# Patient Record
Sex: MALE | Race: WHITE | NOT HISPANIC OR LATINO | Employment: OTHER | URBAN - METROPOLITAN AREA
[De-identification: names, ages, dates, MRNs, and addresses within clinical notes are randomized per-mention and may not be internally consistent; named-entity substitution may affect disease eponyms.]

---

## 2017-12-18 ENCOUNTER — ANESTHESIA (OUTPATIENT)
Dept: SURGERY | Facility: HOSPITAL | Age: 77
DRG: 329 | End: 2017-12-18

## 2017-12-18 ENCOUNTER — HOSPITAL ENCOUNTER (INPATIENT)
Facility: HOSPITAL | Age: 77
LOS: 6 days | Discharge: HOME | DRG: 329 | End: 2017-12-24
Attending: EMERGENCY MEDICINE | Admitting: INTERNAL MEDICINE

## 2017-12-18 ENCOUNTER — SURGERY (OUTPATIENT)
Age: 77
End: 2017-12-18

## 2017-12-18 ENCOUNTER — ANESTHESIA EVENT (OUTPATIENT)
Dept: SURGERY | Facility: HOSPITAL | Age: 77
DRG: 329 | End: 2017-12-18

## 2017-12-18 DIAGNOSIS — K76.9 LIVER LESION: ICD-10-CM

## 2017-12-18 DIAGNOSIS — Z93.3 S/P COLOSTOMY: ICD-10-CM

## 2017-12-18 DIAGNOSIS — K56.609 BOWEL OBSTRUCTION: ICD-10-CM

## 2017-12-18 DIAGNOSIS — E87.6 HYPOKALEMIA: ICD-10-CM

## 2017-12-18 DIAGNOSIS — K56.600 PARTIAL SMALL BOWEL OBSTRUCTION: ICD-10-CM

## 2017-12-18 DIAGNOSIS — R73.9 HYPERGLYCEMIA, UNSPECIFIED: ICD-10-CM

## 2017-12-18 DIAGNOSIS — I48.91 ATRIAL FIBRILLATION WITH RAPID VENTRICULAR RESPONSE: ICD-10-CM

## 2017-12-18 DIAGNOSIS — R11.0 NAUSEA: ICD-10-CM

## 2017-12-18 DIAGNOSIS — K55.9 SMALL BOWEL ISCHEMIA: ICD-10-CM

## 2017-12-18 DIAGNOSIS — E87.1 HYPONATREMIA: ICD-10-CM

## 2017-12-18 DIAGNOSIS — R10.9 ABDOMINAL PAIN, UNSPECIFIED ABDOMINAL LOCATION: Primary | ICD-10-CM

## 2017-12-18 DIAGNOSIS — R16.0 LIVER MASSES: ICD-10-CM

## 2017-12-18 DIAGNOSIS — R57.9 SHOCK: ICD-10-CM

## 2017-12-18 DIAGNOSIS — C18.6 MALIGNANT NEOPLASM OF DESCENDING COLON: ICD-10-CM

## 2017-12-18 DIAGNOSIS — J96.01 ACUTE HYPOXEMIC RESPIRATORY FAILURE: ICD-10-CM

## 2017-12-18 DIAGNOSIS — K56.609 INTESTINAL OBSTRUCTION, UNSPECIFIED CAUSE, UNSPECIFIED WHETHER PARTIAL OR COMPLETE: ICD-10-CM

## 2017-12-18 DIAGNOSIS — I48.91 ATRIAL FIBRILLATION: ICD-10-CM

## 2017-12-18 LAB
ALBUMIN SERPL BCP-MCNC: 3.1 G/DL
ALP SERPL-CCNC: 80 U/L
ALT SERPL W/O P-5'-P-CCNC: 18 U/L
ANION GAP SERPL CALC-SCNC: 13 MMOL/L
APTT BLDCRRT: 28 SEC
AST SERPL-CCNC: 28 U/L
BACTERIA #/AREA URNS HPF: ABNORMAL /HPF
BASOPHILS # BLD AUTO: 0.01 K/UL
BASOPHILS NFR BLD: 0.2 %
BILIRUB SERPL-MCNC: 1 MG/DL
BILIRUB UR QL STRIP: ABNORMAL
BUN SERPL-MCNC: 26 MG/DL
CALCIUM SERPL-MCNC: 9.3 MG/DL
CHLORIDE SERPL-SCNC: 89 MMOL/L
CLARITY UR: CLEAR
CO2 SERPL-SCNC: 23 MMOL/L
COLOR UR: YELLOW
CREAT SERPL-MCNC: 0.8 MG/DL
DIFFERENTIAL METHOD: ABNORMAL
EOSINOPHIL # BLD AUTO: 0 K/UL
EOSINOPHIL NFR BLD: 0.2 %
ERYTHROCYTE [DISTWIDTH] IN BLOOD BY AUTOMATED COUNT: 13 %
EST. GFR  (AFRICAN AMERICAN): >60 ML/MIN/1.73 M^2
EST. GFR  (NON AFRICAN AMERICAN): >60 ML/MIN/1.73 M^2
GLUCOSE SERPL-MCNC: 184 MG/DL
GLUCOSE UR QL STRIP: NEGATIVE
HCT VFR BLD AUTO: 42.2 %
HGB BLD-MCNC: 14.8 G/DL
HGB UR QL STRIP: NEGATIVE
HYALINE CASTS #/AREA URNS LPF: 0 /LPF
INR PPP: 1.2
KETONES UR QL STRIP: ABNORMAL
LEUKOCYTE ESTERASE UR QL STRIP: NEGATIVE
LYMPHOCYTES # BLD AUTO: 0.8 K/UL
LYMPHOCYTES NFR BLD: 14.3 %
MAGNESIUM SERPL-MCNC: 1.8 MG/DL
MCH RBC QN AUTO: 29.2 PG
MCHC RBC AUTO-ENTMCNC: 35.1 G/DL
MCV RBC AUTO: 83 FL
MICROSCOPIC COMMENT: ABNORMAL
MONOCYTES # BLD AUTO: 0.9 K/UL
MONOCYTES NFR BLD: 16.7 %
NEUTROPHILS # BLD AUTO: 3.7 K/UL
NEUTROPHILS NFR BLD: 68.6 %
NITRITE UR QL STRIP: NEGATIVE
PH UR STRIP: 6 [PH] (ref 5–8)
PLATELET # BLD AUTO: 334 K/UL
PMV BLD AUTO: 9.7 FL
POTASSIUM SERPL-SCNC: 4 MMOL/L
PROT SERPL-MCNC: 6.7 G/DL
PROT UR QL STRIP: ABNORMAL
PROTHROMBIN TIME: 12.4 SEC
RBC # BLD AUTO: 5.06 M/UL
RBC #/AREA URNS HPF: 2 /HPF (ref 0–4)
SODIUM SERPL-SCNC: 125 MMOL/L
SP GR UR STRIP: 1.02 (ref 1–1.03)
TROPONIN I SERPL DL<=0.01 NG/ML-MCNC: 0.01 NG/ML
URN SPEC COLLECT METH UR: ABNORMAL
UROBILINOGEN UR STRIP-ACNC: ABNORMAL EU/DL
WBC # BLD AUTO: 5.38 K/UL
WBC #/AREA URNS HPF: 5 /HPF (ref 0–5)

## 2017-12-18 PROCEDURE — 36000708 HC OR TIME LEV III 1ST 15 MIN: Performed by: SURGERY

## 2017-12-18 PROCEDURE — 25000003 PHARM REV CODE 250: Performed by: NURSE PRACTITIONER

## 2017-12-18 PROCEDURE — 0DBM0ZZ EXCISION OF DESCENDING COLON, OPEN APPROACH: ICD-10-PCS | Performed by: SURGERY

## 2017-12-18 PROCEDURE — 25500020 PHARM REV CODE 255: Performed by: EMERGENCY MEDICINE

## 2017-12-18 PROCEDURE — 63600175 PHARM REV CODE 636 W HCPCS: Performed by: EMERGENCY MEDICINE

## 2017-12-18 PROCEDURE — 63600175 PHARM REV CODE 636 W HCPCS: Performed by: SURGERY

## 2017-12-18 PROCEDURE — 20000000 HC ICU ROOM

## 2017-12-18 PROCEDURE — 27201423 OPTIME MED/SURG SUP & DEVICES STERILE SUPPLY: Performed by: SURGERY

## 2017-12-18 PROCEDURE — 47100 WEDGE BIOPSY OF LIVER: CPT | Mod: 80,51,, | Performed by: SURGERY

## 2017-12-18 PROCEDURE — 0FB20ZX EXCISION OF LEFT LOBE LIVER, OPEN APPROACH, DIAGNOSTIC: ICD-10-PCS | Performed by: SURGERY

## 2017-12-18 PROCEDURE — 37000009 HC ANESTHESIA EA ADD 15 MINS: Performed by: SURGERY

## 2017-12-18 PROCEDURE — 36000709 HC OR TIME LEV III EA ADD 15 MIN: Performed by: SURGERY

## 2017-12-18 PROCEDURE — 81000 URINALYSIS NONAUTO W/SCOPE: CPT

## 2017-12-18 PROCEDURE — 93010 ELECTROCARDIOGRAM REPORT: CPT | Mod: 76,,, | Performed by: INTERNAL MEDICINE

## 2017-12-18 PROCEDURE — 99285 EMERGENCY DEPT VISIT HI MDM: CPT | Mod: 25

## 2017-12-18 PROCEDURE — 25000003 PHARM REV CODE 250: Performed by: SURGERY

## 2017-12-18 PROCEDURE — 88307 TISSUE EXAM BY PATHOLOGIST: CPT | Mod: 26,,, | Performed by: PATHOLOGY

## 2017-12-18 PROCEDURE — 93010 ELECTROCARDIOGRAM REPORT: CPT | Mod: ,,, | Performed by: INTERNAL MEDICINE

## 2017-12-18 PROCEDURE — 83735 ASSAY OF MAGNESIUM: CPT

## 2017-12-18 PROCEDURE — 85025 COMPLETE CBC W/AUTO DIFF WBC: CPT

## 2017-12-18 PROCEDURE — 85730 THROMBOPLASTIN TIME PARTIAL: CPT

## 2017-12-18 PROCEDURE — 85610 PROTHROMBIN TIME: CPT

## 2017-12-18 PROCEDURE — 99223 1ST HOSP IP/OBS HIGH 75: CPT | Mod: 57,,, | Performed by: SURGERY

## 2017-12-18 PROCEDURE — 44141 PARTIAL REMOVAL OF COLON: CPT | Mod: 80,,, | Performed by: SURGERY

## 2017-12-18 PROCEDURE — 25000003 PHARM REV CODE 250: Performed by: INTERNAL MEDICINE

## 2017-12-18 PROCEDURE — 25000003 PHARM REV CODE 250: Performed by: PHYSICIAN ASSISTANT

## 2017-12-18 PROCEDURE — 96366 THER/PROPH/DIAG IV INF ADDON: CPT

## 2017-12-18 PROCEDURE — 47100 WEDGE BIOPSY OF LIVER: CPT | Mod: 51,,, | Performed by: SURGERY

## 2017-12-18 PROCEDURE — 94002 VENT MGMT INPAT INIT DAY: CPT

## 2017-12-18 PROCEDURE — 88307 TISSUE EXAM BY PATHOLOGIST: CPT | Performed by: PATHOLOGY

## 2017-12-18 PROCEDURE — 84484 ASSAY OF TROPONIN QUANT: CPT

## 2017-12-18 PROCEDURE — 96365 THER/PROPH/DIAG IV INF INIT: CPT

## 2017-12-18 PROCEDURE — 96375 TX/PRO/DX INJ NEW DRUG ADDON: CPT

## 2017-12-18 PROCEDURE — 88309 TISSUE EXAM BY PATHOLOGIST: CPT | Mod: 26,,, | Performed by: PATHOLOGY

## 2017-12-18 PROCEDURE — 0D1M0Z4 BYPASS DESCENDING COLON TO CUTANEOUS, OPEN APPROACH: ICD-10-PCS | Performed by: SURGERY

## 2017-12-18 PROCEDURE — 80053 COMPREHEN METABOLIC PANEL: CPT

## 2017-12-18 PROCEDURE — 37000008 HC ANESTHESIA 1ST 15 MINUTES: Performed by: SURGERY

## 2017-12-18 PROCEDURE — C9290 INJ, BUPIVACAINE LIPOSOME: HCPCS | Performed by: SURGERY

## 2017-12-18 PROCEDURE — 44141 PARTIAL REMOVAL OF COLON: CPT | Mod: ,,, | Performed by: SURGERY

## 2017-12-18 RX ORDER — MULTIVIT WITH IRON,MINERALS
100 TABLET ORAL NIGHTLY
COMMUNITY

## 2017-12-18 RX ORDER — PANTOPRAZOLE SODIUM 40 MG/10ML
40 INJECTION, POWDER, LYOPHILIZED, FOR SOLUTION INTRAVENOUS DAILY
Status: DISCONTINUED | OUTPATIENT
Start: 2017-12-19 | End: 2017-12-19

## 2017-12-18 RX ORDER — CHLORHEXIDINE GLUCONATE ORAL RINSE 1.2 MG/ML
10 SOLUTION DENTAL 2 TIMES DAILY
Status: DISCONTINUED | OUTPATIENT
Start: 2017-12-18 | End: 2017-12-19

## 2017-12-18 RX ORDER — METRONIDAZOLE 500 MG/100ML
INJECTION, SOLUTION INTRAVENOUS
Status: DISCONTINUED
Start: 2017-12-18 | End: 2017-12-19 | Stop reason: WASHOUT

## 2017-12-18 RX ORDER — DILTIAZEM HCL 1 MG/ML
5 INJECTION, SOLUTION INTRAVENOUS CONTINUOUS
Status: DISCONTINUED | OUTPATIENT
Start: 2017-12-19 | End: 2017-12-19

## 2017-12-18 RX ORDER — CLONIDINE HYDROCHLORIDE 0.1 MG/1
0.1 TABLET ORAL EVERY 6 HOURS PRN
Status: DISCONTINUED | OUTPATIENT
Start: 2017-12-19 | End: 2017-12-19

## 2017-12-18 RX ORDER — ACETAMINOPHEN 325 MG/1
650 TABLET ORAL EVERY 6 HOURS PRN
Status: DISCONTINUED | OUTPATIENT
Start: 2017-12-18 | End: 2017-12-19

## 2017-12-18 RX ORDER — DEXMEDETOMIDINE HYDROCHLORIDE 4 UG/ML
0.2 INJECTION, SOLUTION INTRAVENOUS CONTINUOUS
Status: DISCONTINUED | OUTPATIENT
Start: 2017-12-19 | End: 2017-12-18

## 2017-12-18 RX ORDER — ONDANSETRON 2 MG/ML
4 INJECTION INTRAMUSCULAR; INTRAVENOUS EVERY 8 HOURS PRN
Status: DISCONTINUED | OUTPATIENT
Start: 2017-12-18 | End: 2017-12-24 | Stop reason: HOSPADM

## 2017-12-18 RX ORDER — AMOXICILLIN 250 MG
1 CAPSULE ORAL 2 TIMES DAILY
Status: DISCONTINUED | OUTPATIENT
Start: 2017-12-18 | End: 2017-12-19

## 2017-12-18 RX ORDER — HYDROMORPHONE HYDROCHLORIDE 1 MG/ML
0.5 INJECTION, SOLUTION INTRAMUSCULAR; INTRAVENOUS; SUBCUTANEOUS EVERY 6 HOURS PRN
Status: DISCONTINUED | OUTPATIENT
Start: 2017-12-18 | End: 2017-12-18

## 2017-12-18 RX ORDER — DEXMEDETOMIDINE HYDROCHLORIDE 4 UG/ML
0.4 INJECTION, SOLUTION INTRAVENOUS CONTINUOUS
Status: DISCONTINUED | OUTPATIENT
Start: 2017-12-18 | End: 2017-12-19

## 2017-12-18 RX ORDER — METOPROLOL TARTRATE 1 MG/ML
5 INJECTION, SOLUTION INTRAVENOUS EVERY 10 MIN PRN
Status: DISCONTINUED | OUTPATIENT
Start: 2017-12-18 | End: 2017-12-19

## 2017-12-18 RX ORDER — CEFTRIAXONE 2 G/50ML
INJECTION, SOLUTION INTRAVENOUS
Status: DISPENSED
Start: 2017-12-18 | End: 2017-12-19

## 2017-12-18 RX ORDER — ASPIRIN 81 MG/1
81 TABLET ORAL DAILY
COMMUNITY

## 2017-12-18 RX ORDER — FOLIC ACID 1 MG/1
1 TABLET ORAL DAILY
COMMUNITY

## 2017-12-18 RX ORDER — DILTIAZEM HYDROCHLORIDE 5 MG/ML
20 INJECTION INTRAVENOUS ONCE
Status: COMPLETED | OUTPATIENT
Start: 2017-12-18 | End: 2017-12-18

## 2017-12-18 RX ORDER — ENOXAPARIN SODIUM 100 MG/ML
40 INJECTION SUBCUTANEOUS
Status: DISCONTINUED | OUTPATIENT
Start: 2017-12-19 | End: 2017-12-24 | Stop reason: HOSPADM

## 2017-12-18 RX ORDER — HYDROCODONE BITARTRATE AND ACETAMINOPHEN 5; 325 MG/1; MG/1
1 TABLET ORAL EVERY 4 HOURS PRN
Status: DISCONTINUED | OUTPATIENT
Start: 2017-12-19 | End: 2017-12-19

## 2017-12-18 RX ORDER — BUPIVACAINE HYDROCHLORIDE 2.5 MG/ML
INJECTION, SOLUTION EPIDURAL; INFILTRATION; INTRACAUDAL
Status: DISCONTINUED | OUTPATIENT
Start: 2017-12-18 | End: 2017-12-18 | Stop reason: HOSPADM

## 2017-12-18 RX ORDER — BISACODYL 10 MG
10 SUPPOSITORY, RECTAL RECTAL DAILY PRN
Status: DISCONTINUED | OUTPATIENT
Start: 2017-12-19 | End: 2017-12-20

## 2017-12-18 RX ORDER — ONDANSETRON 8 MG/1
8 TABLET, ORALLY DISINTEGRATING ORAL EVERY 8 HOURS PRN
Status: DISCONTINUED | OUTPATIENT
Start: 2017-12-19 | End: 2017-12-19

## 2017-12-18 RX ORDER — CHLORHEXIDINE GLUCONATE ORAL RINSE 1.2 MG/ML
15 SOLUTION DENTAL 2 TIMES DAILY
Status: DISCONTINUED | OUTPATIENT
Start: 2017-12-19 | End: 2017-12-19

## 2017-12-18 RX ORDER — SODIUM CHLORIDE, SODIUM LACTATE, POTASSIUM CHLORIDE, CALCIUM CHLORIDE 600; 310; 30; 20 MG/100ML; MG/100ML; MG/100ML; MG/100ML
INJECTION, SOLUTION INTRAVENOUS CONTINUOUS
Status: DISCONTINUED | OUTPATIENT
Start: 2017-12-19 | End: 2017-12-19

## 2017-12-18 RX ORDER — SODIUM CHLORIDE 9 MG/ML
INJECTION, SOLUTION INTRAVENOUS CONTINUOUS
Status: DISCONTINUED | OUTPATIENT
Start: 2017-12-18 | End: 2017-12-18

## 2017-12-18 RX ORDER — LACTULOSE 10 G/15ML
20 SOLUTION ORAL EVERY 6 HOURS PRN
Status: DISCONTINUED | OUTPATIENT
Start: 2017-12-19 | End: 2017-12-19

## 2017-12-18 RX ORDER — OXYCODONE HYDROCHLORIDE 5 MG/1
5 TABLET ORAL EVERY 4 HOURS PRN
Status: CANCELLED | OUTPATIENT
Start: 2017-12-18

## 2017-12-18 RX ORDER — DEXTROSE MONOHYDRATE AND SODIUM CHLORIDE 5; .9 G/100ML; G/100ML
INJECTION, SOLUTION INTRAVENOUS CONTINUOUS
Status: CANCELLED | OUTPATIENT
Start: 2017-12-18

## 2017-12-18 RX ORDER — CIPROFLOXACIN 2 MG/ML
400 INJECTION, SOLUTION INTRAVENOUS
Status: COMPLETED | OUTPATIENT
Start: 2017-12-18 | End: 2017-12-23

## 2017-12-18 RX ORDER — PROMETHAZINE HYDROCHLORIDE 25 MG/1
25 SUPPOSITORY RECTAL EVERY 6 HOURS PRN
Status: DISCONTINUED | OUTPATIENT
Start: 2017-12-19 | End: 2017-12-24 | Stop reason: HOSPADM

## 2017-12-18 RX ORDER — DIPHENHYDRAMINE HYDROCHLORIDE 50 MG/ML
25 INJECTION INTRAMUSCULAR; INTRAVENOUS EVERY 4 HOURS PRN
Status: DISCONTINUED | OUTPATIENT
Start: 2017-12-19 | End: 2017-12-24 | Stop reason: HOSPADM

## 2017-12-18 RX ORDER — METRONIDAZOLE 500 MG/100ML
500 INJECTION, SOLUTION INTRAVENOUS
Status: DISPENSED | OUTPATIENT
Start: 2017-12-19 | End: 2017-12-23

## 2017-12-18 RX ORDER — FAMOTIDINE 10 MG/ML
20 INJECTION INTRAVENOUS 2 TIMES DAILY
Status: DISCONTINUED | OUTPATIENT
Start: 2017-12-19 | End: 2017-12-20

## 2017-12-18 RX ORDER — MORPHINE SULFATE 4 MG/ML
2 INJECTION, SOLUTION INTRAMUSCULAR; INTRAVENOUS
Status: DISCONTINUED | OUTPATIENT
Start: 2017-12-19 | End: 2017-12-24 | Stop reason: HOSPADM

## 2017-12-18 RX ORDER — DILTIAZEM HCL/D5W 125 MG/125
10 PLASTIC BAG, INJECTION (ML) INTRAVENOUS CONTINUOUS
Status: DISCONTINUED | OUTPATIENT
Start: 2017-12-18 | End: 2017-12-18

## 2017-12-18 RX ORDER — RAMELTEON 8 MG/1
8 TABLET ORAL NIGHTLY PRN
Status: DISCONTINUED | OUTPATIENT
Start: 2017-12-19 | End: 2017-12-20

## 2017-12-18 RX ADMIN — IOHEXOL 75 ML: 350 INJECTION, SOLUTION INTRAVENOUS at 11:12

## 2017-12-18 RX ADMIN — ONDANSETRON 4 MG: 2 INJECTION INTRAMUSCULAR; INTRAVENOUS at 03:12

## 2017-12-18 RX ADMIN — SODIUM CHLORIDE, SODIUM LACTATE, POTASSIUM CHLORIDE, AND CALCIUM CHLORIDE: .6; .31; .03; .02 INJECTION, SOLUTION INTRAVENOUS at 11:12

## 2017-12-18 RX ADMIN — BUPIVACAINE HYDROCHLORIDE 30 ML: 2.5 INJECTION, SOLUTION EPIDURAL; INFILTRATION; INTRACAUDAL; PERINEURAL at 11:12

## 2017-12-18 RX ADMIN — MORPHINE SULFATE 2 MG: 4 INJECTION, SOLUTION INTRAMUSCULAR; INTRAVENOUS at 11:12

## 2017-12-18 RX ADMIN — METOROPROLOL TARTRATE 5 MG: 5 INJECTION, SOLUTION INTRAVENOUS at 07:12

## 2017-12-18 RX ADMIN — Medication 10 MG/HR: at 08:12

## 2017-12-18 RX ADMIN — DILTIAZEM HYDROCHLORIDE 20 MG: 5 INJECTION INTRAVENOUS at 08:12

## 2017-12-18 RX ADMIN — BUPIVACAINE 20 ML: 13.3 INJECTION, SUSPENSION, LIPOSOMAL INFILTRATION at 11:12

## 2017-12-18 RX ADMIN — DIATRIZOATE MEGLUMINE AND DIATRIZOATE SODIUM 960 ML: 660; 100 LIQUID ORAL; RECTAL at 05:12

## 2017-12-18 RX ADMIN — DEXMEDETOMIDINE HYDROCHLORIDE 0.4 MCG/KG/HR: 4 INJECTION, SOLUTION INTRAVENOUS at 11:12

## 2017-12-18 RX ADMIN — SODIUM CHLORIDE: 0.9 INJECTION, SOLUTION INTRAVENOUS at 08:12

## 2017-12-18 NOTE — ASSESSMENT & PLAN NOTE
-Possibly represent metastasis associated with obstructing mass.   -Further work up will be planned as outpatient, if needed.

## 2017-12-18 NOTE — ED NOTES
Pt had a sudden onset of tachycardia, appeared to be A-Fib, rate of 158 - 174, but returned to 101 after several minutes. Pt denies chest pain, only abd pain. Pt also c/o belching.

## 2017-12-18 NOTE — ED NOTES
Pt resting in bed. NAD, VSS, RR equal and unlabored. Will continue to monitor. Wife and family friends at bedside. Updated on inpatient bed status.

## 2017-12-18 NOTE — HPI
Herb Sumner is a 77 year old male who presented to [facility] with reports of a 2 weeks history of worsening abdominal distention, belching, nausea and vomiting. The patient reports that his abdominal distention was initially mild and progressed over the last two weeks. He had some intermittent relief with vegetable laxatives, but symptoms returned. Today, the patient came to the ED because he could not tolerate even water without vomiting. There is associated frequent belching and hiccups. He reports that his intake has gradually decreased since the onset of symptoms. His last bowel movement was 5 days ago and he last passed gas one day ago. In the ED CT scan of the abdomen showed multiple liver lesions as well as distended small bowel and colon. The patient was also found to have hyponatremia with a sodium of 125. Code status was discussed with the patient who is a full code. The patient is unsure of if or when he last had a colonoscopy. His daughter, Kimberly Sumner is his surrogate medical decision maker.

## 2017-12-18 NOTE — ASSESSMENT & PLAN NOTE
-Concerning for possible obstructing mass.   -Discussed plan of care with Dr. Ballard.   -Keep patient NPO for possible surgery.   -NG tube placement due to persistent vomiting.

## 2017-12-18 NOTE — H&P
[facility] Hospital Medicine  History & Physical    Patient Name: Herb Sumner  MRN: [id number]  Admission Date: 12/18/2017  Attending Physician: Calin Leo Jr., MD   Primary Care Provider: Primary Doctor No         Patient information was obtained from patient, caregiver / friend, past medical records and ER records.     Subjective:     Principal Problem:Bowel obstruction    Chief Complaint:   Chief Complaint   Patient presents with    Abdominal Pain     reports he has not been eating or drinking anything, also c/o vomiting every time he eats and abdominal swelling        HPI: Herb Sumner is a 77 year old male who presented to [facility] with reports of a 2 weeks history of worsening abdominal distention, belching, nausea and vomiting. The patient reports that his abdominal distention was initially mild and progressed over the last two weeks. He had some intermittent relief with vegetable laxatives, but symptoms returned. Today, the patient came to the ED because he could not tolerate even water without vomiting. There is associated frequent belching and hiccups. He reports that his intake has gradually decreased since the onset of symptoms. His last bowel movement was 5 days ago and he last passed gas one day ago. In the ED CT scan of the abdomen showed multiple liver lesions as well as distended small bowel and colon. The patient was also found to have hyponatremia with a sodium of 125. Code status was discussed with the patient who is a full code. The patient is unsure of if or when he last had a colonoscopy. His daughter, Kimberly Sumner is his surrogate medical decision maker.     Past Medical History:   Diagnosis Date    Hyperlipidemia        Past Surgical History:   Procedure Laterality Date    BACK SURGERY         Review of patient's allergies indicates:  No Known Allergies    Outpatient Medications:   ASA 81 mg daily    Family History     Problem Relation (Age of Onset)     Stroke Mother        Social History Main Topics    Smoking status: Former Smoker    Smokeless tobacco: Not on file    Alcohol use 4.2 oz/week     7 Glasses of wine per week    Drug use: No    Sexual activity: Not on file     Review of Systems   Constitutional: Positive for appetite change (decrease). Negative for chills, diaphoresis, fatigue and fever.   HENT: Negative for congestion, ear pain, mouth sores, sore throat and trouble swallowing.    Eyes: Negative for pain and visual disturbance.   Respiratory: Negative for cough, chest tightness and shortness of breath.    Cardiovascular: Negative for chest pain, palpitations and leg swelling.   Gastrointestinal: Positive for abdominal distention, nausea and vomiting. Negative for constipation and diarrhea.        Positive for belching and hiccups   Endocrine: Negative for cold intolerance, heat intolerance, polydipsia and polyuria.   Genitourinary: Negative for dysuria, frequency and hematuria.   Musculoskeletal: Negative for arthralgias, back pain, myalgias and neck pain.   Skin: Negative for pallor, rash and wound.   Allergic/Immunologic: Negative for environmental allergies and immunocompromised state.   Neurological: Negative for dizziness, seizures, syncope, weakness, numbness and headaches.   Hematological: Negative for adenopathy. Does not bruise/bleed easily.   Psychiatric/Behavioral: Negative for agitation, confusion and sleep disturbance.     Objective:     Vital Signs (Most Recent):  Temp: 99.1 °F (37.3 °C) (12/18/17 0936)  Pulse: 97 (12/18/17 1502)  Resp: (!) 28 (12/18/17 1502)  BP: (!) 148/78 (12/18/17 1502)  SpO2: 95 % (12/18/17 1502) Vital Signs (24h Range):  Temp:  [98.2 °F (36.8 °C)-99.1 °F (37.3 °C)] 99.1 °F (37.3 °C)  Pulse:  [] 97  Resp:  [18-31] 28  SpO2:  [94 %-97 %] 95 %  BP: (129-189)/() 148/78     Weight: 74.4 kg (164 lb 0.4 oz)  Body mass index is 27.29 kg/m².    Physical Exam   Constitutional: He is oriented to person,  place, and time. He appears well-developed and well-nourished.   HENT:   Head: Normocephalic and atraumatic.   Eyes: Conjunctivae are normal.   Neck: Neck supple. No JVD present.   Cardiovascular: Normal rate, regular rhythm and normal heart sounds.    Pulmonary/Chest: Effort normal and breath sounds normal. He has no wheezes.   Abdominal: Soft. He exhibits distension. Bowel sounds are absent. There is generalized tenderness (diffuse).   Musculoskeletal: Normal range of motion.   Neurological: He is alert and oriented to person, place, and time.   Skin: Skin is warm and dry. No rash noted.   Psychiatric: He has a normal mood and affect. His behavior is normal. Thought content normal.   Nursing note and vitals reviewed.          Significant Labs:   CBC:   Recent Labs  Lab 12/18/17  0900   WBC 5.38   HGB 14.8   HCT 42.2        CMP:   Recent Labs  Lab 12/18/17  0900   *   K 4.0   CL 89*   CO2 23   *   BUN 26*   CREATININE 0.8   CALCIUM 9.3   PROT 6.7   ALBUMIN 3.1*   BILITOT 1.0   ALKPHOS 80   AST 28   ALT 18   ANIONGAP 13   EGFRNONAA >60     All pertinent labs within the past 24 hours have been reviewed.    Significant Imaging: I have reviewed all pertinent imaging results/findings within the past 24 hours.   Imaging Results          CT Abdomen Pelvis With Contrast (Final result)  Result time 12/18/17 12:16:57    Final result by Waqar Coppola MD (12/18/17 12:16:57)                 Impression:      Distended small bowel and colon.  Can't exclude distal small bowel obstruction.  Small amount of ascites.  Multiple liver lesions suspicious for metastatic disease.  Multiple other non-emergent findings as above.    All CT scans at this facility use dose modulation, iterative reconstruction and/or weight based dosing when appropriate to reduce radiation dose to as low as reasonably achievable.       Electronically signed by: WAQAR COPPOLA MD  Date:     12/18/17  Time:    12:16               Narrative:    Exam: CT ABDOMEN PELVIS WITH CONTRAST,    Date:  12/18/17 11:25:46    History: Abdominal pain with onset gradually one week ago, nausea and vomiting, decreased appetite, abdominal distention    Comparison:  No prior relevant studies available    Findings: There are multiple low-density lesions of the liver which do not have the appearance of simple cysts and are suspicious for metastatic disease, the largest 18 mm.  There is fluid distention of the distal esophagus.  There are small bilateral pleural effusions, right greater than left, with adjacent mild bilateral lower lobe atelectasis.  There are moderately distended fluid-filled loops of small bowel throughout the abdomen with only the terminal ileum decompressed.  There is distention of the right colon with stool.  Maximum diameter of the cecum is 11 cm.  There is moderate gaseous distention of the transverse colon.  Moderate stool in the left colon.  No definite indication of colonic obstruction.  The sigmoid and rectum are decompressed.  There is diverticulosis, severe in the sigmoid.  Small amount of low-density free fluid in the pelvis.  No evidence of free air.  The bowel pattern may be on the basis of enteritis/ileus.  Can't exclude distal small bowel obstruction.  No abnormality of the spleen, pancreas, gallbladder, adrenals, or left kidney is seen.  Small right renal cysts.                             X-Ray Chest AP Portable (Final result)  Result time 12/18/17 09:25:30    Final result by Waqar Coppola MD (12/18/17 09:25:30)                 Impression:     See above      Electronically signed by: WAQAR COPPOLA MD  Date:     12/18/17  Time:    09:25              Narrative:    History: Nausea    No prior studies.    Normal heart size.  There is mild bibasilar atelectasis.  There is blunting of the costophrenic angles consistent with small bibasilar effusions/mild pleural thickening.                                Assessment/Plan:     *  Bowel obstruction    -Concerning for possible obstructing mass.   -Discussed plan of care with Dr. Ballard.   -Keep patient NPO for possible surgery.   -NG tube placement due to persistent vomiting.          Hyponatremia    -IV NS.   -Monitor.           Liver masses    -Possibly represent metastasis associated with obstructing mass.   -Further work up will be planned as outpatient, if needed.           VTE Risk Mitigation         Ordered     Place sequential compression device  Until discontinued      12/18/17 0718             ROBE Roberts  Department of Hospital Medicine   [facility]

## 2017-12-18 NOTE — ED NOTES
Pt reports he has had abdominal pain x 2 weeks. 5 days ago pt started vomiting, unable to eat, last BM was 5 days ago.

## 2017-12-18 NOTE — SUBJECTIVE & OBJECTIVE
Past Medical History:   Diagnosis Date    Hyperlipidemia        Past Surgical History:   Procedure Laterality Date    BACK SURGERY         Review of patient's allergies indicates:  No Known Allergies    Outpatient Medications:   None    Family History     Problem Relation (Age of Onset)    Stroke Mother        Social History Main Topics    Smoking status: Former Smoker    Smokeless tobacco: Not on file    Alcohol use 4.2 oz/week     7 Glasses of wine per week    Drug use: No    Sexual activity: Not on file     Review of Systems   Constitutional: Positive for appetite change (decrease). Negative for chills, diaphoresis, fatigue and fever.   HENT: Negative for congestion, ear pain, mouth sores, sore throat and trouble swallowing.    Eyes: Negative for pain and visual disturbance.   Respiratory: Negative for cough, chest tightness and shortness of breath.    Cardiovascular: Negative for chest pain, palpitations and leg swelling.   Gastrointestinal: Positive for abdominal distention, nausea and vomiting. Negative for constipation and diarrhea.        Positive for belching and hiccups   Endocrine: Negative for cold intolerance, heat intolerance, polydipsia and polyuria.   Genitourinary: Negative for dysuria, frequency and hematuria.   Musculoskeletal: Negative for arthralgias, back pain, myalgias and neck pain.   Skin: Negative for pallor, rash and wound.   Allergic/Immunologic: Negative for environmental allergies and immunocompromised state.   Neurological: Negative for dizziness, seizures, syncope, weakness, numbness and headaches.   Hematological: Negative for adenopathy. Does not bruise/bleed easily.   Psychiatric/Behavioral: Negative for agitation, confusion and sleep disturbance.     Objective:     Vital Signs (Most Recent):  Temp: 99.1 °F (37.3 °C) (12/18/17 0936)  Pulse: 97 (12/18/17 1502)  Resp: (!) 28 (12/18/17 1502)  BP: (!) 148/78 (12/18/17 1502)  SpO2: 95 % (12/18/17 1502) Vital Signs (24h  Range):  Temp:  [98.2 °F (36.8 °C)-99.1 °F (37.3 °C)] 99.1 °F (37.3 °C)  Pulse:  [] 97  Resp:  [18-31] 28  SpO2:  [94 %-97 %] 95 %  BP: (129-189)/() 148/78     Weight: 74.4 kg (164 lb 0.4 oz)  Body mass index is 27.29 kg/m².    Physical Exam   Constitutional: He is oriented to person, place, and time. He appears well-developed and well-nourished.   HENT:   Head: Normocephalic and atraumatic.   Eyes: Conjunctivae are normal.   Neck: Neck supple. No JVD present.   Cardiovascular: Normal rate, regular rhythm and normal heart sounds.    Pulmonary/Chest: Effort normal and breath sounds normal. He has no wheezes.   Abdominal: Soft. He exhibits distension. Bowel sounds are absent. There is generalized tenderness (diffuse).   Musculoskeletal: Normal range of motion.   Neurological: He is alert and oriented to person, place, and time.   Skin: Skin is warm and dry. No rash noted.   Psychiatric: He has a normal mood and affect. His behavior is normal. Thought content normal.   Nursing note and vitals reviewed.          Significant Labs:   CBC:   Recent Labs  Lab 12/18/17  0900   WBC 5.38   HGB 14.8   HCT 42.2        CMP:   Recent Labs  Lab 12/18/17  0900   *   K 4.0   CL 89*   CO2 23   *   BUN 26*   CREATININE 0.8   CALCIUM 9.3   PROT 6.7   ALBUMIN 3.1*   BILITOT 1.0   ALKPHOS 80   AST 28   ALT 18   ANIONGAP 13   EGFRNONAA >60     All pertinent labs within the past 24 hours have been reviewed.    Significant Imaging: I have reviewed all pertinent imaging results/findings within the past 24 hours.   Imaging Results          CT Abdomen Pelvis With Contrast (Final result)  Result time 12/18/17 12:16:57    Final result by Waqar Coppola MD (12/18/17 12:16:57)                 Impression:      Distended small bowel and colon.  Can't exclude distal small bowel obstruction.  Small amount of ascites.  Multiple liver lesions suspicious for metastatic disease.  Multiple other non-emergent findings as  above.    All CT scans at this facility use dose modulation, iterative reconstruction and/or weight based dosing when appropriate to reduce radiation dose to as low as reasonably achievable.       Electronically signed by: WAQAR COPPOLA MD  Date:     12/18/17  Time:    12:16              Narrative:    Exam: CT ABDOMEN PELVIS WITH CONTRAST,    Date:  12/18/17 11:25:46    History: Abdominal pain with onset gradually one week ago, nausea and vomiting, decreased appetite, abdominal distention    Comparison:  No prior relevant studies available    Findings: There are multiple low-density lesions of the liver which do not have the appearance of simple cysts and are suspicious for metastatic disease, the largest 18 mm.  There is fluid distention of the distal esophagus.  There are small bilateral pleural effusions, right greater than left, with adjacent mild bilateral lower lobe atelectasis.  There are moderately distended fluid-filled loops of small bowel throughout the abdomen with only the terminal ileum decompressed.  There is distention of the right colon with stool.  Maximum diameter of the cecum is 11 cm.  There is moderate gaseous distention of the transverse colon.  Moderate stool in the left colon.  No definite indication of colonic obstruction.  The sigmoid and rectum are decompressed.  There is diverticulosis, severe in the sigmoid.  Small amount of low-density free fluid in the pelvis.  No evidence of free air.  The bowel pattern may be on the basis of enteritis/ileus.  Can't exclude distal small bowel obstruction.  No abnormality of the spleen, pancreas, gallbladder, adrenals, or left kidney is seen.  Small right renal cysts.                             X-Ray Chest AP Portable (Final result)  Result time 12/18/17 09:25:30    Final result by Waqar Coppola MD (12/18/17 09:25:30)                 Impression:     See above      Electronically signed by: WAQAR COPPOLA MD  Date:      12/18/17  Time:    09:25              Narrative:    History: Nausea    No prior studies.    Normal heart size.  There is mild bibasilar atelectasis.  There is blunting of the costophrenic angles consistent with small bibasilar effusions/mild pleural thickening.

## 2017-12-18 NOTE — ED PROVIDER NOTES
SCRIBE #1 NOTE: I, Jennifer Blanchard, am scribing for, and in the presence of, Calin Leo Jr., MD. I have scribed the entire note.      History      Chief Complaint   Patient presents with    Abdominal Pain     reports he has not been eating or drinking anything, also c/o vomiting every time he eats and abdominal swelling       Review of patient's allergies indicates:  No Known Allergies     HPI   HPI    12/18/2017, 9:11 AM   History obtained from the patient      History of Present Illness: Herb Sumner is a 77 y.o. male patient who presents to the Emergency Department for abd pain which onset gradually 1 week ago. Symptoms are constant and moderate in severity.  No mitigating or exacerbating factors reported. Associated sxs include n/v, decreased appetite, abd distention,  and diaphresis. Patient denies any fever, chills, constipation, hematochezia, dysuria, hematuria, urinary frequency,and all other sxs at this time. Prior Tx includes gas pills. No further complaints or concerns at this time.         Arrival mode: Personal vehicle    PCP: Primary Doctor No       Past Medical History:  History reviewed. No pertinent past medical history.    Past Surgical History:  No past surgical history on file.      Family History:  History reviewed. No pertinent family history.    Social History:  Social History     Social History Main Topics    Smoking status: Not on file    Smokeless tobacco: Not on file    Alcohol use Not on file    Drug use: Unknown    Sexual activity: Not on file       ROS   Review of Systems   Constitutional: Positive for appetite change. Negative for chills and fever.   HENT: Negative for sore throat.    Respiratory: Negative for shortness of breath.    Cardiovascular: Negative for chest pain.   Gastrointestinal: Positive for abdominal distention, abdominal pain and vomiting. Negative for blood in stool, constipation, diarrhea and nausea.   Genitourinary: Negative for dysuria, frequency and  "hematuria.   Musculoskeletal: Negative for back pain.   Skin: Negative for rash.   Neurological: Negative for weakness.   Hematological: Does not bruise/bleed easily.       Physical Exam      Initial Vitals [12/18/17 0844]   BP Pulse Resp Temp SpO2   (!) 182/62 (!) 114 (!) 22 98.2 °F (36.8 °C) 95 %      MAP       102          Physical Exam  Nursing Notes and Vital Signs Reviewed.  Constitutional: Patient is in no apparent distress. Well-developed and well-nourished.  Head: Atraumatic. Normocephalic.  Eyes: PERRL. EOM intact. Conjunctivae are not pale. No scleral icterus.  ENT: Mucous membranes are moist. Oropharynx is clear and symmetric.    Neck: Supple. Full ROM. No lymphadenopathy.  Cardiovascular: Tachycardia. Regular rhythm. No murmurs, rubs, or gallops. Distal pulses are 2+ and symmetric.  Pulmonary/Chest: No respiratory distress. Clear to auscultation bilaterally. No wheezing or rales.  Abdominal: Distended.  There is no tenderness.  No rebound, guarding, or rigidity. Good bowel sounds.  Genitourinary: No CVA tenderness  Musculoskeletal: Moves all extremities. No obvious deformities. No edema. No calf tenderness.  Skin: Warm and dry. Palpable petechia diffusely.  Neurological:  Alert, awake, and appropriate.  Normal speech.  No acute focal neurological deficits are appreciated.  Psychiatric: Normal affect. Good eye contact. Appropriate in content.    ED Course    Procedures  ED Vital Signs:  Vitals:    12/18/17 0844 12/18/17 0927 12/18/17 0928 12/18/17 0932   BP: (!) 182/62  (!) 129/106 (!) 148/75   Pulse: (!) 114 (!) 161 101 102   Resp: (!) 22 (!) 24 (!) 26 (!) 31   Temp: 98.2 °F (36.8 °C)      TempSrc: Oral      SpO2: 95% 96% 97% 96%   Weight: 74.4 kg (164 lb 0.4 oz)      Height: 5' 5" (1.651 m)       12/18/17 0936 12/18/17 1026   BP:  (!) 163/82   Pulse:  102   Resp:  20   Temp: 99.1 °F (37.3 °C)    TempSrc: Oral    SpO2:  95%   Weight:     Height:         Abnormal Lab Results:  Labs Reviewed   CBC W/ AUTO " DIFFERENTIAL - Abnormal; Notable for the following:        Result Value    Lymph # 0.8 (*)     Lymph% 14.3 (*)     Mono% 16.7 (*)     All other components within normal limits   COMPREHENSIVE METABOLIC PANEL - Abnormal; Notable for the following:     Sodium 125 (*)     Chloride 89 (*)     Glucose 184 (*)     BUN, Bld 26 (*)     Albumin 3.1 (*)     All other components within normal limits   URINALYSIS - Abnormal; Notable for the following:     Protein, UA 1+ (*)     Ketones, UA 1+ (*)     Bilirubin (UA) 1+ (*)     Urobilinogen, UA 2.0-3.0 (*)     All other components within normal limits   URINALYSIS MICROSCOPIC - Abnormal; Notable for the following:     Bacteria, UA Few (*)     All other components within normal limits   PROTIME-INR   APTT   TROPONIN I        All Lab Results:  Results for orders placed or performed during the hospital encounter of 12/18/17   CBC auto differential   Result Value Ref Range    WBC 5.38 3.90 - 12.70 K/uL    RBC 5.06 4.60 - 6.20 M/uL    Hemoglobin 14.8 14.0 - 18.0 g/dL    Hematocrit 42.2 40.0 - 54.0 %    MCV 83 82 - 98 fL    MCH 29.2 27.0 - 31.0 pg    MCHC 35.1 32.0 - 36.0 g/dL    RDW 13.0 11.5 - 14.5 %    Platelets 334 150 - 350 K/uL    MPV 9.7 9.2 - 12.9 fL    Gran # 3.7 1.8 - 7.7 K/uL    Lymph # 0.8 (L) 1.0 - 4.8 K/uL    Mono # 0.9 0.3 - 1.0 K/uL    Eos # 0.0 0.0 - 0.5 K/uL    Baso # 0.01 0.00 - 0.20 K/uL    Gran% 68.6 38.0 - 73.0 %    Lymph% 14.3 (L) 18.0 - 48.0 %    Mono% 16.7 (H) 4.0 - 15.0 %    Eosinophil% 0.2 0.0 - 8.0 %    Basophil% 0.2 0.0 - 1.9 %    Differential Method Automated    Comprehensive metabolic panel   Result Value Ref Range    Sodium 125 (L) 136 - 145 mmol/L    Potassium 4.0 3.5 - 5.1 mmol/L    Chloride 89 (L) 95 - 110 mmol/L    CO2 23 23 - 29 mmol/L    Glucose 184 (H) 70 - 110 mg/dL    BUN, Bld 26 (H) 8 - 23 mg/dL    Creatinine 0.8 0.5 - 1.4 mg/dL    Calcium 9.3 8.7 - 10.5 mg/dL    Total Protein 6.7 6.0 - 8.4 g/dL    Albumin 3.1 (L) 3.5 - 5.2 g/dL    Total  Bilirubin 1.0 0.1 - 1.0 mg/dL    Alkaline Phosphatase 80 55 - 135 U/L    AST 28 10 - 40 U/L    ALT 18 10 - 44 U/L    Anion Gap 13 8 - 16 mmol/L    eGFR if African American >60 >60 mL/min/1.73 m^2    eGFR if non African American >60 >60 mL/min/1.73 m^2   Protime-INR   Result Value Ref Range    Prothrombin Time 12.4 9.0 - 12.5 sec    INR 1.2 0.8 - 1.2   APTT   Result Value Ref Range    aPTT 28.0 21.0 - 32.0 sec   Urinalysis   Result Value Ref Range    Specimen UA Urine, Clean Catch     Color, UA Yellow Yellow, Straw, Elizabeth    Appearance, UA Clear Clear    pH, UA 6.0 5.0 - 8.0    Specific Gravity, UA 1.025 1.005 - 1.030    Protein, UA 1+ (A) Negative    Glucose, UA Negative Negative    Ketones, UA 1+ (A) Negative    Bilirubin (UA) 1+ (A) Negative    Occult Blood UA Negative Negative    Nitrite, UA Negative Negative    Urobilinogen, UA 2.0-3.0 (A) <2.0 EU/dL    Leukocytes, UA Negative Negative   Troponin I   Result Value Ref Range    Troponin I 0.012 0.000 - 0.026 ng/mL   Urinalysis Microscopic   Result Value Ref Range    RBC, UA 2 0 - 4 /hpf    WBC, UA 5 0 - 5 /hpf    Bacteria, UA Few (A) None-Occ /hpf    Hyaline Casts, UA 0 0-1/lpf /lpf    Microscopic Comment SEE COMMENT          Imaging Results:  Imaging Results          X-Ray Chest AP Portable (Final result)  Result time 12/18/17 09:25:30    Final result by Waqar Coppola MD (12/18/17 09:25:30)                 Impression:     See above      Electronically signed by: WAQAR COPPOLA MD  Date:     12/18/17  Time:    09:25              Narrative:    History: Nausea    No prior studies.    Normal heart size.  There is mild bibasilar atelectasis.  There is blunting of the costophrenic angles consistent with small bibasilar effusions/mild pleural thickening.                             The EKG was ordered, reviewed, and independently interpreted by the ED provider.  Interpretation time: 0912  Rate: 100 BPM  Rhythm: normal sinus rhythm  Interpretation:  No STEMI.              The Emergency Provider reviewed the vital signs and test results, which are outlined above.    ED Discussion   12:23 PM: Re-evaluated pt. I have discussed test results, shared treatment plan, and the need for admission with patient and family at bedside. Pt and family express understanding at this time and agree with all information. All questions answered. Pt and family have no further questions or concerns at this time. Pt is ready for admit.    12:43 PM: Discussed case with ROGERS Senior ([facility]). Dr. Jolly MD agrees with current care and management of pt and accepts admission.   Admitting Service: Hospital medicine   Admitting Physician: MD Marco A  Admit to: Tele        ED Medication(s):  Medications - No data to display    New Prescriptions    No medications on file             Medical Decision Making    Medical Decision Making:   Clinical Tests:   Lab Tests: Ordered and Reviewed  Radiological Study: Ordered and Reviewed  Medical Tests: Ordered and Reviewed           Scribe Attestation:   Scribe #1: I performed the above scribed service and the documentation accurately describes the services I performed. I attest to the accuracy of the note.    Attending:   Physician Attestation Statement for Scribe #1: I, Calin Leo Jr., MD, personally performed the services described in this documentation, as scribed by Jennifer Blanchard, in my presence, and it is both accurate and complete.          Clinical Impression       ICD-10-CM ICD-9-CM   1. Abdominal pain, unspecified abdominal location R10.9 789.00   2. Nausea R11.0 787.02   3. Liver lesion K76.9 573.8   4. Partial small bowel obstruction K56.600 560.9   5. Atrial fibrillation I48.91 427.31   6. 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 Bowel obstruction K56.609 560.9   8. Hyponatremia E87.1 276.1   9. Liver masses R16.0 573.9   10. Intestinal obstruction, unspecified cause, unspecified whether partial or complete K56.609 560.9   11.  Acute hypoxemic respiratory failure J96.01 518.81   12. Atrial fibrillation with rapid ventricular response I48.91 427.31   13. Hyperglycemia, unspecified R73.9 790.29   14. Hypokalemia E87.6 276.8   15. S/P colostomy Z93.3 V44.3   16. Shock R57.9 785.50   17. Small bowel ischemia K55.9 557.9       Disposition:   Disposition: Admitted  Condition: Red Leo Jr., MD  12/19/17 1325

## 2017-12-19 PROBLEM — I48.91 ATRIAL FIBRILLATION WITH RAPID VENTRICULAR RESPONSE: Status: ACTIVE | Noted: 2017-12-19

## 2017-12-19 PROBLEM — R73.9 HYPERGLYCEMIA, UNSPECIFIED: Status: ACTIVE | Noted: 2017-12-19

## 2017-12-19 PROBLEM — R57.0 SHOCK, CARDIOGENIC: Status: ACTIVE | Noted: 2017-12-19

## 2017-12-19 PROBLEM — Z90.49 S/P SMALL BOWEL RESECTION: Status: ACTIVE | Noted: 2017-12-19

## 2017-12-19 PROBLEM — J96.01 ACUTE HYPOXEMIC RESPIRATORY FAILURE: Status: ACTIVE | Noted: 2017-12-19

## 2017-12-19 PROBLEM — E87.6 HYPOKALEMIA: Status: ACTIVE | Noted: 2017-12-18

## 2017-12-19 PROBLEM — R57.9 SHOCK: Status: ACTIVE | Noted: 2017-12-19

## 2017-12-19 PROBLEM — K55.9 SMALL BOWEL ISCHEMIA: Status: ACTIVE | Noted: 2017-12-19

## 2017-12-19 PROBLEM — Z93.3 S/P COLOSTOMY: Status: ACTIVE | Noted: 2017-12-19

## 2017-12-19 LAB
ALBUMIN SERPL BCP-MCNC: 1.9 G/DL
ALLENS TEST: ABNORMAL
ANION GAP SERPL CALC-SCNC: 11 MMOL/L
ANION GAP SERPL CALC-SCNC: 11 MMOL/L
ANION GAP SERPL CALC-SCNC: 8 MMOL/L
BASOPHILS # BLD AUTO: 0.01 K/UL
BASOPHILS # BLD AUTO: 0.01 K/UL
BASOPHILS NFR BLD: 0.1 %
BASOPHILS NFR BLD: 0.1 %
BUN SERPL-MCNC: 19 MG/DL
BUN SERPL-MCNC: 19 MG/DL
BUN SERPL-MCNC: 26 MG/DL
CALCIUM SERPL-MCNC: 5.7 MG/DL
CALCIUM SERPL-MCNC: 5.7 MG/DL
CALCIUM SERPL-MCNC: 8 MG/DL
CHLORIDE SERPL-SCNC: 103 MMOL/L
CHLORIDE SERPL-SCNC: 107 MMOL/L
CHLORIDE SERPL-SCNC: 107 MMOL/L
CO2 SERPL-SCNC: 16 MMOL/L
CO2 SERPL-SCNC: 16 MMOL/L
CO2 SERPL-SCNC: 22 MMOL/L
CORTIS SERPL-MCNC: 37.1 UG/DL
CREAT SERPL-MCNC: 0.7 MG/DL
CREAT SERPL-MCNC: 0.7 MG/DL
CREAT SERPL-MCNC: 0.9 MG/DL
DELSYS: ABNORMAL
DIASTOLIC DYSFUNCTION: NO
DIFFERENTIAL METHOD: ABNORMAL
DIFFERENTIAL METHOD: ABNORMAL
EOSINOPHIL # BLD AUTO: 0 K/UL
EOSINOPHIL # BLD AUTO: 0 K/UL
EOSINOPHIL NFR BLD: 0 %
EOSINOPHIL NFR BLD: 0 %
ERYTHROCYTE [DISTWIDTH] IN BLOOD BY AUTOMATED COUNT: 13.6 %
ERYTHROCYTE [DISTWIDTH] IN BLOOD BY AUTOMATED COUNT: 13.6 %
ERYTHROCYTE [SEDIMENTATION RATE] IN BLOOD BY WESTERGREN METHOD: 14 MM/H
EST. GFR  (AFRICAN AMERICAN): >60 ML/MIN/1.73 M^2
EST. GFR  (NON AFRICAN AMERICAN): >60 ML/MIN/1.73 M^2
ESTIMATED PA SYSTOLIC PRESSURE: 18.32
FIO2: 40
GLUCOSE SERPL-MCNC: 168 MG/DL
GLUCOSE SERPL-MCNC: 208 MG/DL
GLUCOSE SERPL-MCNC: 208 MG/DL
HCO3 UR-SCNC: 20.4 MMOL/L (ref 24–28)
HCT VFR BLD AUTO: 39.3 %
HCT VFR BLD AUTO: 39.3 %
HGB BLD-MCNC: 13.8 G/DL
HGB BLD-MCNC: 13.8 G/DL
LACTATE SERPL-SCNC: 2 MMOL/L
LYMPHOCYTES # BLD AUTO: 0.9 K/UL
LYMPHOCYTES # BLD AUTO: 0.9 K/UL
LYMPHOCYTES NFR BLD: 11.5 %
LYMPHOCYTES NFR BLD: 11.5 %
MAGNESIUM SERPL-MCNC: 2.2 MG/DL
MCH RBC QN AUTO: 29.9 PG
MCH RBC QN AUTO: 29.9 PG
MCHC RBC AUTO-ENTMCNC: 35.1 G/DL
MCHC RBC AUTO-ENTMCNC: 35.1 G/DL
MCV RBC AUTO: 85 FL
MCV RBC AUTO: 85 FL
MODE: ABNORMAL
MONOCYTES # BLD AUTO: 0.7 K/UL
MONOCYTES # BLD AUTO: 0.7 K/UL
MONOCYTES NFR BLD: 9.9 %
MONOCYTES NFR BLD: 9.9 %
NEUTROPHILS # BLD AUTO: 5.8 K/UL
NEUTROPHILS # BLD AUTO: 5.8 K/UL
NEUTROPHILS NFR BLD: 80.8 %
NEUTROPHILS NFR BLD: 80.8 %
PCO2 BLDA: 28 MMHG (ref 35–45)
PEEP: 5
PH SMN: 7.47 [PH] (ref 7.35–7.45)
PLATELET # BLD AUTO: 237 K/UL
PLATELET # BLD AUTO: 237 K/UL
PMV BLD AUTO: 9.7 FL
PMV BLD AUTO: 9.7 FL
PO2 BLDA: 52 MMHG (ref 80–100)
POC BE: -3 MMOL/L
POC SATURATED O2: 89 % (ref 95–100)
POCT GLUCOSE: 174 MG/DL (ref 70–110)
POTASSIUM SERPL-SCNC: 2.9 MMOL/L
POTASSIUM SERPL-SCNC: 2.9 MMOL/L
POTASSIUM SERPL-SCNC: 5.1 MMOL/L
RBC # BLD AUTO: 4.61 M/UL
RBC # BLD AUTO: 4.61 M/UL
RETIRED EF AND QEF - SEE NOTES: 55 (ref 55–65)
SAMPLE: ABNORMAL
SITE: ABNORMAL
SODIUM SERPL-SCNC: 133 MMOL/L
SODIUM SERPL-SCNC: 134 MMOL/L
SODIUM SERPL-SCNC: 134 MMOL/L
VT: 420
WBC # BLD AUTO: 7.45 K/UL
WBC # BLD AUTO: 7.45 K/UL

## 2017-12-19 PROCEDURE — 99900037 HC PT THERAPY SCREENING (STAT)

## 2017-12-19 PROCEDURE — 25000003 PHARM REV CODE 250: Performed by: INTERNAL MEDICINE

## 2017-12-19 PROCEDURE — 82803 BLOOD GASES ANY COMBINATION: CPT

## 2017-12-19 PROCEDURE — 85025 COMPLETE CBC W/AUTO DIFF WBC: CPT

## 2017-12-19 PROCEDURE — 83735 ASSAY OF MAGNESIUM: CPT

## 2017-12-19 PROCEDURE — 63600175 PHARM REV CODE 636 W HCPCS: Performed by: INTERNAL MEDICINE

## 2017-12-19 PROCEDURE — 20000000 HC ICU ROOM

## 2017-12-19 PROCEDURE — 99900035 HC TECH TIME PER 15 MIN (STAT)

## 2017-12-19 PROCEDURE — 99223 1ST HOSP IP/OBS HIGH 75: CPT | Mod: ,,, | Performed by: INTERNAL MEDICINE

## 2017-12-19 PROCEDURE — 83605 ASSAY OF LACTIC ACID: CPT

## 2017-12-19 PROCEDURE — 25000003 PHARM REV CODE 250: Performed by: NURSE PRACTITIONER

## 2017-12-19 PROCEDURE — 36600 WITHDRAWAL OF ARTERIAL BLOOD: CPT

## 2017-12-19 PROCEDURE — 25000003 PHARM REV CODE 250: Performed by: SURGERY

## 2017-12-19 PROCEDURE — 94799 UNLISTED PULMONARY SVC/PX: CPT

## 2017-12-19 PROCEDURE — 36620 INSERTION CATHETER ARTERY: CPT | Mod: 59,,, | Performed by: NURSE PRACTITIONER

## 2017-12-19 PROCEDURE — 80048 BASIC METABOLIC PNL TOTAL CA: CPT | Mod: 91

## 2017-12-19 PROCEDURE — 36415 COLL VENOUS BLD VENIPUNCTURE: CPT

## 2017-12-19 PROCEDURE — 93306 TTE W/DOPPLER COMPLETE: CPT | Mod: 26,,, | Performed by: INTERNAL MEDICINE

## 2017-12-19 PROCEDURE — 93005 ELECTROCARDIOGRAM TRACING: CPT

## 2017-12-19 PROCEDURE — S0030 INJECTION, METRONIDAZOLE: HCPCS | Performed by: SURGERY

## 2017-12-19 PROCEDURE — 82040 ASSAY OF SERUM ALBUMIN: CPT

## 2017-12-19 PROCEDURE — 63600175 PHARM REV CODE 636 W HCPCS: Performed by: SURGERY

## 2017-12-19 PROCEDURE — 99292 CRITICAL CARE ADDL 30 MIN: CPT | Mod: 25,,, | Performed by: NURSE PRACTITIONER

## 2017-12-19 PROCEDURE — 82533 TOTAL CORTISOL: CPT

## 2017-12-19 PROCEDURE — 02HV33Z INSERTION OF INFUSION DEVICE INTO SUPERIOR VENA CAVA, PERCUTANEOUS APPROACH: ICD-10-PCS | Performed by: INTERNAL MEDICINE

## 2017-12-19 PROCEDURE — 93306 TTE W/DOPPLER COMPLETE: CPT

## 2017-12-19 PROCEDURE — 25000003 PHARM REV CODE 250

## 2017-12-19 PROCEDURE — 94003 VENT MGMT INPAT SUBQ DAY: CPT

## 2017-12-19 PROCEDURE — 36556 INSERT NON-TUNNEL CV CATH: CPT

## 2017-12-19 PROCEDURE — 93010 ELECTROCARDIOGRAM REPORT: CPT | Mod: ,,, | Performed by: INTERNAL MEDICINE

## 2017-12-19 PROCEDURE — 63600175 PHARM REV CODE 636 W HCPCS: Performed by: NURSE PRACTITIONER

## 2017-12-19 PROCEDURE — 99291 CRITICAL CARE FIRST HOUR: CPT | Mod: 25,,, | Performed by: NURSE PRACTITIONER

## 2017-12-19 PROCEDURE — 36556 INSERT NON-TUNNEL CV CATH: CPT | Mod: ,,, | Performed by: NURSE PRACTITIONER

## 2017-12-19 PROCEDURE — S0028 INJECTION, FAMOTIDINE, 20 MG: HCPCS | Performed by: INTERNAL MEDICINE

## 2017-12-19 PROCEDURE — 36620 INSERTION CATHETER ARTERY: CPT

## 2017-12-19 RX ORDER — MEROPENEM 1 G/1
1 INJECTION, POWDER, FOR SOLUTION INTRAVENOUS
Status: DISCONTINUED | OUTPATIENT
Start: 2017-12-19 | End: 2017-12-20

## 2017-12-19 RX ORDER — MAGNESIUM SULFATE 1 G/100ML
1 INJECTION INTRAVENOUS ONCE
Status: COMPLETED | OUTPATIENT
Start: 2017-12-19 | End: 2017-12-19

## 2017-12-19 RX ORDER — DIGOXIN 0.25 MG/ML
500 INJECTION INTRAMUSCULAR; INTRAVENOUS ONCE
Status: COMPLETED | OUTPATIENT
Start: 2017-12-19 | End: 2017-12-19

## 2017-12-19 RX ORDER — POTASSIUM CHLORIDE 29.8 MG/ML
40 INJECTION INTRAVENOUS EVERY 4 HOURS
Status: COMPLETED | OUTPATIENT
Start: 2017-12-19 | End: 2017-12-19

## 2017-12-19 RX ORDER — METOPROLOL TARTRATE 1 MG/ML
5 INJECTION, SOLUTION INTRAVENOUS EVERY 10 MIN PRN
Status: COMPLETED | OUTPATIENT
Start: 2017-12-19 | End: 2017-12-20

## 2017-12-19 RX ORDER — GLUCAGON 1 MG
1 KIT INJECTION
Status: DISCONTINUED | OUTPATIENT
Start: 2017-12-19 | End: 2017-12-24 | Stop reason: HOSPADM

## 2017-12-19 RX ORDER — FENTANYL CITRAT/DEXTROSE 5%/PF 100 MCG/10
PATIENT CONTROLLED ANALGESIA SYRINGE INTRAVENOUS CONTINUOUS
Status: DISCONTINUED | OUTPATIENT
Start: 2017-12-19 | End: 2017-12-19

## 2017-12-19 RX ORDER — DIGOXIN 0.25 MG/ML
250 INJECTION INTRAMUSCULAR; INTRAVENOUS ONCE
Status: COMPLETED | OUTPATIENT
Start: 2017-12-19 | End: 2017-12-19

## 2017-12-19 RX ORDER — SODIUM CHLORIDE 9 MG/ML
INJECTION, SOLUTION INTRAVENOUS CONTINUOUS
Status: DISCONTINUED | OUTPATIENT
Start: 2017-12-19 | End: 2017-12-24 | Stop reason: HOSPADM

## 2017-12-19 RX ORDER — NOREPINEPHRINE BITARTRATE/D5W 4MG/250ML
0.02 PLASTIC BAG, INJECTION (ML) INTRAVENOUS CONTINUOUS
Status: DISCONTINUED | OUTPATIENT
Start: 2017-12-19 | End: 2017-12-19

## 2017-12-19 RX ORDER — DIGOXIN 0.25 MG/ML
500 INJECTION INTRAMUSCULAR; INTRAVENOUS ONCE
Status: DISCONTINUED | OUTPATIENT
Start: 2017-12-19 | End: 2017-12-19

## 2017-12-19 RX ORDER — INSULIN ASPART 100 [IU]/ML
1-10 INJECTION, SOLUTION INTRAVENOUS; SUBCUTANEOUS EVERY 6 HOURS PRN
Status: DISCONTINUED | OUTPATIENT
Start: 2017-12-19 | End: 2017-12-24 | Stop reason: HOSPADM

## 2017-12-19 RX ORDER — DIGOXIN 0.25 MG/ML
250 INJECTION INTRAMUSCULAR; INTRAVENOUS ONCE
Status: DISCONTINUED | OUTPATIENT
Start: 2017-12-19 | End: 2017-12-19

## 2017-12-19 RX ADMIN — METRONIDAZOLE 500 MG: 500 INJECTION, SOLUTION INTRAVENOUS at 12:12

## 2017-12-19 RX ADMIN — METRONIDAZOLE 500 MG: 500 INJECTION, SOLUTION INTRAVENOUS at 09:12

## 2017-12-19 RX ADMIN — CHLORHEXIDINE GLUCONATE 10 ML: 1.2 RINSE ORAL at 12:12

## 2017-12-19 RX ADMIN — Medication 15 MG/HR: at 12:12

## 2017-12-19 RX ADMIN — CALCIUM GLUCONATE 1000 MG: 94 INJECTION, SOLUTION INTRAVENOUS at 06:12

## 2017-12-19 RX ADMIN — Medication 0.12 MCG/KG/MIN: at 05:12

## 2017-12-19 RX ADMIN — MEROPENEM 1 G: 1 INJECTION, POWDER, FOR SOLUTION INTRAVENOUS at 08:12

## 2017-12-19 RX ADMIN — Medication 50 MCG/HR: at 08:12

## 2017-12-19 RX ADMIN — DEXMEDETOMIDINE HYDROCHLORIDE 0.5 MCG/KG/HR: 4 INJECTION, SOLUTION INTRAVENOUS at 05:12

## 2017-12-19 RX ADMIN — FAMOTIDINE 20 MG: 10 INJECTION, SOLUTION INTRAVENOUS at 09:12

## 2017-12-19 RX ADMIN — ENOXAPARIN SODIUM 40 MG: 100 INJECTION SUBCUTANEOUS at 01:12

## 2017-12-19 RX ADMIN — SODIUM CHLORIDE 500 ML: 0.9 INJECTION, SOLUTION INTRAVENOUS at 06:12

## 2017-12-19 RX ADMIN — METRONIDAZOLE 500 MG: 500 INJECTION, SOLUTION INTRAVENOUS at 05:12

## 2017-12-19 RX ADMIN — CIPROFLOXACIN 400 MG: 2 INJECTION, SOLUTION INTRAVENOUS at 12:12

## 2017-12-19 RX ADMIN — MAGNESIUM SULFATE IN DEXTROSE 1 G: 10 INJECTION, SOLUTION INTRAVENOUS at 08:12

## 2017-12-19 RX ADMIN — SODIUM CHLORIDE: 0.9 INJECTION, SOLUTION INTRAVENOUS at 12:12

## 2017-12-19 RX ADMIN — DIGOXIN 500 MCG: 0.25 INJECTION INTRAMUSCULAR; INTRAVENOUS at 08:12

## 2017-12-19 RX ADMIN — DIGOXIN 250 MCG: 0.25 INJECTION INTRAMUSCULAR; INTRAVENOUS at 12:12

## 2017-12-19 RX ADMIN — POTASSIUM CHLORIDE 40 MEQ: 400 INJECTION, SOLUTION INTRAVENOUS at 09:12

## 2017-12-19 RX ADMIN — POTASSIUM CHLORIDE 40 MEQ: 400 INJECTION, SOLUTION INTRAVENOUS at 01:12

## 2017-12-19 RX ADMIN — Medication 15 MG/HR: at 03:12

## 2017-12-19 RX ADMIN — NOREPINEPHRINE BITARTRATE 0.22 MCG/KG/MIN: 1 INJECTION, SOLUTION, CONCENTRATE INTRAVENOUS at 09:12

## 2017-12-19 RX ADMIN — INSULIN ASPART 2 UNITS: 100 INJECTION, SOLUTION INTRAVENOUS; SUBCUTANEOUS at 12:12

## 2017-12-19 RX ADMIN — MEROPENEM 1 G: 1 INJECTION, POWDER, FOR SOLUTION INTRAVENOUS at 03:12

## 2017-12-19 RX ADMIN — MORPHINE SULFATE 2 MG: 4 INJECTION, SOLUTION INTRAMUSCULAR; INTRAVENOUS at 03:12

## 2017-12-19 RX ADMIN — CHLORHEXIDINE GLUCONATE 15 ML: 1.2 RINSE ORAL at 08:12

## 2017-12-19 RX ADMIN — Medication 0.02 MCG/KG/MIN: at 05:12

## 2017-12-19 RX ADMIN — METOROPROLOL TARTRATE 5 MG: 5 INJECTION, SOLUTION INTRAVENOUS at 09:12

## 2017-12-19 NOTE — NURSING
Patient had total of 1500ml bolus of normal saline over night- 70's - 80's and DBP- 40-50's , md notified.Patient  to start levophed drip, ONLY WITH PERIPHERAL ACCESS AT TIME OF REPORTING ,18GAUZE to LAC, with good blood return was flushed and levophed drip commenced @ 0528am @ 0.02mcg/kg/min.site is being observed.

## 2017-12-19 NOTE — SUBJECTIVE & OBJECTIVE
Interval History: s/p transverse colectomy with creation of colostomy. Biopsy of liver lesion. Pt now intubated in icu. In sinus rhythm.     Medications:  Continuous Infusions:   sodium chloride 0.9% 150 mL/hr at 12/19/17 0520    fentanyl 50 mcg/hr (12/19/17 0809)    norepinephrine bitartrate-D5W 0.22 mcg/kg/min (12/19/17 0900)     Scheduled Meds:   calcium gluconate IVPB  1,000 mg Intravenous Once    chlorhexidine  15 mL Mouth/Throat BID    ciprofloxacin  400 mg Intravenous Q12H    digoxin  250 mcg Intravenous Once    enoxparin  40 mg Subcutaneous Q24H    famotidine (PF)  20 mg Intravenous BID    lactated ringers  500 mL Intravenous Once    meropenem (MERREM) IVPB  1 g Intravenous Q8H    metronidazole  500 mg Intravenous Q8H    nozaseptin   Each Nare BID    potassium chloride  40 mEq Intravenous Q4H    senna-docusate 8.6-50 mg  1 tablet Oral BID     PRN Meds:acetaminophen, bisacodyl, cloNIDine, dextrose 50%, diphenhydrAMINE, glucagon (human recombinant), hydrocodone-acetaminophen 5-325mg, insulin aspart, lactulose, metoprolol, morphine, ondansetron, ondansetron, promethazine, ramelteon     Review of patient's allergies indicates:  No Known Allergies  Objective:     Vital Signs (Most Recent):  Temp: 99.2 °F (37.3 °C) (12/19/17 0545)  Pulse: 104 (12/19/17 0840)  Resp: (!) 24 (12/19/17 0840)  BP: 93/73 (12/19/17 0630)  SpO2: (!) 93 % (12/19/17 0840) Vital Signs (24h Range):  Temp:  [98.2 °F (36.8 °C)-99.8 °F (37.7 °C)] 99.2 °F (37.3 °C)  Pulse:  [] 104  Resp:  [17-34] 24  SpO2:  [91 %-99 %] 93 %  BP: ()/() 93/73     Weight: 74.4 kg (164 lb 0.4 oz)  Body mass index is 27.29 kg/m².    Intake/Output - Last 3 Shifts       12/17 0700 - 12/18 0659 12/18 0700 - 12/19 0659 12/19 0700 - 12/20 0659    I.V. (mL/kg)  180.2 (2.4)     IV Piggyback  800     Total Intake(mL/kg)  980.2 (13.2)     Urine (mL/kg/hr)  215     Drains  350     Stool  0     Total Output   565      Net   +415.2              Urine Occurrence  300 x     Stool Occurrence  1 x           Physical Exam   Constitutional: He appears well-developed.   Acutely ill appearing   HENT:   Head: Normocephalic and atraumatic.   Eyes: EOM are normal.   Cardiovascular: Normal rate and regular rhythm.    Pulmonary/Chest: No respiratory distress.   Intubated, on vent   Abdominal: He exhibits distension.   Colostomy is red, mildly edematous, viable. Dark brown effluent.    NG with bilious output. .    Musculoskeletal: Normal range of motion.   Neurological:   resonsive, on vent   Skin: Skin is warm and dry.   Psychiatric: He has a normal mood and affect. Thought content normal.   Vitals reviewed.      Significant Labs:  CBC:   Recent Labs  Lab 12/19/17  0352   WBC 7.45  7.45   RBC 4.61  4.61   HGB 13.8*  13.8*   HCT 39.3*  39.3*     237   MCV 85  85   MCH 29.9  29.9   MCHC 35.1  35.1     CMP:   Recent Labs  Lab 12/18/17  0900 12/19/17  0352 12/19/17  0908   * 208*  208*  --    CALCIUM 9.3 5.7*  5.7*  --    ALBUMIN 3.1*  --  1.9*   PROT 6.7  --   --    * 134*  134*  --    K 4.0 2.9*  2.9*  --    CO2 23 16*  16*  --    CL 89* 107  107  --    BUN 26* 19  19  --    CREATININE 0.8 0.7  0.7  --    ALKPHOS 80  --   --    ALT 18  --   --    AST 28  --   --    BILITOT 1.0  --   --      ABGs:   Recent Labs  Lab 12/19/17  0507   PH 7.472*   PCO2 28.0*   PO2 52*   HCO3 20.4*   POCSATURATED 89*   BE -3       Significant Diagnostics:  I have reviewed all pertinent imaging results/findings within the past 24 hours.

## 2017-12-19 NOTE — CONSULTS
"Consulted on this 78 y/o M patient for new colostomy.  He was admitted with abdominal pain on 12/18. He underwent and exploratory lap and colostomy the evening of 12/18, and is currently in ICU, intubated, but alert. pateint's wife is at bedside, she has mild expressive aphasia s/p CVA.  Stoma assessed at this time. Pouch intact with no leak, small amount brown liquid stool noted in pouch. Emptied at this time.  Colostomy education initiated at this time, but will need lots of reinforcement.  Received verbal permission to enroll in sample programs x3.  Patient's daughter Kimberly arrived at end of visit and ostomy education started with her as well.  She lives in [place], but is planning to be here for at least the next month with her parents.  Plan for discharge home with  vs SNF depending on patient's recovery. Will follow closely.     Please review Saskia's procedure "Pouching : Colostomy or Ileostomy" for instructions on ostomy.stoma care. Change ostomy appliance weekly or IMMEDIATELY IF LEAKING. All ostomy care supplies can be requested from materials management.        OSTOMY CARE SUPPLIES:  One Piece Pouch #[id number]    Brava Stoma Ring #[id number]    Cavilon [place] #[id number]    Ostomy Paste #[id number]       "

## 2017-12-19 NOTE — ASSESSMENT & PLAN NOTE
Nutrition Diagnosis:  Inadequate energy intake    Related to (etiology):   Altered GI tract function    Signs and Symptoms (as evidenced by):   Bowel obstruction     Interventions/Recommendations (treatment strategy):  1. Continue Regular diet    Nutrition Diagnosis Status:   Improving (patient is on oral diet)

## 2017-12-19 NOTE — CONSULTS
[facility] -   General Surgery  Consult Note    Patient Name: Herb Sumner  MRN: [id number]  Code Status: No Order  Admission Date: 12/18/2017  Hospital Length of Stay: 0 days  Attending Physician: Anjel Strickland MD  Primary Care Provider: Primary Doctor No    Patient information was obtained from patient and ER records.     Consults  Subjective:     Principal Problem: Bowel obstruction    History of Present Illness: Mr. Sumner is 77 year-old male was admitted via ED for the management of acute abdomen with a history of obstipation for last several days. He was complaining of severe abdominal distension with pain. He also complained of nausea and vomited few times. He has not been eating for last 4 days and feels very weak. He denies of abdominal surgery. He is also very into the natural herbal medicine to treat his aliments.    No current facility-administered medications on file prior to encounter.      No current outpatient prescriptions on file prior to encounter.       Review of patient's allergies indicates:  No Known Allergies    Past Medical History:   Diagnosis Date    Hyperlipidemia      Past Surgical History:   Procedure Laterality Date    BACK SURGERY       Family History     Problem Relation (Age of Onset)    Stroke Mother        Social History Main Topics    Smoking status: Former Smoker    Smokeless tobacco: Not on file    Alcohol use 4.2 oz/week     7 Glasses of wine per week    Drug use: No    Sexual activity: Not on file     Review of Systems   Constitutional: Positive for activity change, appetite change, fatigue and unexpected weight change. Negative for chills and fever.   HENT: Negative for sore throat and trouble swallowing.    Eyes: Negative.    Respiratory: Negative for cough and shortness of breath.    Cardiovascular: Negative.    Gastrointestinal: Positive for abdominal distention, abdominal pain, nausea and vomiting.   Endocrine: Negative.    Genitourinary:  Negative.    Musculoskeletal: Negative.    Skin: Negative.    Allergic/Immunologic: Negative.    Neurological: Negative.    Hematological: Does not bruise/bleed easily.   Psychiatric/Behavioral: Negative.      Objective:     Vital Signs (Most Recent):  Temp: 98.3 °F (36.8 °C) (12/18/17 1912)  Pulse: 95 (12/18/17 1532)  Resp: (!) 34 (12/18/17 1532)  BP: (!) 168/101 (12/18/17 1532)  SpO2: (!) 94 % (12/18/17 1532) Vital Signs (24h Range):  Temp:  [98.2 °F (36.8 °C)-99.1 °F (37.3 °C)] 98.3 °F (36.8 °C)  Pulse:  [] 95  Resp:  [18-34] 34  SpO2:  [94 %-97 %] 94 %  BP: (129-189)/() 168/101     Weight: 74.4 kg (164 lb 0.4 oz)  Body mass index is 27.29 kg/m².    Physical Exam   Constitutional: He is oriented to person, place, and time. He appears well-developed and well-nourished.   HENT:   Head: Normocephalic.   Right Ear: External ear normal.   Left Ear: External ear normal.   Nose: Nose normal.   Eyes: Pupils are equal, round, and reactive to light. No scleral icterus.   Neck: Normal range of motion. Neck supple. No thyromegaly present.   Cardiovascular: Normal rate, regular rhythm and normal heart sounds.    No murmur heard.  Pulmonary/Chest: Effort normal and breath sounds normal.   Abdominal: Soft. Bowel sounds are normal. He exhibits distension. There is no rebound and no guarding. No hernia.   Musculoskeletal: Normal range of motion.   Lymphadenopathy:     He has no cervical adenopathy.   Neurological: He is alert and oriented to person, place, and time.   Skin: Skin is warm and dry.       Significant Labs:  CBC:   Recent Labs  Lab 12/18/17  0900   WBC 5.38   RBC 5.06   HGB 14.8   HCT 42.2      MCV 83   MCH 29.2   MCHC 35.1     CMP:   Recent Labs  Lab 12/18/17  0900   *   CALCIUM 9.3   ALBUMIN 3.1*   PROT 6.7   *   K 4.0   CO2 23   CL 89*   BUN 26*   CREATININE 0.8   ALKPHOS 80   ALT 18   AST 28   BILITOT 1.0       Significant Diagnostics:  I have reviewed and interpreted all pertinent  imaging results/findings within the past 24 hours.    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 Will require emergent exploration with colostomy and decompression.          VTE Risk Mitigation         Ordered     Place sequential compression device  Until discontinued      12/18/17 8652          Thank you for your consult. I will follow-up with patient. Please contact us if you have any additional questions.      David Ballard MD  General Surgery  [facility]

## 2017-12-19 NOTE — PLAN OF CARE
Met with daughter, Kimberly Sumner. She stated that she is working with her parent's attorneys to retrieve the Durable Power of . She stated that her parents had named each other as each other's POA but it had been changed to name her as the POA. She has requested copies of the document and will provide them to the hospital when she receives them. She was informed of the determination of NOK in lieu of documentation with spouse being NOK then in her absence the adult children will be the NOK. Daughter is the oldest of two children, her brother is in route to the hospital from his home in [place], [place].   Patient verified that her father is self pay. She stated that he normally makes a payment plan for hospitalizations. Notified Fiordaliza Stark, financial counselor of patient's status and need to make a payment plan.   Daughter stated that he does not go to a doctor routinely. If she had to name a PCP, it would be Dr. Matute at the [facility].   Provided Discharge Planning Begins upon Admission, Discharge Planning Packet including Advance Directive Admission, [facility] Delivery information and contact information for . Explained role of case management in the transition of care.     12/19/17 1431   Discharge Assessment   Assessment Type Discharge Planning Assessment   Confirmed/corrected address and phone number on facesheet? Yes   Assessment information obtained from? Medical Record;Other  (daughter, Kimberly Sumner, [phone])   Prior to hospitilization cognitive status: Alert/Oriented   Prior to hospitalization functional status: Independent   Current cognitive status: Unable to Assess   Facility Arrived From: (home)   Lives With spouse   Able to Return to Prior Arrangements unable to determine at this time (comments)   Patient's perception of discharge disposition home or selfcare   Readmission Within The Last 30 Days no previous admission in last 30 days   Patient currently  being followed by outpatient case management? No   Equipment Currently Used at Home none   Do you have any problems affording any of your prescribed medications? No   Is the patient taking medications as prescribed? yes   Does the patient have transportation home? Yes   Transportation Available family or friend will provide   Discharge Plan A Home

## 2017-12-19 NOTE — SUBJECTIVE & OBJECTIVE
No current facility-administered medications on file prior to encounter.      No current outpatient prescriptions on file prior to encounter.       Review of patient's allergies indicates:  No Known Allergies    Past Medical History:   Diagnosis Date    Hyperlipidemia      Past Surgical History:   Procedure Laterality Date    BACK SURGERY       Family History     Problem Relation (Age of Onset)    Stroke Mother        Social History Main Topics    Smoking status: Former Smoker    Smokeless tobacco: Not on file    Alcohol use 4.2 oz/week     7 Glasses of wine per week    Drug use: No    Sexual activity: Not on file     Review of Systems   Constitutional: Positive for activity change, appetite change, fatigue and unexpected weight change. Negative for chills and fever.   HENT: Negative for sore throat and trouble swallowing.    Eyes: Negative.    Respiratory: Negative for cough and shortness of breath.    Cardiovascular: Negative.    Gastrointestinal: Positive for abdominal distention, abdominal pain, nausea and vomiting.   Endocrine: Negative.    Genitourinary: Negative.    Musculoskeletal: Negative.    Skin: Negative.    Allergic/Immunologic: Negative.    Neurological: Negative.    Hematological: Does not bruise/bleed easily.   Psychiatric/Behavioral: Negative.      Objective:     Vital Signs (Most Recent):  Temp: 98.3 °F (36.8 °C) (12/18/17 1912)  Pulse: 95 (12/18/17 1532)  Resp: (!) 34 (12/18/17 1532)  BP: (!) 168/101 (12/18/17 1532)  SpO2: (!) 94 % (12/18/17 1532) Vital Signs (24h Range):  Temp:  [98.2 °F (36.8 °C)-99.1 °F (37.3 °C)] 98.3 °F (36.8 °C)  Pulse:  [] 95  Resp:  [18-34] 34  SpO2:  [94 %-97 %] 94 %  BP: (129-189)/() 168/101     Weight: 74.4 kg (164 lb 0.4 oz)  Body mass index is 27.29 kg/m².    Physical Exam   Constitutional: He is oriented to person, place, and time. He appears well-developed and well-nourished.   HENT:   Head: Normocephalic.   Right Ear: External ear normal.   Left  Ear: External ear normal.   Nose: Nose normal.   Eyes: Pupils are equal, round, and reactive to light. No scleral icterus.   Neck: Normal range of motion. Neck supple. No thyromegaly present.   Cardiovascular: Normal rate, regular rhythm and normal heart sounds.    No murmur heard.  Pulmonary/Chest: Effort normal and breath sounds normal.   Abdominal: Soft. Bowel sounds are normal. He exhibits distension. There is no rebound and no guarding. No hernia.   Musculoskeletal: Normal range of motion.   Lymphadenopathy:     He has no cervical adenopathy.   Neurological: He is alert and oriented to person, place, and time.   Skin: Skin is warm and dry.       Significant Labs:  CBC:   Recent Labs  Lab 12/18/17  0900   WBC 5.38   RBC 5.06   HGB 14.8   HCT 42.2      MCV 83   MCH 29.2   MCHC 35.1     CMP:   Recent Labs  Lab 12/18/17  0900   *   CALCIUM 9.3   ALBUMIN 3.1*   PROT 6.7   *   K 4.0   CO2 23   CL 89*   BUN 26*   CREATININE 0.8   ALKPHOS 80   ALT 18   AST 28   BILITOT 1.0       Significant Diagnostics:  I have reviewed and interpreted all pertinent imaging results/findings within the past 24 hours.

## 2017-12-19 NOTE — TRANSFER OF CARE
"Anesthesia Transfer of Care Note    Patient: Herb Sumner    Procedure(s) Performed: Procedure(s) (LRB):  LAPAROTOMY-EXPLORATORY (N/A)  COLOSTOMY (Left)  SIGMOIDECTOMY (N/A)  BIOPSY-LIVER (N/A)    Patient location: ICU    Anesthesia Type: general    Transport from OR: Transported from OR intubated on 100% O2 by AMBU with adequate controlled ventilation. Upon arrival to PACU/ICU, patient attached to ventilator and auscultated to confirm bilateral breath sounds and adequate TV. Continuous ECG monitoring in transport. Continuous SpO2 monitoring in transport    Post pain: adequate analgesia    Post assessment: no apparent anesthetic complications    Post vital signs: stable (A-fib RVR with stable BP)    Level of consciousness: sedated    Nausea/Vomiting: no nausea/vomiting    Complications: none    Transfer of care protocol was followedComments: Dr Kraft present for transport and ICU hand off      Last vitals:   Visit Vitals  /76   Pulse (!) 139   Temp 36.8 °C (98.2 °F) (Oral)   Resp (!) 33   Ht 5' 5" (1.651 m)   Wt 74.4 kg (164 lb 0.4 oz)   SpO2 (!) 93%   BMI 27.29 kg/m²     "

## 2017-12-19 NOTE — CONSULTS
[facility]  Critical Care Medicine  Consult Note    Patient Name: Herb Sumner  MRN: [id number]  Admission Date: 12/18/2017  Hospital Length of Stay: 1 days  Code Status: Full Code  Attending Physician: Anjel Strickland MD   Primary Care Provider: Primary Doctor No   Principal Problem: Bowel obstruction      Subjective:     HPI:  Mr Sumner is a 76 yo WM with a PMH of HLD and daily mild etoh consumption.  He presented to [facility] ED last night with complaints of 2 week hx of worsening constant abd pain, abd distention, N/V, loss of appetite, malaise and obstipation of mod to severe severity.  CT Abd in ED showed multiple liver lesions as well as distended small bowel and colon, Na 125.  Surgery consulted and patient taken to OR by Dr. Ballard finding severely dilated small bowel was found to be ischemic due to pressure/distension. The entire colon was severely distended, and the malignant stricture was noted at the distal end of descending colon. Liver metastatic lesions were noted and excisional biopsy done.  He also underwent Expl Lap with Colostomy.  He was admitted to ICU post op intubated on mech ventilation and hypotensive.  He was also in A-fib on Cardizem and Levophed infusions.     Hospital/ICU Course:  12/19 - fully awake and responsive on mech ventilation on Levophed, Cardizem and Precedex infusions still hypotensive in A-Fib at 112 bpm.    Past Medical History:   Diagnosis Date    EtOH dependence     Hyperlipemia     Hyperlipidemia        Past Surgical History:   Procedure Laterality Date    BACK SURGERY         Review of patient's allergies indicates:  No Known Allergies    Family History     Problem Relation (Age of Onset)    Stroke Mother        Social History Main Topics    Smoking status: Former Smoker     Packs/day: 2.00     Years: 20.00    Smokeless tobacco: Not on file      Comment: quit 40 years ago    Alcohol use 4.2 oz/week     7 Glasses of wine per week      Comment:  "whiskey and scotch 2 "jiggers" nightly    Drug use: No    Sexual activity: Not on file         Review of Systems   Unable to perform ROS: Intubated     Objective:     Vital Signs (Most Recent):  Temp: 99.2 °F (37.3 °C) (12/19/17 0545)  Pulse: 104 (12/19/17 0840)  Resp: (!) 24 (12/19/17 0840)  BP: 93/73 (12/19/17 0630)  SpO2: (!) 93 % (12/19/17 0840) Vital Signs (24h Range):  Temp:  [98.2 °F (36.8 °C)-99.8 °F (37.7 °C)] 99.2 °F (37.3 °C)  Pulse:  [] 104  Resp:  [17-34] 24  SpO2:  [91 %-99 %] 93 %  BP: ()/() 93/73     Weight: 74.4 kg (164 lb 0.4 oz)  Body mass index is 27.29 kg/m².      Intake/Output Summary (Last 24 hours) at 12/19/17 0853  Last data filed at 12/19/17 0615   Gross per 24 hour   Intake           980.19 ml   Output              565 ml   Net           415.19 ml       Physical Exam   Constitutional: He appears well-developed and well-nourished. He is cooperative.  Non-toxic appearance. He has a sickly appearance. He appears ill. No distress. He is sedated, intubated and restrained.   HENT:   Head: Normocephalic and atraumatic.   Mouth/Throat: Oropharynx is clear and moist and mucous membranes are normal.   Eyes: EOM and lids are normal. Pupils are equal, round, and reactive to light.   Neck: Trachea normal and normal range of motion. Carotid bruit is not present.       Cardiovascular: Normal heart sounds.  An irregular rhythm present. Tachycardia present.    Pulses:       Radial pulses are 1+ on the right side, and 1+ on the left side.        Dorsalis pedis pulses are 0 on the right side, and 0 on the left side.   bilat feet warm and no cyanosis   Pulmonary/Chest: Effort normal. No accessory muscle usage. He is intubated. No respiratory distress. He has decreased breath sounds. He has rales.   Abdominal: He exhibits no distension. Bowel sounds are absent. There is generalized tenderness.       Genitourinary: Penis normal.   Genitourinary Comments: Briggs in place   Musculoskeletal: " Normal range of motion.        Right foot: There is no deformity.        Left foot: There is no deformity.   No edema   Lymphadenopathy:     He has no cervical adenopathy.   Neurological: He is alert.   Fully awake and alert and responsive following all commands   Skin: Skin is warm and dry. Capillary refill takes less than 2 seconds. No rash noted. No cyanosis.        Psychiatric: He has a normal mood and affect. His behavior is normal.       Vents:  Vent Mode: Spont (12/19/17 0840)  Ventilator Initiated: Yes (12/18/17 2300)  Set Rate: 0 bmp (12/19/17 0840)  Vt Set: 420 mL (12/19/17 0840)  Pressure Support: 10 cmH20 (12/19/17 0840)  PEEP/CPAP: 10 cmH20 (12/19/17 0840)  Oxygen Concentration (%): 40 (12/19/17 0840)  Peak Airway Pressure: 21 cmH2O (12/19/17 0840)  Plateau Pressure: 0 cmH20 (12/19/17 0840)  Total Ve: 11.8 mL (12/19/17 0840)  F/VT Ratio<105 (RSBI): (!) 50.53 (12/19/17 0840)    Lines/Drains/Airways     Central Venous Catheter Line                 Percutaneous Central Line Insertion/Assessment - triple lumen  12/19/17 0740 left internal jugular less than 1 day          Drain                 Colostomy 12/18/17 2300 Descending/sigmoid LLQ less than 1 day         NG/OG Tube 12/18/17 1546 nasogastric 16 Fr. Right nostril less than 1 day         Urethral Catheter 12/18/17 2100 Latex;Straight-tip 16 Fr. less than 1 day          Airway                 Airway - Non-Surgical 12/18/17 2000 Endotracheal Tube less than 1 day          Peripheral Intravenous Line                 Peripheral IV - Single Lumen 12/18/17 0900 Left Antecubital less than 1 day         Peripheral IV - Single Lumen 12/18/17 2300 Right Hand less than 1 day                Significant Labs:    CBC/Anemia Profile:    Recent Labs  Lab 12/18/17  0900 12/19/17  0352   WBC 5.38 7.45  7.45   HGB 14.8 13.8*  13.8*   HCT 42.2 39.3*  39.3*    237  237   MCV 83 85  85   RDW 13.0 13.6  13.6        Chemistries:    Recent Labs  Lab 12/18/17  6288  12/19/17  0352   * 134*  134*   K 4.0 2.9*  2.9*   CL 89* 107  107   CO2 23 16*  16*   BUN 26* 19  19   CREATININE 0.8 0.7  0.7   CALCIUM 9.3 5.7*  5.7*   ALBUMIN 3.1*  --    PROT 6.7  --    BILITOT 1.0  --    ALKPHOS 80  --    ALT 18  --    AST 28  --    MG 1.8  --        ABGs:   Recent Labs  Lab 12/19/17  0507   PH 7.472*   PCO2 28.0*   HCO3 20.4*   POCSATURATED 89*   BE -3     Coagulation:   Recent Labs  Lab 12/18/17  0900   INR 1.2   APTT 28.0     All pertinent labs within the past 24 hours have been reviewed.    Significant Imaging:   CXR: I have reviewed all pertinent results/findings within the past 24 hours and my personal findings are:  mild pulm edema    Assessment/Plan:     Pulmonary   Acute hypoxemic respiratory failure    Cont full vent support  Vent settings reviewed and adjusted  Wean FiO2  Attempt SBT later today  Dano SAT fully awake and responsive        Cardiac/Vascular   Atrial fibrillation with rapid ventricular response    Start Dig and gave IV lopressor X 1 converted to NSR  Stop Cardizem infusion  ICU cardiac monitoring        Renal/   Hypokalemia    Replace K+ IV and repeat labs this PM        Oncology   Malignant neoplasm of descending colon    Path pending with liver bx pending        Endocrine   Hyperglycemia, unspecified    Add SSI        GI   * Bowel obstruction    S/P Expl Lap with Colostomy  Surgery following        Liver masses    Path from bx pending        S/P colostomy    Surgery following        Small bowel ischemia    Surgery following  S/P Colostomy  Cont Merrem, Cipro and Flagyl        Other   Shock    Echo pending  Wean Levophed as tolerated               Preventive Measures and Monitoring:   Stress Ulcer: Pepcid  Nutrition: NPO  Glucose control: SSI  Bowel prophylaxis: Senokot and PRN Dulcolax  DVT prophylaxis: LMWH/SCDs  Hx CAD on B-Blocker: no hx CAD  Head of Bed/Reposition: Elevate HOB and turn Q1-2 hours   Early Mobility: OOB once more stable  SAT/SBT:  daily  RASS goal: -1  Vent Day: #1  NG Day: #2  Central Line Left IJ Day: #1  Right Radial Arterial Line Day: #1  Briggs Day: #1  IVAB Day: #1  Code Status: Full  Pneumonia Vaccine: ordered  Flu Vaccine: ordered    Counseling/Consultation:I have discussed the care of this patient in detail with the bedside nursing staff and Dr. Gillette, Dr. Strickland and Dr. Ballard    Critical Care Time: 85 minutes  Critical secondary to Patient has a condition that poses threat to life and bodily function: Resp [facility] on [facility]  Patient is currently on drug therapy requiring intensive monitoring for toxicity: Levophed infusion  Patient is currently receiving parenteral controlled substances: Fentanyl infusion     Critical care was time spent personally by me on the following activities: development of treatment plan with patient or surrogate and bedside caregivers, discussions with consultants, evaluation of patient's response to treatment, examination of patient, ordering and performing treatments and interventions, ordering and review of laboratory studies, ordering and review of radiographic studies, pulse oximetry, re-evaluation of patient's condition. This critical care time did not overlap with that of any other provider or involve time for any procedures.    Thank you for your consult. I will follow-up with patient. Please contact us if you have any additional questions.     Denzel Arzate NP  Critical Care Medicine  [facility]

## 2017-12-19 NOTE — PLAN OF CARE
Problem: Patient Care Overview  Goal: Plan of Care Review  Outcome: Ongoing (interventions implemented as appropriate)  Pt off levophed this afternoon.  Tolerating NC well.  Pain well controlled w pain meds.  Plan of care reviewed w pt and family.

## 2017-12-19 NOTE — ASSESSMENT & PLAN NOTE
Cont full vent support  Vent settings reviewed and adjusted  Wean FiO2  Attempt SBT later today  Dano SAT fully awake and responsive

## 2017-12-19 NOTE — PLAN OF CARE
Problem: Patient Care Overview  Goal: Plan of Care Review  Outcome: Ongoing (interventions implemented as appropriate)  Recommendation/Intervention: 1. If unable to extubate and advance diet within 5 days, consider TPN to meet needs until oral diet or enteral nutrition can be initiated. TPN Recommendations Clinimix E 5/15 at 40ml/hr x24 hours, increase to 75ml/hr goal rate with 250ml 20% lipids every 48 hours. (1528 calories, 90g protein, 1925ml fluid)   Goals: Nutrition support within 5 days if not on oral diet  Nutrition Goal Status: new  Communication of RD Recs: other (comment) (care plan, sticky note)

## 2017-12-19 NOTE — PLAN OF CARE
Problem: Patient Care Overview  Goal: Plan of Care Review  Outcome: Ongoing (interventions implemented as appropriate)  Patient continued with low bp over night , be continues  levophed drip that is being titrated for sbp>90mmhg.Respratory therapist at bedside attempting to insert an ADAM AT TIME OF REPORTING. HR- IN 1--- 130BPM @ TIME OF REPORTING , PATIENT REMAINS on diltiazem drip at time of reporting that is being titrated as per md orders.colostomy draining dark brown drainage.Is being observed

## 2017-12-19 NOTE — PT/OT/SLP PROGRESS
Physical Therapy      Patient Name:  Herb Sumner   MRN:  [id number]    ALEXANDRA RICHEY INITIATED THIS AM VIA CHART REVIEW, PT CURRENTLY INTUBATED, WILL ASSESS PT NEXT VISIT IF MEDICALLY AVAILABLE    Shirin Barnhart, PT   12/19/2017  1122

## 2017-12-19 NOTE — HOSPITAL COURSE
Admitted for the evaluation of acute abdomen and had been evaluated with CT scan and gastrograffin enema. It revealed colon cancer with obstruction and liver metastasis.     s/p transverse colectomy with creation of colostomy. Biopsy of liver lesion. Pt now intubated in icu. In sinus rhythm.

## 2017-12-19 NOTE — HOSPITAL COURSE
"12/19 Patient s/p colectomy. Remains intubated. Patient responsive to command can MEYER. Patient denies significant discomfort. ART line inserted. Case discussed with Surgery / Pulmonary CC    12/20 - Patient extubated / communicative. Patient denies complaint. Abdomen remains distended with hypoactive BS. Case discussed with Surgery. Initiate TPN and await return of bowel function. Heme Onc has been consulted for Obstructing Colon mass with apparent liver mets. Bx result pending.    12/21: Patient started on clear liquid diet at noon today. He ate broth and jello. Denies N/V and abd pain. Stoma pink with colostomy bag in place with moderate amount of brown stool present. Adb remains distended with hypoactive Bs. Daughter at bedside, reporting they have been accepted at [facility] as soon as pt is discharged. Discussed plan with ROBE Pandya from General Surgery.    12/23/17: Tolerating clear liquids. Stoma pink with colostomy bag in place, liquid brown stool. Abd distention with hypoactive bowel sounds x all quads. Discharge planning with home health    12/24/17: Tolerating clear liquids. Stoma pink with colostomy bag in place, liquid brown stool. Abd distention with hypoactive bowel sounds x all quads. Discussed discharge planning with daughter and patient, daughter reports she and patient has been performing colostomy care, all colostomy supplies are "at home". Will follow-up with staging PET at [facility]. Patient states "I feel better, I'm ready to go home today". Patient seen and examined today and was determined stable for discharge.      "

## 2017-12-19 NOTE — PROGRESS NOTES
The patient was seen and examined. Doing well and conversing. Vital signs stable, and still distended abdomen.  Continue with current plan.  Is expected to start po in a few days after the ileus has resolved.  Recommend TPN to start tomorrow.    David Ballard

## 2017-12-19 NOTE — EICU
eICU Note :    Called by the [facility] eRN:    Problem:S/p Colon resection with Colostomy and Liver Bx, went into Af[facility] I ED on Cardizem GTT     Pertinent History and labs reviewed :77-year-old male with 2 week history of worsening abdominal distention, belching, nausea, vomiting was found to have a small bowel obstruction. Patient underwent a colon resection for CA colon and colostomy with liver biopsy.  Patient on mechanical ventilator postoperatively      Treatment /Intervention given:Orders for sedation and mechanical ventilation placed        Elsa Shawn CARRENO  eICU Physician  3:05 AM  BP in the 70' s, pt received 1.0L bolus , will give another 500cc bolus.Total fluid received 1.2l  5:11 AM Start pressors, increasing Normal saline to 1 50 cc an hour  5:20 AM:ABG 7.47/28/52/20/89.  This is controlled for 20, PEEP of 5,O2 40%  6:42 AM  Give another bolus 500 + Added Meropenem 1gm  for broader coverage, check Cortisol level STAT

## 2017-12-19 NOTE — OP NOTE
"Operative Note       SURGERY DATE:  12/18/2017    PRE-OP DIAGNOSIS:  Acute abdomen [R10.0]    POST-OP DIAGNOSIS:  Acute abdomen [R10.0]   Colon cancer of the descending colon with obstruction  Ischemic small bowel    Active Hospital Problems    Diagnosis  POA    *Bowel obstruction [K56.609]  Yes    Hyponatremia [E87.1]  Yes    Liver masses [R16.0]  Yes      Resolved Hospital Problems    Diagnosis Date Resolved POA   No resolved problems to display.       Procedure(s) (LRB):  LAPAROTOMY-EXPLORATORY (N/A)  COLOSTOMY (Left) with segmental resection.    Liver biopsy, left lobe      Surgeon(s) and Role:     * David Ballard MD - Primary     * Angelo Smyth MD - Assisting    ASSISTANTS: None  ANESTHESIA: Choice    FINDINGS: Upon entering the abdominal cavity, severely dilated small bowel was found to be ischemic due to pressure/distension. The entire colon was severely distended, and the malignant stricture was noted at the distal end of descending colon. Liver metastatic lesions were noted and excisional biopsy done.     ESTIMATED BLOOD LOSS: 100 mL              COMPLICATIONS:  None    SPECIMEN:  segment of descending colon and liver biopsy from the left lobe.    Implants: None    INDICATION:  Acute colonic obstruction.    DESCRIPTION OF PROCEDURE:    The patient was taken to the operating room and under went general anesthesia with orotracheal intubation. Once the patient was sleep, the patient was prepped and draped in the sterile manner. Once the patient was sleep, a "time-out" was called, the patient's ID, the site of surgery, the names of participants, and the planned surgery were confirmed prior to making the incision. A midline incision was made to  obtain access into the peritoneal cavity. Further dissection revealed the above findings. Mobilization of the sigmoid medially by taking down inflammatory adhesion along the left paracolic gutter was completed which was also extended into the pelvis. A small " mesenteric window was then created, and MISSY stapler was inserted and divided the colon distally. Then this repeated above the lesion at the descending colon proximally. Then the mesentery was divided until adequate length of descending colon was achieved to deliver through the colostomy opening. Then after complete decompression of the small bowel into stomach, NG tube decompression was done. Liver biopsy of the lesion on the left lobe was excised from the left lobe with a cautery. After irrigation, the abdomen was closed with two looped #1 blunt tipped PDS. The skin closed with a skin stapler. Then the colostomy was matured in the usual fashion. The instrument and lap pad counts were correct. The patient was stable but still critically ill with rapid heart rate from atrial fibrillation. The patient was kept intubated and later taken to the ICU.           CONDITION: Critical    DISPOSITION: ICU - extubated and stable.     David Ballard

## 2017-12-19 NOTE — SUBJECTIVE & OBJECTIVE
Interval History: s/p Colectomy Liver Biopsy    Review of Systems   Unable to perform ROS: Intubated     Objective:     Vital Signs (Most Recent):  Temp: 99.2 °F (37.3 °C) (12/19/17 0545)  Pulse: 96 (12/19/17 0630)  Resp: (!) 26 (12/19/17 0630)  BP: 93/73 (12/19/17 0630)  SpO2: (!) 94 % (12/19/17 0630) Vital Signs (24h Range):  Temp:  [98.2 °F (36.8 °C)-99.8 °F (37.7 °C)] 99.2 °F (37.3 °C)  Pulse:  [] 96  Resp:  [17-34] 26  SpO2:  [91 %-99 %] 94 %  BP: ()/() 93/73     Weight: 74.4 kg (164 lb 0.4 oz)  Body mass index is 27.29 kg/m².    Intake/Output Summary (Last 24 hours) at 12/19/17 0828  Last data filed at 12/19/17 0615   Gross per 24 hour   Intake           980.19 ml   Output              565 ml   Net           415.19 ml      Physical Exam   Constitutional: He is oriented to person, place, and time. He appears well-developed and well-nourished. No distress.   HENT:   Head: Normocephalic and atraumatic.   Eyes: EOM are normal. Pupils are equal, round, and reactive to light.   Neck: Normal range of motion. Neck supple. No JVD present.   Cardiovascular: Intact distal pulses.    Murmur heard.  Tachy  IRR/IRR  2/6 leon   Pulmonary/Chest: No stridor. No respiratory distress. He has wheezes.   Intubated  Coarse BS   Abdominal: He exhibits distension. There is tenderness.   Hypoactive  Colostomy in place   Musculoskeletal: Normal range of motion. He exhibits no edema or deformity.   Neurological: He is alert and oriented to person, place, and time. He has normal reflexes.   Skin: Skin is warm and dry. Capillary refill takes less than 2 seconds. He is not diaphoretic.   Psychiatric: He has a normal mood and affect. His behavior is normal. Thought content normal.   Nursing note and vitals reviewed.      Significant Labs:   BMP:   Recent Labs  Lab 12/18/17  0900 12/19/17  0352   * 208*  208*   * 134*  134*   K 4.0 2.9*  2.9*   CL 89* 107  107   CO2 23 16*  16*   BUN 26* 19  19    CREATININE 0.8 0.7  0.7   CALCIUM 9.3 5.7*  5.7*   MG 1.8  --      CBC:   Recent Labs  Lab 12/18/17  0900 12/19/17  0352   WBC 5.38 7.45  7.45   HGB 14.8 13.8*  13.8*   HCT 42.2 39.3*  39.3*    237  237     CMP:   Recent Labs  Lab 12/18/17  0900 12/19/17  0352   * 134*  134*   K 4.0 2.9*  2.9*   CL 89* 107  107   CO2 23 16*  16*   * 208*  208*   BUN 26* 19  19   CREATININE 0.8 0.7  0.7   CALCIUM 9.3 5.7*  5.7*   PROT 6.7  --    ALBUMIN 3.1*  --    BILITOT 1.0  --    ALKPHOS 80  --    AST 28  --    ALT 18  --    ANIONGAP 13 11  11   EGFRNONAA >60 >60  >60     Urine Studies:   Recent Labs  Lab 12/18/17  1015   COLORU Yellow   APPEARANCEUA Clear   PHUR 6.0   SPECGRAV 1.025   PROTEINUA 1+*   GLUCUA Negative   KETONESU 1+*   BILIRUBINUA 1+*   OCCULTUA Negative   NITRITE Negative   UROBILINOGEN 2.0-3.0*   LEUKOCYTESUR Negative   RBCUA 2   WBCUA 5   BACTERIA Few*   HYALINECASTS 0     All pertinent labs within the past 24 hours have been reviewed.    Significant Imaging: I have reviewed all pertinent imaging results/findings within the past 24 hours.

## 2017-12-19 NOTE — ANESTHESIA PREPROCEDURE EVALUATION
12/18/2017  Herb Sumner is a 77 y.o., male.    Pre-op Assessment    I have reviewed the Patient Summary Reports.     I have reviewed the Nursing Notes.   I have reviewed the Medications.     Review of Systems  Anesthesia Hx:  No problems with previous Anesthesia    Social:  Former Smoker, Social Alcohol Use    Hematology/Oncology:        Hematology Comments: Severe hyponatremia- Na+ 125 Current/Recent Cancer. 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    Cardiovascular:   hyperlipidemia    Hepatic/GI:   Liver Disease, Bowel obstruction  Liver masses          Anesthesia Plan  Type of Anesthesia, risks & benefits discussed:  Anesthesia Type:  general  Patient's Preference:   Intra-op Monitoring Plan: standard ASA monitors  Intra-op Monitoring Plan Comments: +/- arterial line/central line if needed  Post Op Pain Control Plan: per primary service following discharge from PACU, IV/PO Opioids PRN and multimodal analgesia  Post Op Pain Control Plan Comments:   Induction:   IV  Beta Blocker:  Patient is on a Beta-Blocker and has received one dose within the past 24 hours (No further documentation required).     Beta Blocker Comments: Not on home beta blocker but administered in ER  Informed Consent:    ASA Score: 3  emergent   Day of Surgery Review of History & Physical:    H&P update referred to the surgeon.     Anesthesia Plan Notes: WBC 5.38, Hgb 14.8, Hct 42.2, Plt 334, K 4.0, Cr 0.8, INR 1.2  Na 125 - severe hyponatremia  2 PIV, possible need for central line

## 2017-12-19 NOTE — ASSESSMENT & PLAN NOTE
-Possibly represent metastasis associated with obstructing mass.   -Further work up will be planned as outpatient, if needed.     12/19 - Bx pending

## 2017-12-19 NOTE — ED NOTES
Informed hospital medicine about patient rhythm and HR at this time. Verbalized she is putting STAT EKG and lopressor.

## 2017-12-19 NOTE — CONSULTS
[facility] -   Adult Nutrition  Consult Note    SUMMARY     Recommendations    Recommendation/Intervention: 1. If unable to extubate and advance diet within 5 days, consider TPN to meet needs until oral diet or enteral nutrition can be initiated. TPN Recommendations Clinimix E 5/15 at 40ml/hr x24 hours, increase to 75ml/hr goal rate with 250ml 20% lipids every 48 hours. (1528 calories, 90g protein, 1925ml fluid)   Goals: Nutrition support within 5 days if not on oral diet  Nutrition Goal Status: new  Communication of RD Recs: other (comment) (care plan, sticky note)      Reason for Assessment    Reason for Assessment: nurse/nurse practitioner consult (intubated)   Current Diagnosis:  1. Abdominal pain, unspecified abdominal location    2. Nausea    3. Liver lesion    4. Partial small bowel obstruction    5. Atrial fibrillation    6. Malignant neoplasm of descending colon    7. Bowel obstruction    8. Hyponatremia    9. Liver masses    10. Intestinal obstruction, unspecified cause, unspecified whether partial or complete    11. Acute hypoxemic respiratory failure    12. Atrial fibrillation with rapid ventricular response    13. Hyperglycemia, unspecified    14. Hypokalemia    15. S/P colostomy    16. Shock    17. Small bowel ischemia      Past Medical History:   Diagnosis Date    EtOH dependence     Hyperlipemia     Hyperlipidemia                  General Information Comments: Patient is intubated with NG tube, currently NPO due to possible obstruction    Nutrition Discharge Planning: too soon to determine    Nutrition Prescription Ordered    Current Diet Order: NPO                    Evaluation of Received Nutrients/Fluid Intake                                                                                                     % Intake of Estimated Energy Needs: 0 - 25 %  % Meal Intake: NPO     Nutrition Risk Screen     Nutrition Risk Screen: no indicators present (intubated)    Nutrition/Diet  "History       Typical Food/Fluid Intake: unable to obtain  Food Preferences: unable to obtain                         Labs/Tests/Procedures/Meds       Pertinent Labs Reviewed: reviewed      BMP  Lab Results   Component Value Date     (L) 12/19/2017     (L) 12/19/2017    K 2.9 (L) 12/19/2017    K 2.9 (L) 12/19/2017     12/19/2017     12/19/2017    CO2 16 (L) 12/19/2017    CO2 16 (L) 12/19/2017    BUN 19 12/19/2017    BUN 19 12/19/2017    CREATININE 0.7 12/19/2017    CREATININE 0.7 12/19/2017    CALCIUM 5.7 (LL) 12/19/2017    CALCIUM 5.7 (LL) 12/19/2017    ANIONGAP 11 12/19/2017    ANIONGAP 11 12/19/2017    ESTGFRAFRICA >60 12/19/2017    ESTGFRAFRICA >60 12/19/2017    EGFRNONAA >60 12/19/2017    EGFRNONAA >60 12/19/2017     Lab Results   Component Value Date    ALBUMIN 1.9 (L) 12/19/2017     Lab Results   Component Value Date    CALCIUM 5.7 (LL) 12/19/2017    CALCIUM 5.7 (LL) 12/19/2017       Recent Labs  Lab 12/19/17  1234   POCTGLUCOSE 174*       Pertinent Medications Reviewed: reviewed       Physical Findings    Overall Physical Appearance: on ventilator support  Tubes: nasogastric tube  Oral/Mouth Cavity: WDL  Skin: incision    Anthropometrics    Temp: 100.2 °F (37.9 °C)     Height: 5' 5" (165.1 cm)  Weight Method: Standard Scale  Weight: 74.4 kg (164 lb 0.4 oz)  Ideal Body Weight (IBW), Male: 136 lb     % Ideal Body Weight, Male (lb): 120.6 lb     BMI (Calculated): 27.4  BMI Grade: 25 - 29.9 - overweight                            Estimated/Assessed Needs    Weight Used For Calorie Calculations: 74.4 kg (164 lb 0.4 oz)      Energy Calorie Requirements (kcal): 1474  Energy Need Method: [facility]        Weight Used For Protein Calculations: 74.4 kg (164 lb 0.4 oz)     1.2 gm Protein (gm): 89.47 and 1.5 gm Protein (gm): 111.83  Fluid Requirements (mL): 2316           RDA Method (mL): 1474               Assessment and Plan    * Bowel obstruction    Nutrition Diagnosis:  Inadequate energy " intake    Related to (etiology):   Altered GI tract function    Signs and Symptoms (as evidenced by):   Bowel obstruction     Interventions/Recommendations (treatment strategy):  1. Consider IV nutrition support, see consult note for full recommendations    Nutrition Diagnosis Status:   New              Monitor and Evaluation    Food and Nutrient Intake: energy intake  Food and Nutrient Adminstration: enteral and parenteral nutrition administration        Anthropometric Measurements: weight  Biochemical Data, Medical Tests and Procedures: electrolyte and renal panel, glucose/endocrine profile  Nutrition-Focused Physical Findings: skin      Nutrition Follow-Up    RD Follow-up?: Yes (2x weekly)

## 2017-12-19 NOTE — SUBJECTIVE & OBJECTIVE
"Past Medical History:   Diagnosis Date    EtOH dependence     Hyperlipemia     Hyperlipidemia        Past Surgical History:   Procedure Laterality Date    BACK SURGERY         Review of patient's allergies indicates:  No Known Allergies    Family History     Problem Relation (Age of Onset)    Stroke Mother        Social History Main Topics    Smoking status: Former Smoker     Packs/day: 2.00     Years: 20.00    Smokeless tobacco: Not on file      Comment: quit 40 years ago    Alcohol use 4.2 oz/week     7 Glasses of wine per week      Comment: whiskey and scotch 2 "jiggers" nightly    Drug use: No    Sexual activity: Not on file         Review of Systems   Unable to perform ROS: Intubated     Objective:     Vital Signs (Most Recent):  Temp: 99.2 °F (37.3 °C) (12/19/17 0545)  Pulse: 104 (12/19/17 0840)  Resp: (!) 24 (12/19/17 0840)  BP: 93/73 (12/19/17 0630)  SpO2: (!) 93 % (12/19/17 0840) Vital Signs (24h Range):  Temp:  [98.2 °F (36.8 °C)-99.8 °F (37.7 °C)] 99.2 °F (37.3 °C)  Pulse:  [] 104  Resp:  [17-34] 24  SpO2:  [91 %-99 %] 93 %  BP: ()/() 93/73     Weight: 74.4 kg (164 lb 0.4 oz)  Body mass index is 27.29 kg/m².      Intake/Output Summary (Last 24 hours) at 12/19/17 0853  Last data filed at 12/19/17 0615   Gross per 24 hour   Intake           980.19 ml   Output              565 ml   Net           415.19 ml       Physical Exam   Constitutional: He appears well-developed and well-nourished. He is cooperative.  Non-toxic appearance. He has a sickly appearance. He appears ill. No distress. He is sedated, intubated and restrained.   HENT:   Head: Normocephalic and atraumatic.   Mouth/Throat: Oropharynx is clear and moist and mucous membranes are normal.   Eyes: EOM and lids are normal. Pupils are equal, round, and reactive to light.   Neck: Trachea normal and normal range of motion. Carotid bruit is not present.       Cardiovascular: Normal heart sounds.  An irregular rhythm present. " Tachycardia present.    Pulses:       Radial pulses are 1+ on the right side, and 1+ on the left side.        Dorsalis pedis pulses are 0 on the right side, and 0 on the left side.   bilat feet warm and no cyanosis   Pulmonary/Chest: Effort normal. No accessory muscle usage. He is intubated. No respiratory distress. He has decreased breath sounds. He has rales.   Abdominal: He exhibits no distension. Bowel sounds are absent. There is generalized tenderness.       Genitourinary: Penis normal.   Genitourinary Comments: Briggs in place   Musculoskeletal: Normal range of motion.        Right foot: There is no deformity.        Left foot: There is no deformity.   No edema   Lymphadenopathy:     He has no cervical adenopathy.   Neurological: He is alert.   Fully awake and alert and responsive following all commands   Skin: Skin is warm and dry. Capillary refill takes less than 2 seconds. No rash noted. No cyanosis.        Psychiatric: He has a normal mood and affect. His behavior is normal.       Vents:  Vent Mode: Spont (12/19/17 0840)  Ventilator Initiated: Yes (12/18/17 2300)  Set Rate: 0 bmp (12/19/17 0840)  Vt Set: 420 mL (12/19/17 0840)  Pressure Support: 10 cmH20 (12/19/17 0840)  PEEP/CPAP: 10 cmH20 (12/19/17 0840)  Oxygen Concentration (%): 40 (12/19/17 0840)  Peak Airway Pressure: 21 cmH2O (12/19/17 0840)  Plateau Pressure: 0 cmH20 (12/19/17 0840)  Total Ve: 11.8 mL (12/19/17 0840)  F/VT Ratio<105 (RSBI): (!) 50.53 (12/19/17 0840)    Lines/Drains/Airways     Central Venous Catheter Line                 Percutaneous Central Line Insertion/Assessment - triple lumen  12/19/17 0740 left internal jugular less than 1 day          Drain                 Colostomy 12/18/17 2300 Descending/sigmoid LLQ less than 1 day         NG/OG Tube 12/18/17 1546 nasogastric 16 Fr. Right nostril less than 1 day         Urethral Catheter 12/18/17 2100 Latex;Straight-tip 16 Fr. less than 1 day          Airway                 Airway -  Non-Surgical 12/18/17 2000 Endotracheal Tube less than 1 day          Peripheral Intravenous Line                 Peripheral IV - Single Lumen 12/18/17 0900 Left Antecubital less than 1 day         Peripheral IV - Single Lumen 12/18/17 2300 Right Hand less than 1 day                Significant Labs:    CBC/Anemia Profile:    Recent Labs  Lab 12/18/17  0900 12/19/17  0352   WBC 5.38 7.45  7.45   HGB 14.8 13.8*  13.8*   HCT 42.2 39.3*  39.3*    237  237   MCV 83 85  85   RDW 13.0 13.6  13.6        Chemistries:    Recent Labs  Lab 12/18/17  0900 12/19/17  0352   * 134*  134*   K 4.0 2.9*  2.9*   CL 89* 107  107   CO2 23 16*  16*   BUN 26* 19  19   CREATININE 0.8 0.7  0.7   CALCIUM 9.3 5.7*  5.7*   ALBUMIN 3.1*  --    PROT 6.7  --    BILITOT 1.0  --    ALKPHOS 80  --    ALT 18  --    AST 28  --    MG 1.8  --        ABGs:   Recent Labs  Lab 12/19/17  0507   PH 7.472*   PCO2 28.0*   HCO3 20.4*   POCSATURATED 89*   BE -3     Coagulation:   Recent Labs  Lab 12/18/17  0900   INR 1.2   APTT 28.0     All pertinent labs within the past 24 hours have been reviewed.    Significant Imaging:   CXR: I have reviewed all pertinent results/findings within the past 24 hours and my personal findings are:  mild pulm edema

## 2017-12-19 NOTE — HPI
Mr Sumner is a 76 yo WM with a PMH of HLD and daily mild etoh consumption.  He presented to [facility] ED last night with complaints of 2 week hx of worsening constant abd pain, abd distention, N/V, loss of appetite, malaise and obstipation of mod to severe severity.  CT Abd in ED showed multiple liver lesions as well as distended small bowel and colon, Na 125.  Surgery consulted and patient taken to OR by Dr. Ballard finding severely dilated small bowel was found to be ischemic due to pressure/distension. The entire colon was severely distended, and the malignant stricture was noted at the distal end of descending colon. Liver metastatic lesions were noted and excisional biopsy done.  He also underwent Expl Lap with Colostomy.  He was admitted to ICU post op intubated on mech ventilation and hypotensive.  He was also in A-fib on Cardizem and Levophed infusions.

## 2017-12-19 NOTE — PROGRESS NOTES
[facility] -   General Surgery  Progress Note    Subjective:     History of Present Illness:  Mr. Sumner is 77 year-old male was admitted via ED for the management of acute abdomen with a history of obstipation for last several days. He was complaining of severe abdominal distension with pain. He also complained of nausea and vomited few times. He has not been eating for last 4 days and feels very weak. He denies of abdominal surgery. He is also very into the natural herbal medicine to treat his aliments.    Post-Op Info:  Procedure(s) (LRB):  LAPAROTOMY-EXPLORATORY (N/A)  COLOSTOMY (Left)  SIGMOIDECTOMY (N/A)  BIOPSY-LIVER (N/A)   1 Day Post-Op     Interval History: s/p transverse colectomy with creation of colostomy. Biopsy of liver lesion. Pt now intubated in icu. In sinus rhythm.     Medications:  Continuous Infusions:   sodium chloride 0.9% 150 mL/hr at 12/19/17 0520    fentanyl 50 mcg/hr (12/19/17 0809)    norepinephrine bitartrate-D5W 0.22 mcg/kg/min (12/19/17 0900)     Scheduled Meds:   calcium gluconate IVPB  1,000 mg Intravenous Once    chlorhexidine  15 mL Mouth/Throat BID    ciprofloxacin  400 mg Intravenous Q12H    digoxin  250 mcg Intravenous Once    enoxparin  40 mg Subcutaneous Q24H    famotidine (PF)  20 mg Intravenous BID    lactated ringers  500 mL Intravenous Once    meropenem (MERREM) IVPB  1 g Intravenous Q8H    metronidazole  500 mg Intravenous Q8H    nozaseptin   Each Nare BID    potassium chloride  40 mEq Intravenous Q4H    senna-docusate 8.6-50 mg  1 tablet Oral BID     PRN Meds:acetaminophen, bisacodyl, cloNIDine, dextrose 50%, diphenhydrAMINE, glucagon (human recombinant), hydrocodone-acetaminophen 5-325mg, insulin aspart, lactulose, metoprolol, morphine, ondansetron, ondansetron, promethazine, ramelteon     Review of patient's allergies indicates:  No Known Allergies  Objective:     Vital Signs (Most Recent):  Temp: 99.2 °F (37.3 °C) (12/19/17 0545)  Pulse:  104 (12/19/17 0840)  Resp: (!) 24 (12/19/17 0840)  BP: 93/73 (12/19/17 0630)  SpO2: (!) 93 % (12/19/17 0840) Vital Signs (24h Range):  Temp:  [98.2 °F (36.8 °C)-99.8 °F (37.7 °C)] 99.2 °F (37.3 °C)  Pulse:  [] 104  Resp:  [17-34] 24  SpO2:  [91 %-99 %] 93 %  BP: ()/() 93/73     Weight: 74.4 kg (164 lb 0.4 oz)  Body mass index is 27.29 kg/m².    Intake/Output - Last 3 Shifts       12/17 0700 - 12/18 0659 12/18 0700 - 12/19 0659 12/19 0700 - 12/20 0659    I.V. (mL/kg)  180.2 (2.4)     IV Piggyback  800     Total Intake(mL/kg)  980.2 (13.2)     Urine (mL/kg/hr)  215     Drains  350     Stool  0     Total Output   565      Net   +415.2             Urine Occurrence  300 x     Stool Occurrence  1 x           Physical Exam   Constitutional: He appears well-developed.   Acutely ill appearing   HENT:   Head: Normocephalic and atraumatic.   Eyes: EOM are normal.   Cardiovascular: Normal rate and regular rhythm.    Pulmonary/Chest: No respiratory distress.   Intubated, on vent   Abdominal: He exhibits distension.   Colostomy is red, mildly edematous, viable. Dark brown effluent.    NG with bilious output. .    Musculoskeletal: Normal range of motion.   Neurological:   resonsive, on vent   Skin: Skin is warm and dry.   Psychiatric: He has a normal mood and affect. Thought content normal.   Vitals reviewed.      Significant Labs:  CBC:   Recent Labs  Lab 12/19/17  0352   WBC 7.45  7.45   RBC 4.61  4.61   HGB 13.8*  13.8*   HCT 39.3*  39.3*     237   MCV 85  85   MCH 29.9  29.9   MCHC 35.1  35.1     CMP:   Recent Labs  Lab 12/18/17  0900 12/19/17  0352 12/19/17  0908   * 208*  208*  --    CALCIUM 9.3 5.7*  5.7*  --    ALBUMIN 3.1*  --  1.9*   PROT 6.7  --   --    * 134*  134*  --    K 4.0 2.9*  2.9*  --    CO2 23 16*  16*  --    CL 89* 107  107  --    BUN 26* 19  19  --    CREATININE 0.8 0.7  0.7  --    ALKPHOS 80  --   --    ALT 18  --   --    AST 28  --   --    BILITOT 1.0   --   --      ABGs:   Recent Labs  Lab 12/19/17  0507   PH 7.472*   PCO2 28.0*   PO2 52*   HCO3 20.4*   POCSATURATED 89*   BE -3       Significant Diagnostics:  I have reviewed all pertinent imaging results/findings within the past 24 hours.    Assessment/Plan:   S/P segmental resection of descending colon for colon cancer, POD#1  Atrial fibrillation  Continue with current plan.      Katelyn Kelly PA-C  General Surgery  [facility]

## 2017-12-19 NOTE — PROGRESS NOTES
7:00 pm Converted to Atrial fibrillation with 's. Treated in ER with 5 + 5 mg lopressor followed by IV Cardizem 20 mg + IV Cardizem drip. ECHO ordered in the am with ECG. Discussed with family, daughter, and wife. Dr. Ballard at bedside and plans surgery tonight for colon CA. Patient seen and examined.

## 2017-12-19 NOTE — HOSPITAL COURSE
12/19 - fully awake and responsive on mech ventilation on Levophed, Cardizem and Precedex infusions still hypotensive in A-Fib at 112 bpm.  12/20 - Dano extubation yesterday and weaned off pressor.  Up in chair today.  Fully awake and VSS.  Brown loose Stool in Colostomy

## 2017-12-19 NOTE — ASSESSMENT & PLAN NOTE
-Concerning for possible obstructing mass.   -Discussed plan of care with Dr. Ballard.   -Keep patient NPO for possible surgery.   -NG tube placement due to persistent vomiting.    12/19 - Patient s/p colectomy. Tolerated procedure awaiting pathology - Likely CA

## 2017-12-19 NOTE — ASSESSMENT & PLAN NOTE
Colon cancer with liver metastasis and obstruction. Will require emergent exploration with colostomy and decompression.

## 2017-12-19 NOTE — PROCEDURES
"Herb Sumner is a 77 y.o. male patient.    Temp: 99.2 °F (37.3 °C) (12/19/17 0545)  Pulse: 104 (12/19/17 0840)  Resp: (!) 24 (12/19/17 0840)  BP: 93/73 (12/19/17 0630)  SpO2: (!) 93 % (12/19/17 0840)  Weight: 74.4 kg (164 lb 0.4 oz) (12/18/17 0844)  Height: 5' 5" (165.1 cm) (12/18/17 0844)       Arterial Line  Date/Time: 12/19/2017 7:15 AM  Location procedure was performed: [facility] INTENSIVE CARE UNIT  Performed by: BRADY ARZATE  Authorized by: BRADY ARZATE   Pre-op Diagnosis: shock  Post-operative diagnosis: shock  Consent Done: Not Needed  Preparation: Patient was prepped and draped in the usual sterile fashion.  Indications: respiratory failure and hemodynamic monitoring  Location: right radial  Patient sedated: yes  Vitals: Vital signs were monitored during sedation.  Daniele's test normal: yes  Needle gauge: 20  Seldinger technique: Seldinger technique used  Number of attempts: 1  Complications: No  Estimated blood loss (mL): 2  Specimens: No  Implants: No  Post-procedure: line sutured and dressing applied  Post-procedure CMS: unchanged  Patient tolerance: Patient tolerated the procedure well with no immediate complications          Brady Arzate  12/19/2017  "

## 2017-12-19 NOTE — ASSESSMENT & PLAN NOTE
Start Dig and gave IV lopressor X 1 converted to NSR  Stop Cardizem infusion  ICU cardiac monitoring

## 2017-12-19 NOTE — CONSULTS
"[facility]  Cardiology  Consult Note    Patient Name: Herb Sumner  MRN: [id number]  Admission Date: 12/18/2017  Hospital Length of Stay: 1 days  Code Status: Full Code   Attending Provider: Anjel Strickland MD   Consulting Provider: Reese Lassiter MD  Primary Care Physician: Primary Doctor No  Principal Problem:Bowel obstruction    Patient information was obtained from patient and ER records.     Consults afib  Subjective:     Chief Complaint:  Small bowel obstruction    HPI: 78 yo male, came in for SBO. Had emergent surgery last night, finding colon CA, colon resection, colostomy with metastatic liver biopsy.  Now intubated.  His first EKG in ER was on sinus and then developed Afib with RVR. Overnight, had digoxin ivp and cardizem IVP and gtt at 5 mg. Now sinus rhythm at 70's.      Past Medical History:   Diagnosis Date    EtOH dependence     Hyperlipemia     Hyperlipidemia        Past Surgical History:   Procedure Laterality Date    BACK SURGERY         Review of patient's allergies indicates:  No Known Allergies    No current facility-administered medications on file prior to encounter.      No current outpatient prescriptions on file prior to encounter.     Family History     Problem Relation (Age of Onset)    Stroke Mother        Social History Main Topics    Smoking status: Former Smoker     Packs/day: 2.00     Years: 20.00    Smokeless tobacco: Not on file      Comment: quit 40 years ago    Alcohol use 4.2 oz/week     7 Glasses of wine per week      Comment: whiskey and scotch 2 "jiggers" nightly    Drug use: No    Sexual activity: Not on file     Review of Systems   Unable to perform ROS: intubated     Objective:     Vital Signs (Most Recent):  Temp: 99.2 °F (37.3 °C) (12/19/17 0545)  Pulse: 104 (12/19/17 0840)  Resp: (!) 24 (12/19/17 0840)  BP: 93/73 (12/19/17 0630)  SpO2: (!) 93 % (12/19/17 0840) Vital Signs (24h Range):  Temp:  [98.2 °F (36.8 °C)-99.8 °F (37.7 °C)] 99.2 °F (37.3 " °C)  Pulse:  [] 104  Resp:  [17-34] 24  SpO2:  [91 %-99 %] 93 %  BP: ()/() 93/73     Weight: 74.4 kg (164 lb 0.4 oz)  Body mass index is 27.29 kg/m².    SpO2: (!) 93 %  O2 Device (Oxygen Therapy): ventilator      Intake/Output Summary (Last 24 hours) at 12/19/17 0925  Last data filed at 12/19/17 0615   Gross per 24 hour   Intake           980.19 ml   Output              565 ml   Net           415.19 ml       Lines/Drains/Airways     Central Venous Catheter Line                 Percutaneous Central Line Insertion/Assessment - triple lumen  12/19/17 0740 left internal jugular less than 1 day          Drain                 Colostomy 12/18/17 2300 Descending/sigmoid LLQ less than 1 day         NG/OG Tube 12/18/17 1546 nasogastric 16 Fr. Right nostril less than 1 day         Urethral Catheter 12/18/17 2100 Latex;Straight-tip 16 Fr. less than 1 day          Airway                 Airway - Non-Surgical 12/18/17 2000 Endotracheal Tube less than 1 day          Peripheral Intravenous Line                 Peripheral IV - Single Lumen 12/18/17 0900 Left Antecubital 1 day         Peripheral IV - Single Lumen 12/18/17 2300 Right Hand less than 1 day                Physical Exam   Constitutional: He is oriented to person, place, and time. He appears well-nourished.   intubated   HENT:   Head: Normocephalic.   Neck: Normal carotid pulses and no JVD present. Carotid bruit is not present. No thyromegaly present.   Cardiovascular: Normal rate, regular rhythm and normal pulses.   No extrasystoles are present. PMI is not displaced.  Exam reveals no gallop and no S3.    No murmur heard.  Hard to appreciated heat sound due to on th event   Pulmonary/Chest: Breath sounds normal. No stridor. No respiratory distress.   B/l vent air flow   Abdominal: Bowel sounds are normal. There is no tenderness. There is no rebound.   Distended  Decreased bowel sound   Musculoskeletal: Normal range of motion.   Neurological: He is alert  and oriented to person, place, and time.   Skin: Skin is intact. No rash noted.   Psychiatric: His behavior is normal.       Significant Labs:   ABG:   Recent Labs  Lab 12/19/17  0507   PH 7.472*   PCO2 28.0*   HCO3 20.4*   POCSATURATED 89*   BE -3   , Blood Culture: No results for input(s): LABBLOO in the last 48 hours., BMP:   Recent Labs  Lab 12/18/17  0900 12/19/17  0352   * 208*  208*   * 134*  134*   K 4.0 2.9*  2.9*   CL 89* 107  107   CO2 23 16*  16*   BUN 26* 19  19   CREATININE 0.8 0.7  0.7   CALCIUM 9.3 5.7*  5.7*   MG 1.8  --    , CMP   Recent Labs  Lab 12/18/17  0900 12/19/17  0352   * 134*  134*   K 4.0 2.9*  2.9*   CL 89* 107  107   CO2 23 16*  16*   * 208*  208*   BUN 26* 19  19   CREATININE 0.8 0.7  0.7   CALCIUM 9.3 5.7*  5.7*   PROT 6.7  --    ALBUMIN 3.1*  --    BILITOT 1.0  --    ALKPHOS 80  --    AST 28  --    ALT 18  --    ANIONGAP 13 11  11   ESTGFRAFRICA >60 >60  >60   EGFRNONAA >60 >60  >60   , CBC   Recent Labs  Lab 12/18/17  0900 12/19/17  0352   WBC 5.38 7.45  7.45   HGB 14.8 13.8*  13.8*   HCT 42.2 39.3*  39.3*    237  237   , INR   Recent Labs  Lab 12/18/17  0900   INR 1.2   , Lipid Panel No results for input(s): CHOL, HDL, LDLCALC, TRIG, CHOLHDL in the last 48 hours. and Troponin   Recent Labs  Lab 12/18/17  0900   TROPONINI 0.012       Significant Imaging: X-Ray: CXR: X-Ray Chest 1 View (CXR): No results found for this visit on 12/18/17.  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 S/p resection of colon ,colostomy, liver biposy,.  HTN  S/p intubated    Plan:  Ok to continue Cardizem gtt at 5mg  NO need for anticoagulation if no recurrent Afib.  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 I will follow-up with patient. Please contact us if you have any additional questions.    Reese Lassiter MD  Cardiology   [facility]

## 2017-12-19 NOTE — HPI
Mr. Sumner is 77 year-old male was admitted via ED for the management of acute abdomen with a history of obstipation for last several days. He was complaining of severe abdominal distension with pain. He also complained of nausea and vomited few times. He has not been eating for last 4 days and feels very weak. He denies of abdominal surgery. He is also very into the natural herbal medicine to treat his aliments.

## 2017-12-19 NOTE — PROGRESS NOTES
[facility] Hospital Medicine  Progress Note    Patient Name: Herb Sumner  MRN: [id number]  Patient Class: IP- Inpatient   Admission Date: 12/18/2017  Length of Stay: 1 days  Attending Physician: Anjel Strickland MD  Primary Care Provider: Primary Doctor No        Subjective:     Principal Problem:Bowel obstruction    HPI:  Herb Sumner is a 77 year old male who presented to [facility] with reports of a 2 weeks history of worsening abdominal distention, belching, nausea and vomiting. The patient reports that his abdominal distention was initially mild and progressed over the last two weeks. He had some intermittent relief with vegetable laxatives, but symptoms returned. Today, the patient came to the ED because he could not tolerate even water without vomiting. There is associated frequent belching and hiccups. He reports that his intake has gradually decreased since the onset of symptoms. His last bowel movement was 5 days ago and he last passed gas one day ago. In the ED CT scan of the abdomen showed multiple liver lesions as well as distended small bowel and colon. The patient was also found to have hyponatremia with a sodium of 125. Code status was discussed with the patient who is a full code. The patient is unsure of if or when he last had a colonoscopy. His daughter, Kimberly Sumner is his surrogate medical decision maker.     Hospital Course:  12/19 Patient s/p colectomy. Remains intubated. Patient responsive to command can MEYER. Patient denies significant discomfort. ART line inserted. Case discussed with Surgery / Pulmonary CC    Interval History: s/p Colectomy Liver Biopsy    Review of Systems   Unable to perform ROS: Intubated     Objective:     Vital Signs (Most Recent):  Temp: 99.2 °F (37.3 °C) (12/19/17 0545)  Pulse: 96 (12/19/17 0630)  Resp: (!) 26 (12/19/17 0630)  BP: 93/73 (12/19/17 0630)  SpO2: (!) 94 % (12/19/17 0630) Vital Signs (24h Range):  Temp:  [98.2 °F (36.8  °C)-99.8 °F (37.7 °C)] 99.2 °F (37.3 °C)  Pulse:  [] 96  Resp:  [17-34] 26  SpO2:  [91 %-99 %] 94 %  BP: ()/() 93/73     Weight: 74.4 kg (164 lb 0.4 oz)  Body mass index is 27.29 kg/m².    Intake/Output Summary (Last 24 hours) at 12/19/17 0887  Last data filed at 12/19/17 0615   Gross per 24 hour   Intake           980.19 ml   Output              565 ml   Net           415.19 ml      Physical Exam   Constitutional: He is oriented to person, place, and time. He appears well-developed and well-nourished. No distress.   HENT:   Head: Normocephalic and atraumatic.   Eyes: EOM are normal. Pupils are equal, round, and reactive to light.   Neck: Normal range of motion. Neck supple. No JVD present.   Cardiovascular: Intact distal pulses.    Murmur heard.  Tachy  IRR/IRR  2/6 leon   Pulmonary/Chest: No stridor. No respiratory distress. He has wheezes.   Intubated  Coarse BS   Abdominal: He exhibits distension. There is tenderness.   Hypoactive  Colostomy in place   Musculoskeletal: Normal range of motion. He exhibits no edema or deformity.   Neurological: He is alert and oriented to person, place, and time. He has normal reflexes.   Skin: Skin is warm and dry. Capillary refill takes less than 2 seconds. He is not diaphoretic.   Psychiatric: He has a normal mood and affect. His behavior is normal. Thought content normal.   Nursing note and vitals reviewed.      Significant Labs:   BMP:   Recent Labs  Lab 12/18/17  0900 12/19/17  0352   * 208*  208*   * 134*  134*   K 4.0 2.9*  2.9*   CL 89* 107  107   CO2 23 16*  16*   BUN 26* 19  19   CREATININE 0.8 0.7  0.7   CALCIUM 9.3 5.7*  5.7*   MG 1.8  --      CBC:   Recent Labs  Lab 12/18/17  0900 12/19/17  0352   WBC 5.38 7.45  7.45   HGB 14.8 13.8*  13.8*   HCT 42.2 39.3*  39.3*    237  237     CMP:   Recent Labs  Lab 12/18/17  0900 12/19/17  0352   * 134*  134*   K 4.0 2.9*  2.9*   CL 89* 107  107   CO2 23 16*  16*   GLU  184* 208*  208*   BUN 26* 19  19   CREATININE 0.8 0.7  0.7   CALCIUM 9.3 5.7*  5.7*   PROT 6.7  --    ALBUMIN 3.1*  --    BILITOT 1.0  --    ALKPHOS 80  --    AST 28  --    ALT 18  --    ANIONGAP 13 11  11   EGFRNONAA >60 >60  >60     Urine Studies:   Recent Labs  Lab 12/18/17  1015   COLORU Yellow   APPEARANCEUA Clear   PHUR 6.0   SPECGRAV 1.025   PROTEINUA 1+*   GLUCUA Negative   KETONESU 1+*   BILIRUBINUA 1+*   OCCULTUA Negative   NITRITE Negative   UROBILINOGEN 2.0-3.0*   LEUKOCYTESUR Negative   RBCUA 2   WBCUA 5   BACTERIA Few*   HYALINECASTS 0     All pertinent labs within the past 24 hours have been reviewed.    Significant Imaging: I have reviewed all pertinent imaging results/findings within the past 24 hours.    Assessment/Plan:      * Bowel obstruction    -Concerning for possible obstructing mass.   -Discussed plan of care with Dr. Ballard.   -Keep patient NPO for possible surgery.   -NG tube placement due to persistent vomiting.    12/19 - Patient s/p colectomy. Tolerated procedure awaiting pathology - Likely CA          S/P colostomy    Surgery on Consult          Small bowel ischemia    S/p rsx POD#1          Shock    Pressors  ABX  Monitor          Acute hypoxemic respiratory failure    12/19 - Intubated. Mild sedation. Weaning as tolerated          Malignant neoplasm of descending colon    12/19 - Awaiting pathology. Discussed with Family. Plan for Heme Onc          Liver masses    -Possibly represent metastasis associated with obstructing mass.   -Further work up will be planned as outpatient, if needed.     12/19 - Bx pending        Hypokalemia    -IV NS.   -Monitor.             VTE Risk Mitigation         Ordered     enoxaparin injection 40 mg  Every 24 hours (non-standard times)     Route:  Subcutaneous        12/18/17 2325     Reason for No Pharmacological VTE Prophylaxis  Once      12/18/17 2325     Medium Risk of VTE  Once      12/18/17 2325     Place sequential compression device  Until  discontinued      12/18/17 2325     Place sequential compression device  Until discontinued      12/18/17 1620          Critical care time spent on the evaluation and treatment of severe organ dysfunction, review of pertinent labs and imaging studies, discussions with consulting providers and discussions with patient/family: 30 minutes.    Anjel Strickland MD  Department of Hospital Medicine   [facility]

## 2017-12-19 NOTE — ASSESSMENT & PLAN NOTE
S/p transverse colon resection and colostomy formation.   POD1  Continue supportive care in icu.   Continue NG tube  Monitor colostomy  Wound care consult  Dvt/gi prophylaxis

## 2017-12-20 PROBLEM — R57.9 SHOCK: Status: RESOLVED | Noted: 2017-12-19 | Resolved: 2017-12-20

## 2017-12-20 PROBLEM — J96.01 ACUTE HYPOXEMIC RESPIRATORY FAILURE: Status: RESOLVED | Noted: 2017-12-19 | Resolved: 2017-12-20

## 2017-12-20 PROBLEM — E87.6 HYPOKALEMIA: Status: RESOLVED | Noted: 2017-12-18 | Resolved: 2017-12-20

## 2017-12-20 LAB
ANION GAP SERPL CALC-SCNC: 9 MMOL/L
BASOPHILS # BLD AUTO: 0.01 K/UL
BASOPHILS NFR BLD: 0.1 %
BUN SERPL-MCNC: 20 MG/DL
CALCIUM SERPL-MCNC: 8.2 MG/DL
CHLORIDE SERPL-SCNC: 103 MMOL/L
CO2 SERPL-SCNC: 23 MMOL/L
CREAT SERPL-MCNC: 0.7 MG/DL
DIFFERENTIAL METHOD: ABNORMAL
EOSINOPHIL # BLD AUTO: 0 K/UL
EOSINOPHIL NFR BLD: 0.1 %
ERYTHROCYTE [DISTWIDTH] IN BLOOD BY AUTOMATED COUNT: 14 %
EST. GFR  (AFRICAN AMERICAN): >60 ML/MIN/1.73 M^2
EST. GFR  (NON AFRICAN AMERICAN): >60 ML/MIN/1.73 M^2
GLUCOSE SERPL-MCNC: 117 MG/DL
HCT VFR BLD AUTO: 35.5 %
HGB BLD-MCNC: 12.1 G/DL
LYMPHOCYTES # BLD AUTO: 1 K/UL
LYMPHOCYTES NFR BLD: 7.8 %
MAGNESIUM SERPL-MCNC: 2.1 MG/DL
MCH RBC QN AUTO: 29.2 PG
MCHC RBC AUTO-ENTMCNC: 34.1 G/DL
MCV RBC AUTO: 86 FL
MONOCYTES # BLD AUTO: 2 K/UL
MONOCYTES NFR BLD: 15.1 %
NEUTROPHILS # BLD AUTO: 10.1 K/UL
NEUTROPHILS NFR BLD: 76.9 %
PLATELET # BLD AUTO: 245 K/UL
PMV BLD AUTO: 9.7 FL
POCT GLUCOSE: 113 MG/DL (ref 70–110)
POCT GLUCOSE: 124 MG/DL (ref 70–110)
POCT GLUCOSE: 136 MG/DL (ref 70–110)
POTASSIUM SERPL-SCNC: 4.4 MMOL/L
RBC # BLD AUTO: 4.15 M/UL
SODIUM SERPL-SCNC: 135 MMOL/L
WBC # BLD AUTO: 13.06 K/UL

## 2017-12-20 PROCEDURE — 80048 BASIC METABOLIC PNL TOTAL CA: CPT

## 2017-12-20 PROCEDURE — 94799 UNLISTED PULMONARY SVC/PX: CPT

## 2017-12-20 PROCEDURE — 11000001 HC ACUTE MED/SURG PRIVATE ROOM

## 2017-12-20 PROCEDURE — 25000003 PHARM REV CODE 250: Performed by: NURSE PRACTITIONER

## 2017-12-20 PROCEDURE — 63600175 PHARM REV CODE 636 W HCPCS: Performed by: INTERNAL MEDICINE

## 2017-12-20 PROCEDURE — G8989 SELF CARE D/C STATUS: HCPCS | Mod: CL

## 2017-12-20 PROCEDURE — 96372 THER/PROPH/DIAG INJ SC/IM: CPT

## 2017-12-20 PROCEDURE — B4185 PARENTERAL SOL 10 GM LIPIDS: HCPCS | Performed by: NURSE PRACTITIONER

## 2017-12-20 PROCEDURE — 63600175 PHARM REV CODE 636 W HCPCS: Performed by: SURGERY

## 2017-12-20 PROCEDURE — S0030 INJECTION, METRONIDAZOLE: HCPCS | Performed by: SURGERY

## 2017-12-20 PROCEDURE — 25000003 PHARM REV CODE 250: Performed by: INTERNAL MEDICINE

## 2017-12-20 PROCEDURE — G8988 SELF CARE GOAL STATUS: HCPCS | Mod: CM

## 2017-12-20 PROCEDURE — 99231 SBSQ HOSP IP/OBS SF/LOW 25: CPT | Mod: ,,, | Performed by: INTERNAL MEDICINE

## 2017-12-20 PROCEDURE — 93010 ELECTROCARDIOGRAM REPORT: CPT | Mod: ,,, | Performed by: INTERNAL MEDICINE

## 2017-12-20 PROCEDURE — 83735 ASSAY OF MAGNESIUM: CPT

## 2017-12-20 PROCEDURE — S0028 INJECTION, FAMOTIDINE, 20 MG: HCPCS | Performed by: INTERNAL MEDICINE

## 2017-12-20 PROCEDURE — 85025 COMPLETE CBC W/AUTO DIFF WBC: CPT

## 2017-12-20 PROCEDURE — 93005 ELECTROCARDIOGRAM TRACING: CPT

## 2017-12-20 PROCEDURE — 97167 OT EVAL HIGH COMPLEX 60 MIN: CPT

## 2017-12-20 PROCEDURE — 99233 SBSQ HOSP IP/OBS HIGH 50: CPT | Mod: ,,, | Performed by: INTERNAL MEDICINE

## 2017-12-20 PROCEDURE — 25000003 PHARM REV CODE 250: Performed by: SURGERY

## 2017-12-20 PROCEDURE — 21400001 HC TELEMETRY ROOM

## 2017-12-20 PROCEDURE — G8987 SELF CARE CURRENT STATUS: HCPCS | Mod: CN

## 2017-12-20 RX ORDER — METOPROLOL TARTRATE 1 MG/ML
5 INJECTION, SOLUTION INTRAVENOUS EVERY 6 HOURS
Status: DISCONTINUED | OUTPATIENT
Start: 2017-12-20 | End: 2017-12-24 | Stop reason: HOSPADM

## 2017-12-20 RX ORDER — FAMOTIDINE 20 MG/50ML
20 INJECTION, SOLUTION INTRAVENOUS 2 TIMES DAILY
Status: DISCONTINUED | OUTPATIENT
Start: 2017-12-20 | End: 2017-12-24 | Stop reason: HOSPADM

## 2017-12-20 RX ADMIN — ENOXAPARIN SODIUM 40 MG: 100 INJECTION SUBCUTANEOUS at 11:12

## 2017-12-20 RX ADMIN — MEROPENEM 1 G: 1 INJECTION, POWDER, FOR SOLUTION INTRAVENOUS at 08:12

## 2017-12-20 RX ADMIN — SODIUM CHLORIDE: 0.9 INJECTION, SOLUTION INTRAVENOUS at 10:12

## 2017-12-20 RX ADMIN — METOROPROLOL TARTRATE 5 MG: 5 INJECTION, SOLUTION INTRAVENOUS at 10:12

## 2017-12-20 RX ADMIN — ENOXAPARIN SODIUM 40 MG: 100 INJECTION SUBCUTANEOUS at 12:12

## 2017-12-20 RX ADMIN — CIPROFLOXACIN 400 MG: 2 INJECTION, SOLUTION INTRAVENOUS at 10:12

## 2017-12-20 RX ADMIN — METOPROLOL TARTRATE 5 MG: 5 INJECTION INTRAVENOUS at 11:12

## 2017-12-20 RX ADMIN — MEROPENEM 1 G: 1 INJECTION, POWDER, FOR SOLUTION INTRAVENOUS at 12:12

## 2017-12-20 RX ADMIN — SODIUM CHLORIDE: 0.9 INJECTION, SOLUTION INTRAVENOUS at 03:12

## 2017-12-20 RX ADMIN — SOYBEAN OIL 250 ML: 20 INJECTION, SOLUTION INTRAVENOUS at 12:12

## 2017-12-20 RX ADMIN — METRONIDAZOLE 500 MG: 500 INJECTION, SOLUTION INTRAVENOUS at 05:12

## 2017-12-20 RX ADMIN — CIPROFLOXACIN 400 MG: 2 INJECTION, SOLUTION INTRAVENOUS at 12:12

## 2017-12-20 RX ADMIN — FAMOTIDINE 20 MG: 20 INJECTION, SOLUTION INTRAVENOUS at 09:12

## 2017-12-20 RX ADMIN — METRONIDAZOLE 500 MG: 500 INJECTION, SOLUTION INTRAVENOUS at 10:12

## 2017-12-20 RX ADMIN — ASCORBIC ACID, VITAMIN A PALMITATE, CHOLECALCIFEROL, THIAMINE HYDROCHLORIDE, RIBOFLAVIN-5 PHOSPHATE SODIUM, PYRIDOXINE HYDROCHLORIDE, NIACINAMIDE, DEXPANTHENOL, ALPHA-TOCOPHEROL ACETATE, VITAMIN K1, FOLIC ACID, BIOTIN, CYANOCOBALAMIN: 200; 3300; 200; 6; 3.6; 6; 40; 15; 10; 150; 600; 60; 5 INJECTION, SOLUTION INTRAVENOUS at 12:12

## 2017-12-20 RX ADMIN — METOPROLOL TARTRATE 5 MG: 5 INJECTION INTRAVENOUS at 06:12

## 2017-12-20 RX ADMIN — CIPROFLOXACIN 400 MG: 2 INJECTION, SOLUTION INTRAVENOUS at 11:12

## 2017-12-20 RX ADMIN — METRONIDAZOLE 500 MG: 500 INJECTION, SOLUTION INTRAVENOUS at 12:12

## 2017-12-20 RX ADMIN — FAMOTIDINE 20 MG: 10 INJECTION, SOLUTION INTRAVENOUS at 08:12

## 2017-12-20 NOTE — ASSESSMENT & PLAN NOTE
-Concerning for possible obstructing mass.   -Discussed plan of care with Dr. Ballard.   -Keep patient NPO for possible surgery.   -NG tube placement due to persistent vomiting.    12/19 - Patient s/p colectomy. Tolerated procedure awaiting pathology - Likely CA  12/20 - Sx on consult. Path pending. Heme Onc on consult

## 2017-12-20 NOTE — SUBJECTIVE & OBJECTIVE
Interval History: Extubated    Review of Systems   Constitutional: Positive for fatigue. Negative for appetite change and fever.   HENT: Negative.  Negative for congestion, sinus pressure and sore throat.    Eyes: Negative.  Negative for photophobia, discharge and visual disturbance.   Respiratory: Negative.  Negative for cough, chest tightness, shortness of breath and wheezing.    Cardiovascular: Negative.  Negative for chest pain and palpitations.   Gastrointestinal: Positive for abdominal distention, abdominal pain and diarrhea. Negative for blood in stool, constipation, nausea and vomiting.   Endocrine: Negative.    Genitourinary: Negative.    Musculoskeletal: Negative.  Negative for myalgias, neck pain and neck stiffness.   Skin: Negative.    Allergic/Immunologic: Negative.    Neurological: Positive for weakness. Negative for seizures, syncope and headaches.   Hematological: Negative.    Psychiatric/Behavioral: Negative.  Negative for agitation, behavioral problems, hallucinations, self-injury and suicidal ideas.     Objective:     Vital Signs (Most Recent):  Temp: 97.5 °F (36.4 °C) (12/20/17 0700)  Pulse: 92 (12/20/17 0800)  Resp: 18 (12/20/17 0800)  BP: (!) 159/67 (12/20/17 0800)  SpO2: 95 % (12/20/17 0800) Vital Signs (24h Range):  Temp:  [97.5 °F (36.4 °C)-100.2 °F (37.9 °C)] 97.5 °F (36.4 °C)  Pulse:  [77-94] 92  Resp:  [15-24] 18  SpO2:  [93 %-100 %] 95 %  BP: ()/(34-81) 159/67  Arterial Line BP: ()/(41-64) 175/61     Weight: 76.5 kg (168 lb 10.4 oz)  Body mass index is 28.07 kg/m².    Intake/Output Summary (Last 24 hours) at 12/20/17 0922  Last data filed at 12/20/17 0730   Gross per 24 hour   Intake          3888.58 ml   Output             2220 ml   Net          1668.58 ml      Physical Exam   Constitutional: He is oriented to person, place, and time. He appears well-developed and well-nourished.   HENT:   Head: Normocephalic and atraumatic.   Eyes: EOM are normal. Pupils are equal, round,  and reactive to light.   Neck: Normal range of motion. Neck supple.   Cardiovascular: Normal rate, regular rhythm and intact distal pulses.    Murmur heard.  Pulmonary/Chest: Effort normal and breath sounds normal. No respiratory distress. He has no wheezes.   Abdominal: Soft. He exhibits distension. There is tenderness.   Musculoskeletal: Normal range of motion. He exhibits no deformity.   Neurological: He is alert and oriented to person, place, and time. He has normal reflexes.   Skin: Skin is warm and dry. Capillary refill takes less than 2 seconds.   Psychiatric: He has a normal mood and affect. His behavior is normal. Judgment and thought content normal.   Nursing note and vitals reviewed.      Significant Labs:   BMP:   Recent Labs  Lab 12/20/17  0500   *   *   K 4.4      CO2 23   BUN 20   CREATININE 0.7   CALCIUM 8.2*   MG 2.1     CBC:   Recent Labs  Lab 12/19/17  0352 12/20/17  0500   WBC 7.45  7.45 13.06*   HGB 13.8*  13.8* 12.1*   HCT 39.3*  39.3* 35.5*     237 245     CMP:   Recent Labs  Lab 12/19/17  0352 12/19/17  0908 12/19/17  1455 12/20/17  0500   *  134*  --  133* 135*   K 2.9*  2.9*  --  5.1 4.4     107  --  103 103   CO2 16*  16*  --  22* 23   *  208*  --  168* 117*   BUN 19  19  --  26* 20   CREATININE 0.7  0.7  --  0.9 0.7   CALCIUM 5.7*  5.7*  --  8.0* 8.2*   ALBUMIN  --  1.9*  --   --    ANIONGAP 11  11  --  8 9   EGFRNONAA >60  >60  --  >60 >60     Urine Studies:   Recent Labs  Lab 12/18/17  1015   COLORU Yellow   APPEARANCEUA Clear   PHUR 6.0   SPECGRAV 1.025   PROTEINUA 1+*   GLUCUA Negative   KETONESU 1+*   BILIRUBINUA 1+*   OCCULTUA Negative   NITRITE Negative   UROBILINOGEN 2.0-3.0*   LEUKOCYTESUR Negative   RBCUA 2   WBCUA 5   BACTERIA Few*   HYALINECASTS 0     All pertinent labs within the past 24 hours have been reviewed.    Significant Imaging: I have reviewed all pertinent imaging results/findings within the past 24 hours.

## 2017-12-20 NOTE — CONSULTS
Consult received for TPN Recommendations.     Recommendation: Clinimix E 5/15 at 40ml/hr x24 hours, increase to 75ml/hr goal rate with 250ml 20% lipids every 48 hours. (1528 calories, 90g protein, 1925ml fluid)     Please see note dated 12/19/17 for full assessment. Thanks!  RStrate, RDN, LDN

## 2017-12-20 NOTE — PLAN OF CARE
Problem: Patient Care Overview  Goal: Plan of Care Review  Outcome: Ongoing (interventions implemented as appropriate)  Received patient from ICU today. Patient remains free from falls and all safety precautions are still maintained. Bed locked/lowered. Afebrile. NG tube clean, dry and intact to low intermittent wall suction and taped to nose. Patient states 0 pain on a pain scale of 0-10. No s/s of acute distress. Will continue to monitor. 12 hour chart check.

## 2017-12-20 NOTE — PROGRESS NOTES
"Follow up visit with Mr. Sumner. He is awake and alert at this time, daughter Kimberly present at bedside.  He has been extubated since last visit.  LUQ colostomy assessed.  Stoma pink and moist, liquid brown stool output noted.  Colostomy educated initiated.  On this visit discussed pouching, how and when to empty, and how to identify leaking pouch.  Education to bedside including Convatec starter kit and folder with printed info.  Patient enrolled x3 after receiving verbal consent from daughter.  Plan to visit daily M-F for continued colostomy education and reinformcement. Plan to change pouch Friday, earlier if leaking.      Please review Saskia's procedure "Pouching : Colostomy or Ileostomy" for instructions on ostomy.stoma care. Change ostomy appliance weekly or IMMEDIATELY IF LEAKING. All ostomy care supplies can be requested from [facility] management.    OSTOMY CARE SUPPLIES:  One Piece Pouch #[id number]    Brava Stoma Ring #[id number]    Cavilon Spray #[id number]            "

## 2017-12-20 NOTE — ASSESSMENT & PLAN NOTE
S/p transverse colon resection and colostomy formation.   POD2  Ok to transfer to floor per primary team/critical care teams   Continue NG tube  Continue colostomy care and wound care  Dvt/gi prophylaxis

## 2017-12-20 NOTE — PLAN OF CARE
Problem: Patient Care Overview  Goal: Plan of Care Review  Outcome: Ongoing (interventions implemented as appropriate)  Patient remains alert over night, disorientated to time . Nl complaints of pain voiced over night , abdomen remains taut, bowel sounds hypoactive. Colostomy draining dark drown drainage and NGT draining dark brown thick drainage. Is being observed.

## 2017-12-20 NOTE — PT/OT/SLP EVAL
Occupational Therapy   Evaluation    Name: Herb Sumner  MRN: [id number]  Admitting Diagnosis:  Bowel obstruction 2 Days Post-Op    Recommendations:     Discharge Recommendations: home health OT, nursing facility, skilled (depending on progress)  Discharge Equipment Recommendations:  other (see comments) (tba at later time)  Barriers to discharge:  None    History:     Occupational Profile:  Living Environment: lives in one story home, no step entrance  Previous level of function: indep, still drives,  Roles and Routines: occupational therapy   Equipment Owned:  none  Assistance upon Discharge: unclear at this time     Past Medical History:   Diagnosis Date    EtOH dependence     Hyperlipemia     Hyperlipidemia        Past Surgical History:   Procedure Laterality Date    BACK SURGERY         Subjective     Chief Complaint: no c/o, seems lethargic  Patient/Family stated goals: to return home  Communicated with: pt, and Dinorah - nurse prior to session.  Pain/Comfort:  · Pain Rating 1: 0/10    Objective:     Patient found with: blood pressure cuff, NG tube, peripheral IV, telemetry    General Precautions: Standard, fall   Orthopedic Precautions:N/A   Braces: N/A     Occupational Performance:    Bed Mobility:    · Pt refused.    Functional Mobility/Transfers:  · Pt refused    Activities of Daily Living:  · Feeding:  npo, NG tube -  · Grooming: minimum assistance -  · UB Dressing: maximal assistance -  · LB Dressing: total assistance -  · Toileting: total assistance -    Cognitive/Visual Perceptual:  Cognitive/Psychosocial Skills:     -       Oriented to: Person   -       Follows Commands/attention:Follows one-step commands  -       Communication: clear/fluent  -       Mood/Affect/Coping skills/emotional control: Appropriate to situation and seems sleepy/drowsy    Physical Exam:  Upper Extremity Range of Motion:     -       Right Upper Extremity: WFL  -       Left Upper Extremity: WFL  Upper Extremity Strength:    -    "    Right Upper Extremity: grossly 4/5  -       Left Upper Extremity: grossly 4/5    Patient left supine with all lines intact and call button in reach    AMPAC 6 Click:  AMPA Total Score: 9    Treatment & Education:    Education:    Assessment:     Herb Sumner is a 77 y.o. male with a medical diagnosis of Bowel obstruction.  He presents with impaired self care and fx mobility.  Performance deficits affecting function are weakness, impaired endurance, impaired self care skills, impaired functional mobilty, gait instability, impaired balance, decreased coordination.      Rehab Prognosis:  good; patient would benefit from acute skilled OT services to address these deficits and reach maximum level of function.         Clinical Decision Making:     3.  OT High:  "Pt evaluation falls under high complexity for evaluation category due to 5+ performance deficits identified with comprehensive assessments and significant modifications/assistance required. An expanded review of history and occupational profile completed in addition to expanded review of physical, cognitive and psychosocial history. Several treatment options considered in care."     Plan:     Patient to be seen 3 x/week to address the above listed problems via self-care/home management, therapeutic activities, therapeutic exercises  · Plan of Care Expires: 12/27/17  · Plan of Care Reviewed with: patient    This Plan of care has been discussed with the patient who was involved in its development and understands and is in agreement with the identified goals and treatment plan    GOALS:    Occupational Therapy Goals        Problem: Occupational Therapy Goal    Goal Priority Disciplines Outcome Interventions   Occupational Therapy Goal     OT, PT/OT     Description:  Goals to be met by: 12/27/17     Patient will increase functional independence with ADLs by performing:    UE Dressing with Moderate Assistance.  LE Dressing with Moderate Assistance.  Toileting " from bedside commode with Moderate Assistance for hygiene and clothing management.   Toilet transfer to bedside commode with Minimal Assistance.  Upper extremity exercise program x10 reps per handout, with supervision.                      Time Tracking:     OT Date of Treatment: 12/20/17  OT Start Time: 1433  OT Stop Time: 1453  OT Total Time (min): 20 min    Billable Minutes:Evaluation x  20 min    Ewa Grace, OT  12/20/2017

## 2017-12-20 NOTE — PLAN OF CARE
PHAM spoke at length with the patient's dtr. She stated she has POA p[aerwork and just needs to have it printed out.  She stated they want everything done for the patient. Their mother had a CVA. He has been her cg. He has been sick for quite some time now and has neglectged his eugenie care. She stated they are aware of the cancer and it has mets but they are awaiting the report of where it has actually spread to. She plans to be aggressive with his care. She wants to return back to [place], [place] where she lives and her brother lives in [place], [place]. She stated her parents would probably want to stay here but they are both not well enough to take care of themselves. She asked for resources for housekeeping and personal care for both parents. CM to f/u with an agency ie [facility] that is in multiple states.PHAM went to speak with the patient but he was asleep and at the dtr request , she asked to let him get some rest because he did not rest well last night.  1400 - CM returned and patient is asleep. CM to f/u with [facility]

## 2017-12-20 NOTE — PROGRESS NOTES
[facility] Hospital Medicine  Progress Note    Patient Name: Herb Sumner  MRN: [id number]  Patient Class: IP- Inpatient   Admission Date: 12/18/2017  Length of Stay: 2 days  Attending Physician: Anjel Strickland MD  Primary Care Provider: Primary Doctor No        Subjective:     Principal Problem:Bowel obstruction    HPI:  Herb Sumner is a 77 year old male who presented to [facility] with reports of a 2 weeks history of worsening abdominal distention, belching, nausea and vomiting. The patient reports that his abdominal distention was initially mild and progressed over the last two weeks. He had some intermittent relief with vegetable laxatives, but symptoms returned. Today, the patient came to the ED because he could not tolerate even water without vomiting. There is associated frequent belching and hiccups. He reports that his intake has gradually decreased since the onset of symptoms. His last bowel movement was 5 days ago and he last passed gas one day ago. In the ED CT scan of the abdomen showed multiple liver lesions as well as distended small bowel and colon. The patient was also found to have hyponatremia with a sodium of 125. Code status was discussed with the patient who is a full code. The patient is unsure of if or when he last had a colonoscopy. His daughter, Kimberly Sumner is his surrogate medical decision maker.     Hospital Course:  12/19 Patient s/p colectomy. Remains intubated. Patient responsive to command can MEYER. Patient denies significant discomfort. ART line inserted. Case discussed with Surgery / Pulmonary CC    12/20 - Patient extubated / communicative. Patient denies complaint. Abdomen remains distended with hypoactive BS. Case discussed with Surgery. Initiate TPN and await return of bowel function. Heme Onc has been consulted for Obstructing Colon mass with apparent liver mets. Bx result pending.     Interval History: Extubated    Review of Systems    Constitutional: Positive for fatigue. Negative for appetite change and fever.   HENT: Negative.  Negative for congestion, sinus pressure and sore throat.    Eyes: Negative.  Negative for photophobia, discharge and visual disturbance.   Respiratory: Negative.  Negative for cough, chest tightness, shortness of breath and wheezing.    Cardiovascular: Negative.  Negative for chest pain and palpitations.   Gastrointestinal: Positive for abdominal distention, abdominal pain and diarrhea. Negative for blood in stool, constipation, nausea and vomiting.   Endocrine: Negative.    Genitourinary: Negative.    Musculoskeletal: Negative.  Negative for myalgias, neck pain and neck stiffness.   Skin: Negative.    Allergic/Immunologic: Negative.    Neurological: Positive for weakness. Negative for seizures, syncope and headaches.   Hematological: Negative.    Psychiatric/Behavioral: Negative.  Negative for agitation, behavioral problems, hallucinations, self-injury and suicidal ideas.     Objective:     Vital Signs (Most Recent):  Temp: 97.5 °F (36.4 °C) (12/20/17 0700)  Pulse: 92 (12/20/17 0800)  Resp: 18 (12/20/17 0800)  BP: (!) 159/67 (12/20/17 0800)  SpO2: 95 % (12/20/17 0800) Vital Signs (24h Range):  Temp:  [97.5 °F (36.4 °C)-100.2 °F (37.9 °C)] 97.5 °F (36.4 °C)  Pulse:  [77-94] 92  Resp:  [15-24] 18  SpO2:  [93 %-100 %] 95 %  BP: ()/(34-81) 159/67  Arterial Line BP: ()/(41-64) 175/61     Weight: 76.5 kg (168 lb 10.4 oz)  Body mass index is 28.07 kg/m².    Intake/Output Summary (Last 24 hours) at 12/20/17 0922  Last data filed at 12/20/17 0730   Gross per 24 hour   Intake          3888.58 ml   Output             2220 ml   Net          1668.58 ml      Physical Exam   Constitutional: He is oriented to person, place, and time. He appears well-developed and well-nourished.   HENT:   Head: Normocephalic and atraumatic.   Eyes: EOM are normal. Pupils are equal, round, and reactive to light.   Neck: Normal range of  motion. Neck supple.   Cardiovascular: Normal rate, regular rhythm and intact distal pulses.    Murmur heard.  Pulmonary/Chest: Effort normal and breath sounds normal. No respiratory distress. He has no wheezes.   Abdominal: Soft. He exhibits distension. There is tenderness.   Musculoskeletal: Normal range of motion. He exhibits no deformity.   Neurological: He is alert and oriented to person, place, and time. He has normal reflexes.   Skin: Skin is warm and dry. Capillary refill takes less than 2 seconds.   Psychiatric: He has a normal mood and affect. His behavior is normal. Judgment and thought content normal.   Nursing note and vitals reviewed.      Significant Labs:   BMP:   Recent Labs  Lab 12/20/17  0500   *   *   K 4.4      CO2 23   BUN 20   CREATININE 0.7   CALCIUM 8.2*   MG 2.1     CBC:   Recent Labs  Lab 12/19/17  0352 12/20/17  0500   WBC 7.45  7.45 13.06*   HGB 13.8*  13.8* 12.1*   HCT 39.3*  39.3* 35.5*     237 245     CMP:   Recent Labs  Lab 12/19/17  0352 12/19/17  0908 12/19/17  1455 12/20/17  0500   *  134*  --  133* 135*   K 2.9*  2.9*  --  5.1 4.4     107  --  103 103   CO2 16*  16*  --  22* 23   *  208*  --  168* 117*   BUN 19  19  --  26* 20   CREATININE 0.7  0.7  --  0.9 0.7   CALCIUM 5.7*  5.7*  --  8.0* 8.2*   ALBUMIN  --  1.9*  --   --    ANIONGAP 11  11  --  8 9   EGFRNONAA >60  >60  --  >60 >60     Urine Studies:   Recent Labs  Lab 12/18/17  1015   COLORU Yellow   APPEARANCEUA Clear   PHUR 6.0   SPECGRAV 1.025   PROTEINUA 1+*   GLUCUA Negative   KETONESU 1+*   BILIRUBINUA 1+*   OCCULTUA Negative   NITRITE Negative   UROBILINOGEN 2.0-3.0*   LEUKOCYTESUR Negative   RBCUA 2   WBCUA 5   BACTERIA Few*   HYALINECASTS 0     All pertinent labs within the past 24 hours have been reviewed.    Significant Imaging: I have reviewed all pertinent imaging results/findings within the past 24 hours.    Assessment/Plan:      * Bowel obstruction     -Concerning for possible obstructing mass.   -Discussed plan of care with Dr. Ballard.   -Keep patient NPO for possible surgery.   -NG tube placement due to persistent vomiting.    12/19 - Patient s/p colectomy. Tolerated procedure awaiting pathology - Likely CA  12/20 - Sx on consult. Path pending. Heme Onc on consult          Hyperglycemia, unspecified    12/20 - Controlled / NISS          S/P colostomy    Surgery on Consult          Atrial fibrillation with rapid ventricular response    12/20 - Controlled / Monitor / BB PRN          Small bowel ischemia    S/p rsx POD#1    12/20 - POD # 2 Await resumption of bowel function.           Shock    Pressors  ABX  Monitor    12/20 - Stable          Acute hypoxemic respiratory failure    12/19 - Intubated. Mild sedation. Weaning as tolerated  12/20 - Extubated / Pulmonary CC          Malignant neoplasm of descending colon    12/19 - Awaiting pathology. Discussed with Family. Plan for Heme Onc  12/20 - Path pending          Liver masses    -Possibly represent metastasis associated with obstructing mass.   -Further work up will be planned as outpatient, if needed.     12/19 - Bx pending  12/20 - Bx pending          VTE Risk Mitigation         Ordered     enoxaparin injection 40 mg  Every 24 hours (non-standard times)     Route:  Subcutaneous        12/18/17 2325     Reason for No Pharmacological VTE Prophylaxis  Once      12/18/17 2325     Medium Risk of VTE  Once      12/18/17 2325     Place sequential compression device  Until discontinued      12/18/17 1620          Critical care time spent on the evaluation and treatment of severe organ dysfunction, review of pertinent labs and imaging studies, discussions with consulting providers and discussions with patient/family: 35 minutes.    Anjel Strickland MD  Department of Hospital Medicine   [facility]

## 2017-12-20 NOTE — PROGRESS NOTES
[facility]  Critical Care Medicine  Progress Note    Patient Name: Herb Sumner  MRN: [id number]  Admission Date: 12/18/2017  Hospital Length of Stay: 2 days  Code Status: Full Code  Attending Provider: Anjel Strickland MD  Primary Care Provider: Primary Doctor No   Principal Problem: Bowel obstruction    Subjective:     HPI:  Mr Sumner is a 76 yo WM with a PMH of HLD and daily mild etoh consumption.  He presented to [facility] ED last night with complaints of 2 week hx of worsening constant abd pain, abd distention, N/V, loss of appetite, malaise and obstipation of mod to severe severity.  CT Abd in ED showed multiple liver lesions as well as distended small bowel and colon, Na 125.  Surgery consulted and patient taken to OR by Dr. Ballard finding severely dilated small bowel was found to be ischemic due to pressure/distension. The entire colon was severely distended, and the malignant stricture was noted at the distal end of descending colon. Liver metastatic lesions were noted and excisional biopsy done.  He also underwent Expl Lap with Colostomy.  He was admitted to ICU post op intubated on mech ventilation and hypotensive.  He was also in A-fib on Cardizem and Levophed infusions.     Hospital/ICU Course:  12/19 - fully awake and responsive on mech ventilation on Levophed, Cardizem and Precedex infusions still hypotensive in A-Fib at 112 bpm.  12/20 - Dano extubation yesterday and weaned off pressor.  Up in chair today.  Fully awake and VSS.  Brown loose Stool in Colostomy    Review of Systems   Constitutional: Negative for chills, fever and malaise/fatigue.   HENT: Negative for congestion.    Eyes: Negative for blurred vision.   Respiratory: Positive for cough. Negative for sputum production and shortness of breath.    Cardiovascular: Negative for chest pain and leg swelling.   Gastrointestinal: Positive for abdominal pain (post op). Negative for nausea and vomiting.   Genitourinary: Negative for  dysuria.   Musculoskeletal: Negative for myalgias.   Skin: Negative for rash.   Neurological: Positive for weakness. Negative for dizziness and headaches.   Endo/Heme/Allergies: Does not bruise/bleed easily.   Psychiatric/Behavioral: The patient is not nervous/anxious.        Objective:     Vital Signs (Most Recent):  Temp: 97.5 °F (36.4 °C) (12/20/17 0700)  Pulse: 89 (12/20/17 1027)  Resp: (!) 21 (12/20/17 1027)  BP: (!) 183/68 (12/20/17 1000)  SpO2: 97 % (12/20/17 1027) Vital Signs (24h Range):  Temp:  [97.5 °F (36.4 °C)-100.2 °F (37.9 °C)] 97.5 °F (36.4 °C)  Pulse:  [77-94] 89  Resp:  [15-24] 21  SpO2:  [93 %-100 %] 97 %  BP: ()/(34-81) 183/68  Arterial Line BP: ()/(41-64) 175/61     Weight: 76.5 kg (168 lb 10.4 oz)  Body mass index is 28.07 kg/m².      Intake/Output Summary (Last 24 hours) at 12/20/17 1047  Last data filed at 12/20/17 0900   Gross per 24 hour   Intake          4088.58 ml   Output             2490 ml   Net          1598.58 ml       Physical Exam   Constitutional: He is oriented to person, place, and time. Vital signs are normal. He appears well-developed and well-nourished. He is cooperative.  Non-toxic appearance. He appears ill. No distress. Nasal cannula in place.   HENT:   Head: Normocephalic and atraumatic.   Mouth/Throat: Oropharynx is clear and moist and mucous membranes are normal.   Eyes: EOM and lids are normal. Pupils are equal, round, and reactive to light.   Neck: Trachea normal and normal range of motion. Carotid bruit is not present.       Cardiovascular: Normal rate, regular rhythm and normal heart sounds.    Pulses:       Radial pulses are 2+ on the right side, and 2+ on the left side.        Dorsalis pedis pulses are 1+ on the right side, and 1+ on the left side.   bilat feet warm and no cyanosis   Pulmonary/Chest: Effort normal. No accessory muscle usage. No respiratory distress. He has decreased breath sounds in the right lower field and the left lower field.    Abdominal: Soft. He exhibits no distension. Bowel sounds are decreased. There is generalized tenderness.       Genitourinary: Penis normal.   Musculoskeletal: Normal range of motion.        Right foot: There is no deformity.        Left foot: There is no deformity.   No edema   Lymphadenopathy:     He has no cervical adenopathy.   Neurological: He is alert and oriented to person, place, and time.   Skin: Skin is warm and dry. Capillary refill takes less than 2 seconds. No rash noted. No cyanosis.   Psychiatric: He has a normal mood and affect. His speech is normal and behavior is normal. Judgment and thought content normal. Cognition and memory are normal.       Vents:  Vent Mode: Spont (12/19/17 1034)  Ventilator Initiated: Yes (12/18/17 2300)  Set Rate: 0 bmp (12/19/17 1034)  Vt Set: 420 mL (12/19/17 1034)  Pressure Support: 10 cmH20 (12/19/17 1034)  PEEP/CPAP: 10 cmH20 (12/19/17 1034)  Oxygen Concentration (%): 32 (12/19/17 2057)  Peak Airway Pressure: 21 cmH2O (12/19/17 1034)  Plateau Pressure: 0 cmH20 (12/19/17 1034)  Total Ve: 12.7 mL (12/19/17 1034)  F/VT Ratio<105 (RSBI): (!) 15.47 (12/19/17 1034)    Lines/Drains/Airways     Central Venous Catheter Line                 Percutaneous Central Line Insertion/Assessment - triple lumen  12/19/17 0740 left internal jugular 1 day          Drain                 Colostomy 12/18/17 2300 Descending/sigmoid LLQ 1 day         NG/OG Tube 12/18/17 1546 nasogastric 16 Fr. Right nostril 1 day          Peripheral Intravenous Line                 Peripheral IV - Single Lumen 12/18/17 0900 Left Antecubital 2 days                Significant Labs:    CBC/Anemia Profile:    Recent Labs  Lab 12/19/17  0352 12/20/17  0500   WBC 7.45  7.45 13.06*   HGB 13.8*  13.8* 12.1*   HCT 39.3*  39.3* 35.5*     237 245   MCV 85  85 86   RDW 13.6  13.6 14.0        Chemistries:    Recent Labs  Lab 12/19/17  0352 12/19/17  0908 12/19/17  1455 12/20/17  0500   *  134*  --  133*  135*   K 2.9*  2.9*  --  5.1 4.4     107  --  103 103   CO2 16*  16*  --  22* 23   BUN 19  19  --  26* 20   CREATININE 0.7  0.7  --  0.9 0.7   CALCIUM 5.7*  5.7*  --  8.0* 8.2*   ALBUMIN  --  1.9*  --   --    MG  --   --  2.2 2.1           Assessment/Plan:     Cardiac/Vascular   Atrial fibrillation with rapid ventricular response    Currently in NSR  Cont Tele monitoring  Off pressor        Oncology   Malignant neoplasm of descending colon    Path pending with liver bx pending  Hem/Onc consulted        Endocrine   Hyperglycemia, unspecified    cont SSI given will start TPN        GI   * Bowel obstruction    POD #1 S/P Expl Lap with Colostomy  Surgery following        Liver masses    Path from bx pending  Onc consulted        S/P colostomy    Surgery following POD #1  Loose brown stool noted        Small bowel ischemia    Surgery following  POD #1 S/P Colostomy  Cont Merrem, Cipro and Flagyl  Cont NG suctioning and IVFs        Preventive Measures and Monitoring:   Stress Ulcer: Pepcid  Nutrition: NPO  Glucose control: SSI  Bowel prophylaxis: Senokot and PRN Dulcolax  DVT prophylaxis: LMWH/SCDs  Hx CAD on B-Blocker: no hx CAD  Head of Bed/Reposition: Elevate HOB and turn Q1-2 hours   Early Mobility: OOB today  NG Day: #3  Central Line Left IJ Day: #2  IVAB Day: #2  Code Status: Full  Pneumonia Vaccine: ordered  Flu Vaccine: ordered     Counseling/Consultation:I have discussed the care of this patient in detail with the bedside nursing staff and Dr. Gillette, Dr. Strickland and Dr. Ballard    Will transfer to [employer]/Tele at request of Dr. Ballard.  Will sign off.      Denzel Arzate NP  Critical Care Medicine  [facility]

## 2017-12-20 NOTE — SUBJECTIVE & OBJECTIVE
Review of Systems   Constitutional: Negative for chills, fever and malaise/fatigue.   HENT: Negative for congestion.    Eyes: Negative for blurred vision.   Respiratory: Positive for cough. Negative for sputum production and shortness of breath.    Cardiovascular: Negative for chest pain and leg swelling.   Gastrointestinal: Positive for abdominal pain (post op). Negative for nausea and vomiting.   Genitourinary: Negative for dysuria.   Musculoskeletal: Negative for myalgias.   Skin: Negative for rash.   Neurological: Positive for weakness. Negative for dizziness and headaches.   Endo/Heme/Allergies: Does not bruise/bleed easily.   Psychiatric/Behavioral: The patient is not nervous/anxious.        Objective:     Vital Signs (Most Recent):  Temp: 97.5 °F (36.4 °C) (12/20/17 0700)  Pulse: 89 (12/20/17 1027)  Resp: (!) 21 (12/20/17 1027)  BP: (!) 183/68 (12/20/17 1000)  SpO2: 97 % (12/20/17 1027) Vital Signs (24h Range):  Temp:  [97.5 °F (36.4 °C)-100.2 °F (37.9 °C)] 97.5 °F (36.4 °C)  Pulse:  [77-94] 89  Resp:  [15-24] 21  SpO2:  [93 %-100 %] 97 %  BP: ()/(34-81) 183/68  Arterial Line BP: ()/(41-64) 175/61     Weight: 76.5 kg (168 lb 10.4 oz)  Body mass index is 28.07 kg/m².      Intake/Output Summary (Last 24 hours) at 12/20/17 1047  Last data filed at 12/20/17 0900   Gross per 24 hour   Intake          4088.58 ml   Output             2490 ml   Net          1598.58 ml       Physical Exam   Constitutional: He is oriented to person, place, and time. Vital signs are normal. He appears well-developed and well-nourished. He is cooperative.  Non-toxic appearance. He appears ill. No distress. Nasal cannula in place.   HENT:   Head: Normocephalic and atraumatic.   Mouth/Throat: Oropharynx is clear and moist and mucous membranes are normal.   Eyes: EOM and lids are normal. Pupils are equal, round, and reactive to light.   Neck: Trachea normal and normal range of motion. Carotid bruit is not present.        Cardiovascular: Normal rate, regular rhythm and normal heart sounds.    Pulses:       Radial pulses are 2+ on the right side, and 2+ on the left side.        Dorsalis pedis pulses are 1+ on the right side, and 1+ on the left side.   bilat feet warm and no cyanosis   Pulmonary/Chest: Effort normal. No accessory muscle usage. No respiratory distress. He has decreased breath sounds in the right lower field and the left lower field.   Abdominal: Soft. He exhibits no distension. Bowel sounds are decreased. There is generalized tenderness.       Genitourinary: Penis normal.   Musculoskeletal: Normal range of motion.        Right foot: There is no deformity.        Left foot: There is no deformity.   No edema   Lymphadenopathy:     He has no cervical adenopathy.   Neurological: He is alert and oriented to person, place, and time.   Skin: Skin is warm and dry. Capillary refill takes less than 2 seconds. No rash noted. No cyanosis.   Psychiatric: He has a normal mood and affect. His speech is normal and behavior is normal. Judgment and thought content normal. Cognition and memory are normal.       Vents:  Vent Mode: Spont (12/19/17 1034)  Ventilator Initiated: Yes (12/18/17 2300)  Set Rate: 0 bmp (12/19/17 1034)  Vt Set: 420 mL (12/19/17 1034)  Pressure Support: 10 cmH20 (12/19/17 1034)  PEEP/CPAP: 10 cmH20 (12/19/17 1034)  Oxygen Concentration (%): 32 (12/19/17 2057)  Peak Airway Pressure: 21 cmH2O (12/19/17 1034)  Plateau Pressure: 0 cmH20 (12/19/17 1034)  Total Ve: 12.7 mL (12/19/17 1034)  F/VT Ratio<105 (RSBI): (!) 15.47 (12/19/17 1034)    Lines/Drains/Airways     Central Venous Catheter Line                 Percutaneous Central Line Insertion/Assessment - triple lumen  12/19/17 0740 left internal jugular 1 day          Drain                 Colostomy 12/18/17 2300 Descending/sigmoid LLQ 1 day         NG/OG Tube 12/18/17 1546 nasogastric 16 Fr. Right nostril 1 day          Peripheral Intravenous Line                  Peripheral IV - Single Lumen 12/18/17 0900 Left Antecubital 2 days                Significant Labs:    CBC/Anemia Profile:    Recent Labs  Lab 12/19/17  0352 12/20/17  0500   WBC 7.45  7.45 13.06*   HGB 13.8*  13.8* 12.1*   HCT 39.3*  39.3* 35.5*     237 245   MCV 85  85 86   RDW 13.6  13.6 14.0        Chemistries:    Recent Labs  Lab 12/19/17  0352 12/19/17  0908 12/19/17  1455 12/20/17  0500   *  134*  --  133* 135*   K 2.9*  2.9*  --  5.1 4.4     107  --  103 103   CO2 16*  16*  --  22* 23   BUN 19 19  --  26* 20   CREATININE 0.7  0.7  --  0.9 0.7   CALCIUM 5.7*  5.7*  --  8.0* 8.2*   ALBUMIN  --  1.9*  --   --    MG  --   --  2.2 2.1

## 2017-12-20 NOTE — ASSESSMENT & PLAN NOTE
Surgery following  POD #1 S/P Colostomy  Cont Merrem, Cipro and Flagyl  Cont NG suctioning and IVFs

## 2017-12-20 NOTE — ASSESSMENT & PLAN NOTE
-Possibly represent metastasis associated with obstructing mass.   -Further work up will be planned as outpatient, if needed.     12/19 - Bx pending  12/20 - Bx pending

## 2017-12-20 NOTE — PLAN OF CARE
Problem: Patient Care Overview  Goal: Plan of Care Review  Outcome: Ongoing (interventions implemented as appropriate)  POC reviewed with pt. TPN and lipids started, pt has complained of minimal pain today. Up to chair, tolerated well. Pt has remained afebrile this shift.

## 2017-12-20 NOTE — CONSULTS
Consult received for TPN.     Recommendation/Intervention:   1. Consider TPN to meet needs until oral diet or enteral nutrition can be initiated. TPN Recommendations Clinimix E 5/15 at 40ml/hr x24 hours, increase to 75ml/hr goal rate with 250ml 20% lipids every 48 hours. (1528 calories, 90g protein, 1925ml fluid)     Goals: Nutrition support within 5 days if not on oral diet    Nutrition Goal Status: new    Communication of RD Recs: other (comment) (care plan, sticky note, reviewed with NP Paulie)

## 2017-12-20 NOTE — PROGRESS NOTES
[facility] -   General Surgery  Progress Note    Subjective:     History of Present Illness:  Mr. Sumner is 77 year-old male was admitted via ED for the management of acute abdomen with a history of obstipation for last several days. He was complaining of severe abdominal distension with pain. He also complained of nausea and vomited few times. He has not been eating for last 4 days and feels very weak. He denies of abdominal surgery. He is also very into the natural herbal medicine to treat his aliments.    Post-Op Info:  Procedure(s) (LRB):  LAPAROTOMY-EXPLORATORY (N/A)  COLOSTOMY (Left)  BIOPSY-LIVER (N/A)  RESECTION-COLON   2 Days Post-Op     Interval History: extubated, normal sinus rhythm. Some stool from colostomy but NG output is moderate and abdomen is distended    Medications:  Continuous Infusions:   sodium chloride 0.9% 75 mL/hr at 12/20/17 1100    Amino acid 5% - dextrose 15% (CLINIMIX-E) solution with additives (1L  provides 510 kcal/L dextrose, with 50 gm AA, 150 gm CHO, Na 35, K 30, Mg 5, Ca 4.5, Acetate 80, Cl 39, Phos 15)       Scheduled Meds:   calcium gluconate IVPB  1,000 mg Intravenous Once    ciprofloxacin  400 mg Intravenous Q12H    enoxparin  40 mg Subcutaneous Q24H    famotidine (PF)  20 mg Intravenous BID    fat emulsion 20%  250 mL Intravenous Every other day    lactated ringers  500 mL Intravenous Once    meropenem (MERREM) IVPB  1 g Intravenous Q8H    metoprolol  5 mg Intravenous Q6H    metronidazole  500 mg Intravenous Q8H    nozaseptin   Each Nare BID     PRN Meds:bisacodyl, dextrose 50%, diphenhydrAMINE, glucagon (human recombinant), insulin aspart, morphine, ondansetron, promethazine, ramelteon     Review of patient's allergies indicates:  No Known Allergies  Objective:     Vital Signs (Most Recent):  Temp: 98.8 °F (37.1 °C) (12/20/17 1100)  Pulse: 78 (12/20/17 1100)  Resp: (!) 22 (12/20/17 1100)  BP: (!) 183/68 (12/20/17 1000)  SpO2: 95 % (12/20/17 1100)  Vital Signs (24h Range):  Temp:  [97.5 °F (36.4 °C)-98.8 °F (37.1 °C)] 98.8 °F (37.1 °C)  Pulse:  [77-94] 78  Resp:  [15-24] 22  SpO2:  [93 %-100 %] 95 %  BP: ()/(34-81) 183/68  Arterial Line BP: ()/(41-64) 175/61     Weight: 76.5 kg (168 lb 10.4 oz)  Body mass index is 28.07 kg/m².    Intake/Output - Last 3 Shifts       12/18 0700 - 12/19 0659 12/19 0700 - 12/20 0659 12/20 0700 - 12/21 0659    I.V. (mL/kg) 180.2 (2.4) 3235.3 (42.3)     Other   200    NG/GT  90     IV Piggyback 800 1000 200    Total Intake(mL/kg) 980.2 (13.2) 4325.3 (56.5) 400 (5.2)    Urine (mL/kg/hr) 215 1230 (0.7) 70 (0.2)    Drains 350 450 (0.2) 700 (2)    Stool 0 200 (0.1)     Total Output 565 1880 770    Net +415.2 +2445.3 -370           Urine Occurrence 300 x      Stool Occurrence 1 x 1 x           Physical Exam   Constitutional: He is oriented to person, place, and time. He appears well-developed and well-nourished.   HENT:   Head: Normocephalic and atraumatic.   Eyes: EOM are normal.   Cardiovascular: Normal rate and regular rhythm.    Pulmonary/Chest: Effort normal. No respiratory distress.   Abdominal: He exhibits distension. There is tenderness.   Stoma viable, liquid stool in bag.    Musculoskeletal: Normal range of motion.   Neurological: He is alert and oriented to person, place, and time.   Skin: Skin is warm and dry.   Psychiatric: He has a normal mood and affect. Thought content normal.   Vitals reviewed.      Significant Labs:  CBC:   Recent Labs  Lab 12/20/17  0500   WBC 13.06*   RBC 4.15*   HGB 12.1*   HCT 35.5*      MCV 86   MCH 29.2   MCHC 34.1     CMP:   Recent Labs  Lab 12/18/17  0900  12/19/17  0908  12/20/17  0500   *  < >  --   < > 117*   CALCIUM 9.3  < >  --   < > 8.2*   ALBUMIN 3.1*  --  1.9*  --   --    PROT 6.7  --   --   --   --    *  < >  --   < > 135*   K 4.0  < >  --   < > 4.4   CO2 23  < >  --   < > 23   CL 89*  < >  --   < > 103   BUN 26*  < >  --   < > 20   CREATININE 0.8  < >   --   < > 0.7   ALKPHOS 80  --   --   --   --    ALT 18  --   --   --   --    AST 28  --   --   --   --    BILITOT 1.0  --   --   --   --    < > = values in this interval not displayed.    Significant Diagnostics:  I have reviewed all pertinent imaging results/findings within the past 24 hours.    Assessment/Plan:     * Bowel obstruction    S/p transverse colon resection and colostomy formation.   POD2  Ok to transfer to floor per primary team/critical care teams   Continue NG tube  Continue colostomy care and wound care  Dvt/gi prophylaxis            Katelyn Kelly PA-C  General Surgery  [facility]

## 2017-12-20 NOTE — PROGRESS NOTES
Report received from DREW Hanks care assumed. Pt awake and alert without c/o of pain. Awaiting bed on Med/Tele.

## 2017-12-21 LAB
ANION GAP SERPL CALC-SCNC: 10 MMOL/L
BASOPHILS # BLD AUTO: 0.01 K/UL
BASOPHILS NFR BLD: 0.1 %
BUN SERPL-MCNC: 12 MG/DL
CALCIUM SERPL-MCNC: 7.8 MG/DL
CHLORIDE SERPL-SCNC: 102 MMOL/L
CO2 SERPL-SCNC: 25 MMOL/L
CREAT SERPL-MCNC: 0.6 MG/DL
DIFFERENTIAL METHOD: ABNORMAL
EOSINOPHIL # BLD AUTO: 0 K/UL
EOSINOPHIL NFR BLD: 0.2 %
ERYTHROCYTE [DISTWIDTH] IN BLOOD BY AUTOMATED COUNT: 14.1 %
EST. GFR  (AFRICAN AMERICAN): >60 ML/MIN/1.73 M^2
EST. GFR  (NON AFRICAN AMERICAN): >60 ML/MIN/1.73 M^2
GLUCOSE SERPL-MCNC: 135 MG/DL
HCT VFR BLD AUTO: 32.5 %
HGB BLD-MCNC: 11 G/DL
LYMPHOCYTES # BLD AUTO: 0.9 K/UL
LYMPHOCYTES NFR BLD: 7 %
MAGNESIUM SERPL-MCNC: 1.8 MG/DL
MCH RBC QN AUTO: 29 PG
MCHC RBC AUTO-ENTMCNC: 33.8 G/DL
MCV RBC AUTO: 86 FL
MONOCYTES # BLD AUTO: 2.2 K/UL
MONOCYTES NFR BLD: 17.6 %
NEUTROPHILS # BLD AUTO: 9.2 K/UL
NEUTROPHILS NFR BLD: 79.9 %
PLATELET # BLD AUTO: 222 K/UL
PMV BLD AUTO: 9.2 FL
POCT GLUCOSE: 139 MG/DL (ref 70–110)
POCT GLUCOSE: 143 MG/DL (ref 70–110)
POCT GLUCOSE: 182 MG/DL (ref 70–110)
POTASSIUM SERPL-SCNC: 3.6 MMOL/L
RBC # BLD AUTO: 3.79 M/UL
SODIUM SERPL-SCNC: 137 MMOL/L
WBC # BLD AUTO: 12.21 K/UL

## 2017-12-21 PROCEDURE — 94761 N-INVAS EAR/PLS OXIMETRY MLT: CPT

## 2017-12-21 PROCEDURE — 97530 THERAPEUTIC ACTIVITIES: CPT

## 2017-12-21 PROCEDURE — S0030 INJECTION, METRONIDAZOLE: HCPCS | Performed by: SURGERY

## 2017-12-21 PROCEDURE — 93010 ELECTROCARDIOGRAM REPORT: CPT | Mod: ,,, | Performed by: INTERNAL MEDICINE

## 2017-12-21 PROCEDURE — 80048 BASIC METABOLIC PNL TOTAL CA: CPT

## 2017-12-21 PROCEDURE — 97116 GAIT TRAINING THERAPY: CPT

## 2017-12-21 PROCEDURE — 97162 PT EVAL MOD COMPLEX 30 MIN: CPT

## 2017-12-21 PROCEDURE — 25000003 PHARM REV CODE 250: Performed by: INTERNAL MEDICINE

## 2017-12-21 PROCEDURE — 63600175 PHARM REV CODE 636 W HCPCS: Performed by: SURGERY

## 2017-12-21 PROCEDURE — 63600175 PHARM REV CODE 636 W HCPCS: Performed by: NURSE PRACTITIONER

## 2017-12-21 PROCEDURE — 99232 SBSQ HOSP IP/OBS MODERATE 35: CPT | Mod: ,,, | Performed by: INTERNAL MEDICINE

## 2017-12-21 PROCEDURE — 93005 ELECTROCARDIOGRAM TRACING: CPT

## 2017-12-21 PROCEDURE — 94799 UNLISTED PULMONARY SVC/PX: CPT

## 2017-12-21 PROCEDURE — 99223 1ST HOSP IP/OBS HIGH 75: CPT | Mod: ,,, | Performed by: INTERNAL MEDICINE

## 2017-12-21 PROCEDURE — 83735 ASSAY OF MAGNESIUM: CPT

## 2017-12-21 PROCEDURE — 25000003 PHARM REV CODE 250: Performed by: SURGERY

## 2017-12-21 PROCEDURE — 85025 COMPLETE CBC W/AUTO DIFF WBC: CPT

## 2017-12-21 PROCEDURE — 21400001 HC TELEMETRY ROOM

## 2017-12-21 PROCEDURE — 25000003 PHARM REV CODE 250: Performed by: NURSE PRACTITIONER

## 2017-12-21 PROCEDURE — S0028 INJECTION, FAMOTIDINE, 20 MG: HCPCS | Performed by: INTERNAL MEDICINE

## 2017-12-21 RX ORDER — HYDRALAZINE HYDROCHLORIDE 20 MG/ML
10 INJECTION INTRAMUSCULAR; INTRAVENOUS EVERY 8 HOURS PRN
Status: DISCONTINUED | OUTPATIENT
Start: 2017-12-21 | End: 2017-12-21

## 2017-12-21 RX ORDER — HYDRALAZINE HYDROCHLORIDE 20 MG/ML
5 INJECTION INTRAMUSCULAR; INTRAVENOUS EVERY 8 HOURS PRN
Status: DISCONTINUED | OUTPATIENT
Start: 2017-12-21 | End: 2017-12-24 | Stop reason: HOSPADM

## 2017-12-21 RX ADMIN — METRONIDAZOLE 500 MG: 500 INJECTION, SOLUTION INTRAVENOUS at 05:12

## 2017-12-21 RX ADMIN — HYDRALAZINE HYDROCHLORIDE 10 MG: 20 INJECTION INTRAMUSCULAR; INTRAVENOUS at 09:12

## 2017-12-21 RX ADMIN — METRONIDAZOLE 500 MG: 500 INJECTION, SOLUTION INTRAVENOUS at 01:12

## 2017-12-21 RX ADMIN — ENOXAPARIN SODIUM 40 MG: 100 INJECTION SUBCUTANEOUS at 11:12

## 2017-12-21 RX ADMIN — CIPROFLOXACIN 400 MG: 2 INJECTION, SOLUTION INTRAVENOUS at 12:12

## 2017-12-21 RX ADMIN — METOPROLOL TARTRATE 5 MG: 5 INJECTION INTRAVENOUS at 11:12

## 2017-12-21 RX ADMIN — METOPROLOL TARTRATE 5 MG: 5 INJECTION INTRAVENOUS at 05:12

## 2017-12-21 RX ADMIN — METOPROLOL TARTRATE 5 MG: 5 INJECTION INTRAVENOUS at 12:12

## 2017-12-21 RX ADMIN — METOPROLOL TARTRATE 5 MG: 5 INJECTION INTRAVENOUS at 06:12

## 2017-12-21 RX ADMIN — FAMOTIDINE 20 MG: 20 INJECTION, SOLUTION INTRAVENOUS at 12:12

## 2017-12-21 RX ADMIN — CIPROFLOXACIN 400 MG: 2 INJECTION, SOLUTION INTRAVENOUS at 11:12

## 2017-12-21 RX ADMIN — METRONIDAZOLE 500 MG: 500 INJECTION, SOLUTION INTRAVENOUS at 09:12

## 2017-12-21 RX ADMIN — FAMOTIDINE 20 MG: 20 INJECTION, SOLUTION INTRAVENOUS at 08:12

## 2017-12-21 NOTE — HPI
78 yo WM with a PMH of HLD and daily mild etoh consumption.  He presented to [facility] ED last night with complaints of 2 week hx of worsening constant abd pain, abd distention, N/V, loss of appetite, malaise and obstipation of mod to severe severity.  CT Abd in ED showed multiple liver lesions as well as distended small bowel and colon, Na 125.  Surgery consulted and patient taken to OR on 12/18/17 by Dr. Ballard finding severely dilated small bowel was found to be ischemic due to pressure/distension. The entire colon was severely distended, and the malignant stricture was noted at the distal end of descending colon. Liver metastatic lesions were noted and excisional biopsy done.  Final pathology remains pending    Patient remains nothing by mouth with NG tube in place.

## 2017-12-21 NOTE — PROGRESS NOTES
[facility]  Hematology/Oncology  Progress Note    Patient Name: Herb Sumner  Admission Date: 12/18/2017  Hospital Length of Stay: 3 days  Code Status: Full Code     Subjective:     HPI:    78 yo WM with a PMH of HLD and daily mild etoh consumption.  He presented to [facility] ED last night with complaints of 2 week hx of worsening constant abd pain, abd distention, N/V, loss of appetite, malaise and obstipation of mod to severe severity.  CT Abd in ED showed multiple liver lesions as well as distended small bowel and colon, Na 125.  Surgery consulted and patient taken to OR on 12/18/17 by Dr. Ballard finding severely dilated small bowel was found to be ischemic due to pressure/distension. The entire colon was severely distended, and the malignant stricture was noted at the distal end of descending colon. Liver metastatic lesions were noted and excisional biopsy done.  Final pathology remains pending    Patient remains nothing by mouth with NG tube in place.    Oncology Treatment Plan:   [No treatment plan]    Medications:  Continuous Infusions:   sodium chloride 0.9% 50 mL/hr at 12/20/17 2203    Amino acid 5% - dextrose 15% (CLINIMIX-E) solution with additives (1L  provides 510 kcal/L dextrose, with 50 gm AA, 150 gm CHO, Na 35, K 30, Mg 5, Ca 4.5, Acetate 80, Cl 39, Phos 15) 40 mL/hr at 12/20/17 1600     Scheduled Meds:   calcium gluconate IVPB  1,000 mg Intravenous Once    ciprofloxacin  400 mg Intravenous Q12H    enoxparin  40 mg Subcutaneous Q24H    famotidine  20 mg Intravenous BID    lactated ringers  500 mL Intravenous Once    metoprolol  5 mg Intravenous Q6H    metronidazole  500 mg Intravenous Q8H    nozaseptin   Each Nare BID     PRN Meds:dextrose 50%, diphenhydrAMINE, glucagon (human recombinant), insulin aspart, morphine, ondansetron, promethazine     Review of patient's allergies indicates:  No Known Allergies     Past Medical History:   Diagnosis Date    EtOH dependence      "Hyperlipemia     Hyperlipidemia      Past Surgical History:   Procedure Laterality Date    BACK SURGERY       Family History     Problem Relation (Age of Onset)    Stroke Mother        Social History Main Topics    Smoking status: Former Smoker     Packs/day: 2.00     Years: 20.00    Smokeless tobacco: Not on file      Comment: quit 40 years ago    Alcohol use 4.2 oz/week     7 Glasses of wine per week      Comment: whiskey and scotch 2 "jiggers" nightly    Drug use: No    Sexual activity: Not on file       Review of Systems   Constitutional: Positive for activity change, appetite change and unexpected weight change. Negative for chills and fatigue.   HENT: Negative.    Eyes: Negative.    Respiratory: Negative.    Cardiovascular: Negative.    Gastrointestinal: Positive for abdominal distention, abdominal pain and nausea.   Endocrine: Negative.    Genitourinary: Negative.    Skin: Positive for wound (surgical wound without signs of infection). Negative for color change, pallor and rash.   Allergic/Immunologic: Negative.    Neurological: Negative.  Negative for dizziness, tremors, syncope, speech difficulty, weakness and headaches.   Hematological: Negative.    Psychiatric/Behavioral: Negative.      Objective:     Vital Signs (Most Recent):  Temp: 99.6 °F (37.6 °C) (12/21/17 0453)  Pulse: 76 (12/21/17 0453)  Resp: 18 (12/21/17 0453)  BP: 138/62 (12/21/17 0453)  SpO2: 95 % (12/21/17 0700) Vital Signs (24h Range):  Temp:  [98.3 °F (36.8 °C)-99.6 °F (37.6 °C)] 99.6 °F (37.6 °C)  Pulse:  [75-92] 76  Resp:  [14-23] 18  SpO2:  [93 %-97 %] 95 %  BP: (137-183)/(60-99) 138/62     Weight: 76.5 kg (168 lb 10.4 oz)  Body mass index is 28.07 kg/m².  Body surface area is 1.87 meters squared.      Intake/Output Summary (Last 24 hours) at 12/21/17 5952  Last data filed at 12/21/17 0509   Gross per 24 hour   Intake          2824.02 ml   Output             1280 ml   Net          1544.02 ml       Physical Exam   Constitutional: " He is oriented to person, place, and time. He appears well-developed and well-nourished. No distress.   HENT:   Head: Normocephalic and atraumatic.   Nose: Nose normal.   Mouth/Throat: Oropharynx is clear and moist. No oropharyngeal exudate.   NG tube   Eyes: Conjunctivae and EOM are normal. Pupils are equal, round, and reactive to light. Right eye exhibits no discharge. Left eye exhibits no discharge.   Neck: Normal range of motion. Neck supple. No JVD present. No tracheal deviation present. No thyromegaly present.   Cardiovascular: Normal rate and regular rhythm.  Exam reveals no gallop and no friction rub.    No murmur heard.  Pulmonary/Chest: Effort normal. No accessory muscle usage or stridor. No respiratory distress. He has decreased breath sounds. He has no wheezes. He has no rhonchi. He has no rales. He exhibits no tenderness.   Abdominal: Soft. Bowel sounds are normal. He exhibits no distension and no mass. There is no tenderness. There is no rebound and no guarding.   Colostomy   Musculoskeletal: Normal range of motion. He exhibits no edema or tenderness.   Lymphadenopathy:     He has no cervical adenopathy.   Neurological: He is alert and oriented to person, place, and time. No cranial nerve deficit. Coordination normal.   Skin: Skin is warm, dry and intact. Capillary refill takes less than 2 seconds. No abrasion, no bruising, no ecchymosis and no rash noted. He is not diaphoretic. No pallor.   Psychiatric: He has a normal mood and affect. Thought content normal.   Vitals reviewed.      Significant Labs:   CBC:   Recent Labs  Lab 12/20/17  0500 12/21/17  0219   WBC 13.06* 12.21   HGB 12.1* 11.0*   HCT 35.5* 32.5*    222   , CMP:   Recent Labs  Lab 12/19/17  0908 12/19/17  1455 12/20/17  0500 12/21/17  0219   NA  --  133* 135* 137   K  --  5.1 4.4 3.6   CL  --  103 103 102   CO2  --  22* 23 25   GLU  --  168* 117* 135*   BUN  --  26* 20 12   CREATININE  --  0.9 0.7 0.6   CALCIUM  --  8.0* 8.2* 7.8*    ALBUMIN 1.9*  --   --   --    ANIONGAP  --  8 9 10   EGFRNONAA  --  >60 >60 >60   , Immunology: No results for input(s): SPEP, YUSUF, GILMER, FREELAMBDALI in the last 48 hours., LDH: No results for input(s): LDHCSF, BFSOURCE in the last 48 hours. and LFTs:   Recent Labs  Lab 12/19/17  0908   ALBUMIN 1.9*       Diagnostic Results:  I have reviewed all pertinent imaging results/findings within the past 24 hours.    Assessment/Plan:     Malignant neoplasm of descending colon    77-year-old gentleman who presented with bowel obstruction secondary to distal descending colon mass with liver lesions consistent with metastatic colorectal cancer.The patient is status post resection and liver biopsy with final pathology still pending.I had a lengthy discussion with the patient concerning possible diagnosis of colorectal cancer with the understanding of final pathology pending at this time.  The patient and family verbalized their wishes to be evaluated at [facility] after discharge.I assured them that medical records and pathology reports will be available for [facility] evaluation ASAP.  Will check CEA in the morning.    --CEA in the morning  --Follow-up on final pathology  --staging PET to be performed at [facility]            Thank you for your consult. I will follow-up with patient. Please contact us if you have any additional questions.     Sudhir Torres Jr, MD  Hematology/Oncology  [facility]

## 2017-12-21 NOTE — ASSESSMENT & PLAN NOTE
77-year-old gentleman who presented with bowel obstruction secondary to distal descending colon mass with liver lesions consistent with metastatic colorectal cancer.  The patient is status post resection and liver biopsy with final pathology still pending.  I had a lengthy discussion with the patient concerning possible diagnosis of colorectal cancer with the understanding of final pathology pending at this time.  The patient and family verbalized their wishes to be evaluated at [facility] after discharge.  I assured them that medical records and pathology reports will be available for [facility] evaluation ASAP.  Will check CEA in the morning.    --CEA in the morning  --Follow-up on final pathology  --staging PET to be performed at [facility]

## 2017-12-21 NOTE — PROGRESS NOTES
[facility] Hospital Medicine  Progress Note    Patient Name: Herb Sumner  MRN: [id number]  Patient Class: IP- Inpatient   Admission Date: 12/18/2017  Length of Stay: 3 days  Attending Physician: Denzel Mathias MD  Primary Care Provider: Primary Doctor No        Subjective:     Principal Problem:Bowel obstruction    HPI:  Herb Sumner is a 77 year old male who presented to [facility] with reports of a 2 weeks history of worsening abdominal distention, belching, nausea and vomiting. The patient reports that his abdominal distention was initially mild and progressed over the last two weeks. He had some intermittent relief with vegetable laxatives, but symptoms returned. Today, the patient came to the ED because he could not tolerate even water without vomiting. There is associated frequent belching and hiccups. He reports that his intake has gradually decreased since the onset of symptoms. His last bowel movement was 5 days ago and he last passed gas one day ago. In the ED CT scan of the abdomen showed multiple liver lesions as well as distended small bowel and colon. The patient was also found to have hyponatremia with a sodium of 125. Code status was discussed with the patient who is a full code. The patient is unsure of if or when he last had a colonoscopy. His daughter, Kimberly Sumner is his surrogate medical decision maker.     Hospital Course:  12/19 Patient s/p colectomy. Remains intubated. Patient responsive to command can MEYER. Patient denies significant discomfort. ART line inserted. Case discussed with Surgery / Pulmonary CC    12/20 - Patient extubated / communicative. Patient denies complaint. Abdomen remains distended with hypoactive BS. Case discussed with Surgery. Initiate TPN and await return of bowel function. Heme Onc has been consulted for Obstructing Colon mass with apparent liver mets. Bx result pending.    12/21: Patient started on clear liquid diet at noon today.  He ate broth and jello. Denies N/V and abd pain. Stoma pink with colostomy bag in place with moderate amount of brown stool present. Adb remains distended with hypoactive Bs. Daughter at bedside, reporting they have been accepted at [facility] as soon as pt is discharged. Discussed plan with ROBE Pandya from General Surgery.      Interval History: No acute events overnight. Patient started on clear liquid diet at noon today. He ate broth and jello. Denies N/V and abd pain. Stoma pink with colostomy bag in place with moderate amount of brown stool present. Adb remains distended with hypoactive Bs. Discussed plan with ROBE Pandya from General Surgery. Continue current plan of care.      Review of Systems   Constitutional: Positive for activity change. Negative for chills and fever.   HENT: Negative for congestion and sore throat.    Eyes: Negative for pain.   Respiratory: Negative for cough, shortness of breath and wheezing.    Cardiovascular: Negative for chest pain, palpitations and leg swelling.   Gastrointestinal: Positive for abdominal distention. Negative for abdominal pain, constipation, diarrhea, nausea and vomiting.   Endocrine: Negative for polyuria.   Genitourinary: Negative for dysuria and hematuria.   Musculoskeletal: Negative for arthralgias and myalgias.   Skin: Positive for wound (midline to abd - staples clean and dry). Negative for color change.   Allergic/Immunologic: Negative.    Neurological: Negative for dizziness.   Hematological: Negative.    Psychiatric/Behavioral: Negative for agitation, behavioral problems and confusion.     Objective:     Vital Signs (Most Recent):  Temp: 98.5 °F (36.9 °C) (12/21/17 0745)  Pulse: 76 (12/21/17 0745)  Resp: 20 (12/21/17 0745)  BP: (!) 123/58 (12/21/17 0745)  SpO2: (!) 93 % (12/21/17 0745) Vital Signs (24h Range):  Temp:  [98.3 °F (36.8 °C)-99.6 °F (37.6 °C)] 98.5 °F (36.9 °C)  Pulse:  [75-80] 76  Resp:  [14-21] 20  SpO2:  [93 %-96 %] 93 %  BP:  (123-165)/(58-99) 123/58     Weight: 76.5 kg (168 lb 10.4 oz)  Body mass index is 28.07 kg/m².    Intake/Output Summary (Last 24 hours) at 12/21/17 1442  Last data filed at 12/21/17 1400   Gross per 24 hour   Intake           2237.4 ml   Output              580 ml   Net           1657.4 ml      Physical Exam   Constitutional: He is oriented to person, place, and time. He appears well-developed and well-nourished.   HENT:   Head: Normocephalic and atraumatic.   Mouth/Throat: Oropharynx is clear and moist.   Eyes: Conjunctivae and EOM are normal. Pupils are equal, round, and reactive to light.   Neck: Normal range of motion. No JVD present.   Cardiovascular: Normal rate, regular rhythm, normal heart sounds and intact distal pulses.    No murmur heard.  Pulmonary/Chest: Effort normal and breath sounds normal. No respiratory distress. He has no wheezes.   Abdominal: Soft. He exhibits distension.   Hypoactive BS   Musculoskeletal: Normal range of motion. He exhibits no edema.   Neurological: He is alert and oriented to person, place, and time. No cranial nerve deficit.   Skin: Skin is warm and dry. Capillary refill takes 2 to 3 seconds.   Surgical incision to mid abdomen, C/D/I  colonostomy bag to LLQ intact with mod amt of brown stool. Stoma pink. Not tender to palp   Psychiatric: He has a normal mood and affect. His behavior is normal.   Nursing note and vitals reviewed.      Significant Labs:   Recent Lab Results       12/21/17  0508 12/21/17  0219 12/21/17  0117 12/20/17  1824      Anion Gap  10       Baso #  0.01       Basophil%  0.1       BUN, Bld  12       Calcium  7.8(L)       Chloride  102       CO2  25       Creatinine  0.6       Differential Method  Automated       eGFR if   >60       eGFR if non   >60  Comment:  Calculation used to obtain the estimated glomerular filtration  rate (eGFR) is the CKD-EPI equation.          Eos #  0.0       Eosinophil%  0.2       Glucose  135(H)        Gran #  9.2(H)       Gran%  79.9(H)       Hematocrit  32.5(L)       Hemoglobin  11.0(L)       Lymph #  0.9(L)       Lymph%  7.0(L)       Magnesium  1.8       MCH  29.0       MCHC  33.8       MCV  86       Mono #  2.2(H)       Mono%  17.6(H)       MPV  9.2       Platelets  222       POCT Glucose 143(H)  182(H) 139(H)     Potassium  3.6       RBC  3.79(L)       RDW  14.1       Sodium  137       WBC  12.21           All pertinent labs within the past 24 hours have been reviewed.    Significant Imaging: I have reviewed all pertinent imaging results/findings within the past 24 hours.    Assessment/Plan:      * Bowel obstruction    -Concerning for possible obstructing mass.   -Discussed plan of care with Dr. Ballard.   -Keep patient NPO for possible surgery.   -NG tube placement due to persistent vomiting.    12/19 - Patient s/p colectomy. Tolerated procedure awaiting pathology - Likely CA  12/20 - Sx on consult. Path pending. Heme Onc on consult  12/21 - Heme/Onc following.      --CEA in the morning     --Pathology pending     --staging PET to be performed at [facility]          Hyperglycemia, unspecified    12/20 - Controlled / NISS          S/P colostomy    Surgery following  --stoma pink. Colostomy bag draining brown stool.  --12/21: started on clear liquid diet today.            Atrial fibrillation with rapid ventricular response    12/20 - Controlled / Monitor / BB PRN          Small bowel ischemia    S/p rsx POD#1    12/20 - POD # 2 Await resumption of bowel function.   12/21 - POD #3 stool present in colostomy bag. Clear liquid diet initiated          Malignant neoplasm of descending colon    12/19 - Awaiting pathology. Discussed with Family. Plan for Heme Onc  12/20 - Path pending  12/21 - path pending. Heme/Onc following. Pt accepted at [facility] post discharge          Liver masses    -Possibly represent metastasis associated with obstructing mass.   -Further work up will be planned as outpatient, if needed.     12/19 - Bx  pending  12/20 - Bx pending  12.21 - Bx pending          VTE Risk Mitigation         Ordered     enoxaparin injection 40 mg  Every 24 hours (non-standard times)     Route:  Subcutaneous        12/18/17 2325     Reason for No Pharmacological VTE Prophylaxis  Once      12/18/17 2325     Medium Risk of VTE  Once      12/18/17 2325     Place sequential compression device  Until discontinued      12/18/17 1620              OLIVE Anderson-SYLVAIN  Department of Hospital Medicine   [facility]

## 2017-12-21 NOTE — ASSESSMENT & PLAN NOTE
S/p transverse colon resection and colostomy formation.   POD3  Ok to leave out  NG tube. Replace if he has emesis  NPO until bowel function returns.   Continue colostomy care and wound care  Dvt/gi prophylaxis

## 2017-12-21 NOTE — ASSESSMENT & PLAN NOTE
-Possibly represent metastasis associated with obstructing mass.   -Further work up will be planned as outpatient, if needed.     12/19 - Bx pending  12/20 - Bx pending  12.21 - Bx pending

## 2017-12-21 NOTE — PROGRESS NOTES
"Follow up visit with Mr. Sumner for continued colostomy education reinforcement.  Patient's wife present at bedside at this time.  Colostomy pouch intact with no leakage noted. Stoma pink and moist.. Patient states he called the nurses to tell them when pouch needed to be emptied.  I encouraged patient to empty pouch himself next time ( it was just emptied and no stool currently in pouch.) patient stated "I don't need to practice that".  Reinforced education on when and how to empty pouch, and patient demonstrated on clean pouch, but would not attempt on himself.  Also educated on hygiene, diet, and activity. Written education at bedside.  Plan to change pouch tomorrow with patient and daughter at bedside.  Attempted to discuss discharge planning, and patient stated - "I don't need anything, we are going to [facility] at discharge".  Spoke with daughter in [facility], who reiterated discharge plan to go to [facility] as soon as he is discharged. Patient will need assistance from  prior to discharge to order supplies as patient will not receive HH.   "

## 2017-12-21 NOTE — SUBJECTIVE & OBJECTIVE
"Oncology Treatment Plan:   [No treatment plan]    Medications:  Continuous Infusions:   sodium chloride 0.9% 50 mL/hr at 12/20/17 2203    Amino acid 5% - dextrose 15% (CLINIMIX-E) solution with additives (1L  provides 510 kcal/L dextrose, with 50 gm AA, 150 gm CHO, Na 35, K 30, Mg 5, Ca 4.5, Acetate 80, Cl 39, Phos 15) 40 mL/hr at 12/20/17 1600     Scheduled Meds:   calcium gluconate IVPB  1,000 mg Intravenous Once    ciprofloxacin  400 mg Intravenous Q12H    enoxparin  40 mg Subcutaneous Q24H    famotidine  20 mg Intravenous BID    lactated ringers  500 mL Intravenous Once    metoprolol  5 mg Intravenous Q6H    metronidazole  500 mg Intravenous Q8H    nozaseptin   Each Nare BID     PRN Meds:dextrose 50%, diphenhydrAMINE, glucagon (human recombinant), insulin aspart, morphine, ondansetron, promethazine     Review of patient's allergies indicates:  No Known Allergies     Past Medical History:   Diagnosis Date    EtOH dependence     Hyperlipemia     Hyperlipidemia      Past Surgical History:   Procedure Laterality Date    BACK SURGERY       Family History     Problem Relation (Age of Onset)    Stroke Mother        Social History Main Topics    Smoking status: Former Smoker     Packs/day: 2.00     Years: 20.00    Smokeless tobacco: Not on file      Comment: quit 40 years ago    Alcohol use 4.2 oz/week     7 Glasses of wine per week      Comment: whiskey and scotch 2 "jiggers" nightly    Drug use: No    Sexual activity: Not on file       Review of Systems   Constitutional: Positive for activity change, appetite change and unexpected weight change. Negative for chills and fatigue.   HENT: Negative.    Eyes: Negative.    Respiratory: Negative.    Cardiovascular: Negative.    Gastrointestinal: Positive for abdominal distention, abdominal pain and nausea.   Endocrine: Negative.    Genitourinary: Negative.    Skin: Positive for wound (surgical wound without signs of infection). Negative for color change, " pallor and rash.   Allergic/Immunologic: Negative.    Neurological: Negative.  Negative for dizziness, tremors, syncope, speech difficulty, weakness and headaches.   Hematological: Negative.    Psychiatric/Behavioral: Negative.      Objective:     Vital Signs (Most Recent):  Temp: 99.6 °F (37.6 °C) (12/21/17 0453)  Pulse: 76 (12/21/17 0453)  Resp: 18 (12/21/17 0453)  BP: 138/62 (12/21/17 0453)  SpO2: 95 % (12/21/17 0700) Vital Signs (24h Range):  Temp:  [98.3 °F (36.8 °C)-99.6 °F (37.6 °C)] 99.6 °F (37.6 °C)  Pulse:  [75-92] 76  Resp:  [14-23] 18  SpO2:  [93 %-97 %] 95 %  BP: (137-183)/(60-99) 138/62     Weight: 76.5 kg (168 lb 10.4 oz)  Body mass index is 28.07 kg/m².  Body surface area is 1.87 meters squared.      Intake/Output Summary (Last 24 hours) at 12/21/17 0727  Last data filed at 12/21/17 0509   Gross per 24 hour   Intake          2824.02 ml   Output             1280 ml   Net          1544.02 ml       Physical Exam   Constitutional: He is oriented to person, place, and time. He appears well-developed and well-nourished. No distress.   HENT:   Head: Normocephalic and atraumatic.   Nose: Nose normal.   Mouth/Throat: Oropharynx is clear and moist. No oropharyngeal exudate.   NG tube   Eyes: Conjunctivae and EOM are normal. Pupils are equal, round, and reactive to light. Right eye exhibits no discharge. Left eye exhibits no discharge.   Neck: Normal range of motion. Neck supple. No JVD present. No tracheal deviation present. No thyromegaly present.   Cardiovascular: Normal rate and regular rhythm.  Exam reveals no gallop and no friction rub.    No murmur heard.  Pulmonary/Chest: Effort normal. No accessory muscle usage or stridor. No respiratory distress. He has decreased breath sounds. He has no wheezes. He has no rhonchi. He has no rales. He exhibits no tenderness.   Abdominal: Soft. Bowel sounds are normal. He exhibits no distension and no mass. There is no tenderness. There is no rebound and no guarding.    Colostomy   Musculoskeletal: Normal range of motion. He exhibits no edema or tenderness.   Lymphadenopathy:     He has no cervical adenopathy.   Neurological: He is alert and oriented to person, place, and time. No cranial nerve deficit. Coordination normal.   Skin: Skin is warm, dry and intact. Capillary refill takes less than 2 seconds. No abrasion, no bruising, no ecchymosis and no rash noted. He is not diaphoretic. No pallor.   Psychiatric: He has a normal mood and affect. Thought content normal.   Vitals reviewed.      Significant Labs:   CBC:   Recent Labs  Lab 12/20/17  0500 12/21/17  0219   WBC 13.06* 12.21   HGB 12.1* 11.0*   HCT 35.5* 32.5*    222   , CMP:   Recent Labs  Lab 12/19/17  0908 12/19/17  1455 12/20/17  0500 12/21/17  0219   NA  --  133* 135* 137   K  --  5.1 4.4 3.6   CL  --  103 103 102   CO2  --  22* 23 25   GLU  --  168* 117* 135*   BUN  --  26* 20 12   CREATININE  --  0.9 0.7 0.6   CALCIUM  --  8.0* 8.2* 7.8*   ALBUMIN 1.9*  --   --   --    ANIONGAP  --  8 9 10   EGFRNONAA  --  >60 >60 >60   , Immunology: No results for input(s): SPEP, YUSUF, GILMER, FREELAMBDALI in the last 48 hours., LDH: No results for input(s): LDHCSF, BFSOURCE in the last 48 hours. and LFTs:   Recent Labs  Lab 12/19/17  0908   ALBUMIN 1.9*       Diagnostic Results:  I have reviewed all pertinent imaging results/findings within the past 24 hours.

## 2017-12-21 NOTE — PROGRESS NOTES
[facility] -   General Surgery  Progress Note    Subjective:     History of Present Illness:  Mr. Sumner is 77 year-old male was admitted via ED for the management of acute abdomen with a history of obstipation for last several days. He was complaining of severe abdominal distension with pain. He also complained of nausea and vomited few times. He has not been eating for last 4 days and feels very weak. He denies of abdominal surgery. He is also very into the natural herbal medicine to treat his aliments.    Post-Op Info:  Procedure(s) (LRB):  LAPAROTOMY-EXPLORATORY (N/A)  COLOSTOMY (Left)  BIOPSY-LIVER (N/A)  RESECTION-COLON   3 Days Post-Op     Interval History: NG accidentally removed during PT. He denies nausea/vomiting. Abdomen is less distended.     Medications:  Continuous Infusions:   sodium chloride 0.9% 50 mL/hr at 12/20/17 2203    Amino acid 5% - dextrose 15% (CLINIMIX-E) solution with additives (1L  provides 510 kcal/L dextrose, with 50 gm AA, 150 gm CHO, Na 35, K 30, Mg 5, Ca 4.5, Acetate 80, Cl 39, Phos 15) 40 mL/hr at 12/20/17 1600     Scheduled Meds:   calcium gluconate IVPB  1,000 mg Intravenous Once    ciprofloxacin  400 mg Intravenous Q12H    enoxparin  40 mg Subcutaneous Q24H    famotidine  20 mg Intravenous BID    lactated ringers  500 mL Intravenous Once    metoprolol  5 mg Intravenous Q6H    metronidazole  500 mg Intravenous Q8H    nozaseptin   Each Nare BID     PRN Meds:dextrose 50%, diphenhydrAMINE, glucagon (human recombinant), insulin aspart, morphine, ondansetron, promethazine     Review of patient's allergies indicates:  No Known Allergies  Objective:     Vital Signs (Most Recent):  Temp: 98.5 °F (36.9 °C) (12/21/17 0745)  Pulse: 76 (12/21/17 0745)  Resp: 20 (12/21/17 0745)  BP: (!) 123/58 (12/21/17 0745)  SpO2: (!) 93 % (12/21/17 0745) Vital Signs (24h Range):  Temp:  [98.3 °F (36.8 °C)-99.6 °F (37.6 °C)] 98.5 °F (36.9 °C)  Pulse:  [75-80] 76  Resp:  [14-23]  20  SpO2:  [93 %-96 %] 93 %  BP: (123-165)/(58-99) 123/58     Weight: 76.5 kg (168 lb 10.4 oz)  Body mass index is 28.07 kg/m².    Intake/Output - Last 3 Shifts       12/19 0700 - 12/20 0659 12/20 0700 - 12/21 0659 12/21 0700 - 12/22 0659    P.O.  0 0    I.V. (mL/kg) 3235.3 (42.3) 1080.8 (14.1)     Other  425 300    NG/GT 90      IV Piggyback 1000 650     TPN  668.2     Total Intake(mL/kg) 4325.3 (56.5) 2824 (36.9) 300 (3.9)    Urine (mL/kg/hr) 1230 (0.7) 320 (0.2)     Drains 450 (0.2) 830 (0.5)     Stool 200 (0.1) 200 (0.1)     Total Output 1880 1350      Net +2445.3 +1474 +300           Stool Occurrence 1 x 2 x           Physical Exam   Constitutional: He is oriented to person, place, and time. He appears well-developed and well-nourished.   HENT:   Head: Normocephalic and atraumatic.   Eyes: EOM are normal.   Cardiovascular: Normal rate and regular rhythm.    Pulmonary/Chest: Effort normal. No respiratory distress.   Abdominal: Tenderness: appropriatley.   Colostomy viable with small amount of liquid stool in bag. Incision is c/d/i with staples.     Abdomen is less distended today.    Musculoskeletal: Normal range of motion.   Neurological: He is alert and oriented to person, place, and time.   Skin: Skin is warm and dry.   Psychiatric: He has a normal mood and affect. Thought content normal.   Vitals reviewed.      Significant Labs:  CBC:   Recent Labs  Lab 12/21/17  0219   WBC 12.21   RBC 3.79*   HGB 11.0*   HCT 32.5*      MCV 86   MCH 29.0   MCHC 33.8     CMP:   Recent Labs  Lab 12/18/17  0900  12/19/17  0908  12/21/17  0219   *  < >  --   < > 135*   CALCIUM 9.3  < >  --   < > 7.8*   ALBUMIN 3.1*  --  1.9*  --   --    PROT 6.7  --   --   --   --    *  < >  --   < > 137   K 4.0  < >  --   < > 3.6   CO2 23  < >  --   < > 25   CL 89*  < >  --   < > 102   BUN 26*  < >  --   < > 12   CREATININE 0.8  < >  --   < > 0.6   ALKPHOS 80  --   --   --   --    ALT 18  --   --   --   --    AST 28  --   --    --   --    BILITOT 1.0  --   --   --   --    < > = values in this interval not displayed.    Significant Diagnostics:  I have reviewed all pertinent imaging results/findings within the past 24 hours.    Assessment/Plan:     * Bowel obstruction    S/p transverse colon resection and colostomy formation.   POD3  Ok to leave out  NG tube. Replace if he has emesis  Start clear liquids and continue until bowel function returns.   Continue colostomy care and wound care  Dvt/gi prophylaxis            Katelyn Kelly PA-C  General Surgery  [facility]

## 2017-12-21 NOTE — ASSESSMENT & PLAN NOTE
-Concerning for possible obstructing mass.   -Discussed plan of care with Dr. Ballard.   -Keep patient NPO for possible surgery.   -NG tube placement due to persistent vomiting.    12/19 - Patient s/p colectomy. Tolerated procedure awaiting pathology - Likely CA  12/20 - Sx on consult. Path pending. Heme Onc on consult  12/21 - Heme/Onc following.      --CEA in the morning     --Pathology pending     --staging PET to be performed at [facility]

## 2017-12-21 NOTE — ASSESSMENT & PLAN NOTE
12/19 - Awaiting pathology. Discussed with Family. Plan for Heme Onc  12/20 - Path pending  12/21 - path pending. Heme/Onc following. Pt accepted at [facility] post discharge

## 2017-12-21 NOTE — SUBJECTIVE & OBJECTIVE
Interval History: NG accidentally removed during PT. He denies nausea/vomiting. Abdomen is less distended.     Medications:  Continuous Infusions:   sodium chloride 0.9% 50 mL/hr at 12/20/17 2203    Amino acid 5% - dextrose 15% (CLINIMIX-E) solution with additives (1L  provides 510 kcal/L dextrose, with 50 gm AA, 150 gm CHO, Na 35, K 30, Mg 5, Ca 4.5, Acetate 80, Cl 39, Phos 15) 40 mL/hr at 12/20/17 1600     Scheduled Meds:   calcium gluconate IVPB  1,000 mg Intravenous Once    ciprofloxacin  400 mg Intravenous Q12H    enoxparin  40 mg Subcutaneous Q24H    famotidine  20 mg Intravenous BID    lactated ringers  500 mL Intravenous Once    metoprolol  5 mg Intravenous Q6H    metronidazole  500 mg Intravenous Q8H    nozaseptin   Each Nare BID     PRN Meds:dextrose 50%, diphenhydrAMINE, glucagon (human recombinant), insulin aspart, morphine, ondansetron, promethazine     Review of patient's allergies indicates:  No Known Allergies  Objective:     Vital Signs (Most Recent):  Temp: 98.5 °F (36.9 °C) (12/21/17 0745)  Pulse: 76 (12/21/17 0745)  Resp: 20 (12/21/17 0745)  BP: (!) 123/58 (12/21/17 0745)  SpO2: (!) 93 % (12/21/17 0745) Vital Signs (24h Range):  Temp:  [98.3 °F (36.8 °C)-99.6 °F (37.6 °C)] 98.5 °F (36.9 °C)  Pulse:  [75-80] 76  Resp:  [14-23] 20  SpO2:  [93 %-96 %] 93 %  BP: (123-165)/(58-99) 123/58     Weight: 76.5 kg (168 lb 10.4 oz)  Body mass index is 28.07 kg/m².    Intake/Output - Last 3 Shifts       12/19 0700 - 12/20 0659 12/20 0700 - 12/21 0659 12/21 0700 - 12/22 0659    P.O.  0 0    I.V. (mL/kg) 3235.3 (42.3) 1080.8 (14.1)     Other  425 300    NG/GT 90      IV Piggyback 1000 650     TPN  668.2     Total Intake(mL/kg) 4325.3 (56.5) 2824 (36.9) 300 (3.9)    Urine (mL/kg/hr) 1230 (0.7) 320 (0.2)     Drains 450 (0.2) 830 (0.5)     Stool 200 (0.1) 200 (0.1)     Total Output 1880 1350      Net +2445.3 +1474 +300           Stool Occurrence 1 x 2 x           Physical Exam   Constitutional: He is  oriented to person, place, and time. He appears well-developed and well-nourished.   HENT:   Head: Normocephalic and atraumatic.   Eyes: EOM are normal.   Cardiovascular: Normal rate and regular rhythm.    Pulmonary/Chest: Effort normal. No respiratory distress.   Abdominal: Tenderness: appropriatley.   Colostomy viable with small amount of liquid stool in bag. Incision is c/d/i with staples.     Abdomen is less distended today.    Musculoskeletal: Normal range of motion.   Neurological: He is alert and oriented to person, place, and time.   Skin: Skin is warm and dry.   Psychiatric: He has a normal mood and affect. Thought content normal.   Vitals reviewed.      Significant Labs:  CBC:   Recent Labs  Lab 12/21/17  0219   WBC 12.21   RBC 3.79*   HGB 11.0*   HCT 32.5*      MCV 86   MCH 29.0   MCHC 33.8     CMP:   Recent Labs  Lab 12/18/17  0900  12/19/17  0908  12/21/17  0219   *  < >  --   < > 135*   CALCIUM 9.3  < >  --   < > 7.8*   ALBUMIN 3.1*  --  1.9*  --   --    PROT 6.7  --   --   --   --    *  < >  --   < > 137   K 4.0  < >  --   < > 3.6   CO2 23  < >  --   < > 25   CL 89*  < >  --   < > 102   BUN 26*  < >  --   < > 12   CREATININE 0.8  < >  --   < > 0.6   ALKPHOS 80  --   --   --   --    ALT 18  --   --   --   --    AST 28  --   --   --   --    BILITOT 1.0  --   --   --   --    < > = values in this interval not displayed.    Significant Diagnostics:  I have reviewed all pertinent imaging results/findings within the past 24 hours.

## 2017-12-21 NOTE — PT/OT/SLP EVAL
Physical Therapy Evaluation    Patient Name:  Herb Sumner   MRN:  [id number]    Recommendations:     Discharge Recommendations:  nursing facility, skilled, home health PT   Discharge Equipment Recommendations:     Barriers to discharge: None    Assessment:     Herb Sumner is a 77 y.o. male admitted with a medical diagnosis of Bowel obstruction.  He presents with the following impairments/functional limitations:  impaired endurance, impaired functional mobilty, decreased lower extremity function, gait instability, pain, decreased safety awareness, decreased coordination, impaired balance, impaired self care skills, weakness.    Rehab Prognosis:  GOOD; patient would benefit from acute skilled PT services to address these deficits and reach maximum level of function.      Recent Surgery: Procedure(s) (LRB):  LAPAROTOMY-EXPLORATORY (N/A)  COLOSTOMY (Left)  BIOPSY-LIVER (N/A)  RESECTION-COLON 3 Days Post-Op    Plan:     During this hospitalization, patient to be seen 5 x/week to address the above listed problems via gait training, therapeutic activities, therapeutic exercises  · Plan of Care Expires:      Plan of Care Reviewed with: patient    Subjective     Communicated with NURSE prior to session.  Patient found LYING IN BED upon PT entry to room, agreeable to evaluation.      Chief Complaint: WEAKNESS/SORENESS  Patient comments/goals: GET STRONGER AND GO HOME  Pain/Comfort:  · Pain Rating 1: 5/10  · Location 1: abdomen  · Pain Addressed 1: Reposition  · Pain Rating Post-Intervention 1: 1/10    Patients cultural, spiritual, Rastafarian conflicts given the current situation:      Living Environment:  PATIENT LIVES WITH WIFE - NO STEPS TO ENTER HOME  Prior to admission, patients level of function was INDEP.  Patient has the following equipment: none.  DME owned (not currently used): none.  Upon discharge, patient will have assistance from WIFE.    Objective:     Patient found with: blood pressure cuff, NG tube,  peripheral IV, telemetry, SCD, pulse ox (continuous)     General Precautions: Standard, fall   Orthopedic Precautions:N/A   Braces: N/A     Exams: B LE ROM WFL; Strength 3+/5 grossly    Functional Mobility:  · Pt. Came sup to/from sit mod A, sit to stand min/mod A and handheld A, then amb 4 steps to/from chair min/mod a and handheld A.    AM-PAC 6 CLICK MOBILITY  Total Score:14       Therapeutic Activities and Exercises:   PT educated on b le exs to do in bed to dec stiffness, poc, and safety with mobility/up with assist only.    Patient left supine with all lines intact, call button in reach and nurse notified.    GOALS:    Physical Therapy Goals        Problem: Physical Therapy Goal    Goal Priority Disciplines Outcome Goal Variances Interventions   Physical Therapy Goal     PT/OT, PT      Description:  STG'S FOR PT BY 12/27/17  1. PATIENT WILL COME SUPINE TO SIT SBA  2. PATIENT WILL COME SIT TO STAND SBA  3. PATIENT WILL AMB LEAST AD 150FT RW PRN SBA                    History:     Past Medical History:   Diagnosis Date    EtOH dependence     Hyperlipemia     Hyperlipidemia        Past Surgical History:   Procedure Laterality Date    BACK SURGERY         Clinical Decision Making:     History  Co-morbidities and personal factors that may impact the plan of care Examination  Body Structures and Functions, activity limitations and participation restrictions that may impact the plan of care Clinical Presentation   Decision Making/ Complexity Score   Co-morbidities:   [] Time since onset of injury / illness / exacerbation  [] Status of current condition  []Patient's cognitive status and safety concerns    [] Multiple Medical Problems (see med hx)  Personal Factors:   [] Patient's age  [] Prior Level of function   [] Patient's home situation (environment and family support)  [] Patient's level of motivation  [] Expected progression of patient      HISTORY:(criteria)    [] 18531 - no personal factors/history    []  17939 - has 1-2 personal factor/comorbidity     [] 67549 - has >3 personal factor/comorbidity     Body Regions:  [] Objective examination findings  [] Head     []  Neck  [] Trunk   [] Upper Extremity  [] Lower Extremity    Body Systems:  [] For communication ability, affect, cognition, language, and learning style: the assessment of the ability to make needs known, consciousness, orientation (person, place, and time), expected emotional /behavioral responses, and learning preferences (eg, learning barriers, education  needs)  [] For the neuromuscular system: a general assessment of gross coordinated movement (eg, balance, gait, locomotion, transfers, and transitions) and motor function  (motor control and motor learning)  [] For the musculoskeletal system: the assessment of gross symmetry, gross range of motion, gross strength, height, and weight  [] For the integumentary system: the assessment of pliability(texture), presence of scar formation, skin color, and skin integrity  [] For cardiovascular/pulmonary system: the assessment of heart rate, respiratory rate, blood pressure, and edema     Activity limitations:    [] Patient's cognitive status and saf ety concerns          [] Status of current condition      [] Weight bearing restriction  [] Cardiopulmunary Restriction    Participation Restrictions:   [] Goals and goal agreement with the patient     [] Rehab potential (prognosis) and probable outcome      Examination of Body System: (criteria)    [] 83664 - addressing 1-2 elements    [] 95476 - addressing a total of 3 or more elements     [] 35659 -  Addressing a total of 4 or more elements         Clinical Presentation: (criteria)  Choose one     On examination of body system using standardized tests and measures patient presents with (CHOOSE ONE) elements from any of the following: body structures and functions, activity limitations, and/or participation restrictions.  Leading to a clinical presentation that is  considered (CHOOSE ONE)                              Clinical Decision Making  (Eval Complexity):  Choose One     Time Tracking:     PT Received On: 12/20/17  PT Start Time: 1345     PT Stop Time: 1355  PT Total Time (min): 10 min     Billable Minutes: Evaluation 10      Hema Gooden, PT  12/21/2017

## 2017-12-21 NOTE — PLAN OF CARE
Problem: Patient Care Overview  Goal: Plan of Care Review  Outcome: Ongoing (interventions implemented as appropriate)  Respirations even and unlabored, no distress noted on assessment, no pain nausea or shortness of breath, right nare NG tube to low wall suction, daughter at bedside, left quad colostomy, bed alarm

## 2017-12-21 NOTE — CONSULTS
Consult received from wound care nurse to assist patient with obtaining ostomy supplies. Met with patient and daughter. Patient's daughter stated that they will be discharging directly to [facility] and that she will need enough ostomy supplies to last . Gave her the resource for assistance in obtaining ostomy supplies with additional suppliers for the uninsured. Patient's daughter specifically asked for the actual supplies to last a week. She is expecting to be admitted to [facility] . Ostomy supplies ( 1 box one piece pouch , Brava Stoma ring, and Cavilon spray. ) given to the patient and his daughter. Spoke with Amy regarding the paste. Amy to give the paste. Update to Hazel Montejo NP.

## 2017-12-21 NOTE — PLAN OF CARE
Problem: Patient Care Overview  Goal: Plan of Care Review  Outcome: Ongoing (interventions implemented as appropriate)  PT PROGRESSING WITH GT X 150' WITH RW AND CGA

## 2017-12-21 NOTE — ASSESSMENT & PLAN NOTE
Surgery following  --stoma pink. Colostomy bag draining brown stool.  --12/21: started on clear liquid diet today.

## 2017-12-21 NOTE — PROGRESS NOTES
Cardiology Progress Note        SUBJECTIVE:     History of Present Illness:  78 yo male, came in for SBO. Had emergent surgery last night, finding colon CA, colon resection, colostomy with metastatic liver biopsy.  Now intubated.  His first EKG in ER was on sinus and then developed Afib with RVR. Overnight, had digoxin ivp and cardizem IVP and gtt at 5 mg. Now sinus rhythm at 70's      OBJECTIVE:     Vital Signs (Most Recent)  Temp: 98.3 °F (36.8 °C) (12/20/17 1712)  Pulse: 80 (12/20/17 1712)  Resp: 19 (12/20/17 1712)  BP: (!) 165/76 (12/20/17 1712)  SpO2: (!) 94 % (12/20/17 1712)    Vital Signs Range (Last 24H):  Temp:  [97.5 °F (36.4 °C)-98.8 °F (37.1 °C)]   Pulse:  [75-94]   Resp:  [14-24]   BP: ()/(34-99)   SpO2:  [94 %-100 %]   Arterial Line BP: (153-175)/(55-64)     Intake/Output last 3 shifts:  I/O last 3 completed shifts:  In: 5544.3 [I.V.:3866.2; Other:200; NG/GT:90; IV Piggyback:1200]  Out: 2950 [Urine:1400; Drains:1150; Stool:400]    Intake/Output this shift:  No intake/output data recorded.    Review of patient's allergies indicates:  No Known Allergies    Current Facility-Administered Medications   Medication    0.9%  NaCl infusion    Amino acid 5% - dextrose 15% (CLINIMIX-E) solution with additives.  **Use as a non-renal starter bag, central infusion only (1395 mOsm/L). 1L provides 50 gm AA, 150 gm CHO (510 kcal/L dextrose), Na 35, K 30, Mg 5, Ca 4.5, Acetate 80, Cl 39, Phos 15.    calcium gluconate 1,000 mg in dextrose 5 % 100 mL IVPB    ciprofloxacin (CIPRO)400mg/200ml D5W IVPB 400 mg    dextrose 50% injection 12.5 g    diphenhydrAMINE injection 25 mg    enoxaparin injection 40 mg    famotidine IVPB 20 mg    fat emulsion 20% infusion 250 mL    glucagon (human recombinant) injection 1 mg    insulin aspart pen 1-10 Units    lactated ringers bolus 500 mL    metoprolol injection 5 mg    metronidazole IVPB 500 mg    morphine injection 2 mg    nozaseptin (NOZIN) nasal      ondansetron injection 4 mg    promethazine suppository 25 mg     No current facility-administered medications on file prior to encounter.      No current outpatient prescriptions on file prior to encounter.       Physical Exam:  General appearance: alert, appears stated age and cooperative  Head: Normocephalic, without obvious abnormality, atraumatic  Eyes:  conjunctivae/corneas clear. PERRL, EOM's intact. Fundi benign.  Nose: no discharge  Throat: normal findings: tongue midline and normal  Neck: no adenopathy, no carotid bruit, no JVD, supple, symmetrical, trachea midline and thyroid not enlarged, symmetric, no tenderness/mass/nodules  Back:  no skin lesions, erythema, or scars  Lungs:  clear to auscultation bilaterally  Chest wall: no tenderness  Heart: regular rate and rhythm, S1, S2 normal, no murmur, click, rub or gallop  Abdomen: soft, non-tender; bowel sounds normal; no masses,  no organomegaly  Extremities: extremities normal, atraumatic, no cyanosis or edema  Pulses: 2+ and symmetric  Skin: Skin color, texture, turgor normal. No rashes or lesions  Neurologic: Grossly normal    Laboratory:  Chemistry:   Lab Results   Component Value Date     (L) 12/20/2017    K 4.4 12/20/2017     12/20/2017    CO2 23 12/20/2017    BUN 20 12/20/2017    CREATININE 0.7 12/20/2017    CALCIUM 8.2 (L) 12/20/2017     Cardiac Markers:   Lab Results   Component Value Date    TROPONINI 0.012 12/18/2017     Cardiac Markers (Last 3):   Lab Results   Component Value Date    TROPONINI 0.012 12/18/2017     CBC:   Lab Results   Component Value Date    WBC 13.06 (H) 12/20/2017    HGB 12.1 (L) 12/20/2017    HCT 35.5 (L) 12/20/2017    MCV 86 12/20/2017     12/20/2017     Lipids: No results found for: CHOL, TRIG, HDL, LDLDIRECT  Coagulation:   Lab Results   Component Value Date    INR 1.2 12/18/2017    APTT 28.0 12/18/2017       Diagnostic Results:  ECG: Reviewed  X-Ray: Reviewed  US: Reviewed  CT: Reviewed  Echo:  Reviewed      ASSESSMENT/PLAN:     Patient Active Problem List   Diagnosis    Bowel obstruction    Liver masses    Malignant neoplasm of descending colon    Small bowel ischemia    Atrial fibrillation with rapid ventricular response    S/P colostomy    Hyperglycemia, unspecified      PAF with RVR, now sinus rhythm  SBO with colon cancer and liver metastasis,. S/p resection of colon ,colostomy, liver biposy, POD#1  HTN       Plan:  Continue metoprolol 5 mg ivp prn for PAF.  Will add NOAC if having recurrent PAF.  Continue supportive care

## 2017-12-21 NOTE — PLAN OF CARE
Problem: Patient Care Overview  Goal: Plan of Care Review  Outcome: Ongoing (interventions implemented as appropriate)  Plan of care reviewed and discussed with patient.  No acute distress noted.  Safety and fall precautions reviewed and discussed with patient.  Patient free from injury.  Denies pain.  Colostomy care given.  Oral hydration and ambulation promoted.  Call bell and personal items in reach.  Bed in low position and locked.  Side rails up x2.  Encouraged to call if any assistance needed.  Will continue to monitor.

## 2017-12-21 NOTE — PT/OT/SLP PROGRESS
"Physical Therapy  Treatment    Hebr Sumner   MRN: [id number]   Admitting Diagnosis: Bowel obstruction    PT Received On: 12/21/17  PT Start Time: 0906     PT Stop Time: 0930    PT Total Time (min): 24 min       Billable Minutes:  Gait Training 14 and Therapeutic Activity 10    Treatment Type: Treatment  PT/PTA: PT             General Precautions: Standard, fall  Orthopedic Precautions: N/A   Braces:           Subjective:  Communicated with nurse Cyndie and epic chart review prior to session.  PT AGREED TO TX. PT CONT ASKING TO HAVE NG REMOVED. P.T. REPORTED IT COULD NOT BE REMOVED AND CALLED NURSE TO [facility] FOR GT TRAINING.     Pain/Comfort  Pain Rating 1: 5/10  Location 1: nose  Pain Rating Post-Intervention 1: 5/10    Objective:   Patient found with: telemetry, peripheral IV, NG tube    Functional Mobility:  PT SUP>SIT EOB WITH MIN A. PT STOOD WITH RW AND MIN A FOR GT TO BATHROOM WITH RW AND CGA. PT SEATED ON COMMODE. PT FALLED FOR P.T. IN BATHROOM. PT HOLDING NG TUBE OUT OF NOSE IN HAND AND REPORTED, " IT CAME OUT." P.T. CALLED FOR NURSE. PT GT TRAINED X 150' WITH RW AND STEP TO GT WITH CGA. PT RETURNED TO RM SEATED IN CHAIR AND LEFT WITH ALL NEEDS MET.      AM-PAC 6 CLICK MOBILITY  How much help from another person does this patient currently need?   1 = Unable, Total/Dependent Assistance  2 = A lot, Maximum/Moderate Assistance  3 = A little, Minimum/Contact Guard/Supervision  4 = None, Modified [place]/Independent    Turning over in bed (including adjusting bedclothes, sheets and blankets)?: 3  Sitting down on and standing up from a chair with arms (e.g., wheelchair, bedside commode, etc.): 3  Moving from lying on back to sitting on the side of the bed?: 3  Moving to and from a bed to a chair (including a wheelchair)?: 3  Need to walk in hospital room?: 3  Climbing 3-5 steps with a railing?: 1  Total Score: 16    AM-PAC Raw Score CMS G-Code Modifier Level of Impairment Assistance   6 % Total / " Unable   7 - 9 CM 80 - 100% Maximal Assist   10 - 14 CL 60 - 80% Moderate Assist   15 - 19 CK 40 - 60% Moderate Assist   20 - 22 CJ 20 - 40% Minimal Assist   23 CI 1-20% SBA / CGA   24 CH 0% Independent/ Mod I     Patient left up in chair with call button in reach.    Assessment:  PT PROGRESSING WITH GT    Rehab identified problem list/impairments: Rehab identified problem list/impairments: weakness, impaired endurance, impaired self care skills, gait instability, impaired functional mobilty, impaired balance, decreased lower extremity function, pain, decreased safety awareness    Rehab potential is good.    Activity tolerance: Good    Discharge recommendations: Discharge Facility/Level Of Care Needs: home health PT     Barriers to discharge:      Equipment recommendations:       GOALS:    Physical Therapy Goals        Problem: Physical Therapy Goal    Goal Priority Disciplines Outcome Goal Variances Interventions   Physical Therapy Goal     PT/OT, PT      Description:  STG'S FOR PT BY 12/27/17  1. PATIENT WILL COME SUPINE TO SIT SBA  2. PATIENT WILL COME SIT TO STAND SBA  3. PATIENT WILL AMB LEAST AD 150FT RW PRN SBA                    PLAN:    Patient to be seen 5 x/week  to address the above listed problems via gait training, therapeutic activities, therapeutic exercises  Plan of Care expires: 12/26/17  Plan of Care reviewed with: patient         Eleni Oliva, PT  12/21/2017

## 2017-12-21 NOTE — SUBJECTIVE & OBJECTIVE
Interval History: No acute events overnight. Patient started on clear liquid diet at noon today. He ate broth and jello. Denies N/V and abd pain. Stoma pink with colostomy bag in place with moderate amount of brown stool present. Adb remains distended with hypoactive Bs. Discussed plan with ROBE Pandya from General Surgery. Continue current plan of care.      Review of Systems   Constitutional: Positive for activity change. Negative for chills and fever.   HENT: Negative for congestion and sore throat.    Eyes: Negative for pain.   Respiratory: Negative for cough, shortness of breath and wheezing.    Cardiovascular: Negative for chest pain, palpitations and leg swelling.   Gastrointestinal: Positive for abdominal distention. Negative for abdominal pain, constipation, diarrhea, nausea and vomiting.   Endocrine: Negative for polyuria.   Genitourinary: Negative for dysuria and hematuria.   Musculoskeletal: Negative for arthralgias and myalgias.   Skin: Positive for wound (midline to abd - staples clean and dry). Negative for color change.   Allergic/Immunologic: Negative.    Neurological: Negative for dizziness.   Hematological: Negative.    Psychiatric/Behavioral: Negative for agitation, behavioral problems and confusion.     Objective:     Vital Signs (Most Recent):  Temp: 98.5 °F (36.9 °C) (12/21/17 0745)  Pulse: 76 (12/21/17 0745)  Resp: 20 (12/21/17 0745)  BP: (!) 123/58 (12/21/17 0745)  SpO2: (!) 93 % (12/21/17 0745) Vital Signs (24h Range):  Temp:  [98.3 °F (36.8 °C)-99.6 °F (37.6 °C)] 98.5 °F (36.9 °C)  Pulse:  [75-80] 76  Resp:  [14-21] 20  SpO2:  [93 %-96 %] 93 %  BP: (123-165)/(58-99) 123/58     Weight: 76.5 kg (168 lb 10.4 oz)  Body mass index is 28.07 kg/m².    Intake/Output Summary (Last 24 hours) at 12/21/17 1442  Last data filed at 12/21/17 1400   Gross per 24 hour   Intake           2237.4 ml   Output              580 ml   Net           1657.4 ml      Physical Exam   Constitutional: He is oriented  to person, place, and time. He appears well-developed and well-nourished.   HENT:   Head: Normocephalic and atraumatic.   Mouth/Throat: Oropharynx is clear and moist.   Eyes: Conjunctivae and EOM are normal. Pupils are equal, round, and reactive to light.   Neck: Normal range of motion. No JVD present.   Cardiovascular: Normal rate, regular rhythm, normal heart sounds and intact distal pulses.    No murmur heard.  Pulmonary/Chest: Effort normal and breath sounds normal. No respiratory distress. He has no wheezes.   Abdominal: Soft. He exhibits distension.   Hypoactive BS   Musculoskeletal: Normal range of motion. He exhibits no edema.   Neurological: He is alert and oriented to person, place, and time. No cranial nerve deficit.   Skin: Skin is warm and dry. Capillary refill takes 2 to 3 seconds.   Surgical incision to mid abdomen, C/D/I  colonostomy bag to LLQ intact with mod amt of brown stool. Stoma pink. Not tender to palp   Psychiatric: He has a normal mood and affect. His behavior is normal.   Nursing note and vitals reviewed.      Significant Labs:   Recent Lab Results       12/21/17  0508 12/21/17  0219 12/21/17  0117 12/20/17  1824      Anion Gap  10       Baso #  0.01       Basophil%  0.1       BUN, Bld  12       Calcium  7.8(L)       Chloride  102       CO2  25       Creatinine  0.6       Differential Method  Automated       eGFR if   >60       eGFR if non   >60  Comment:  Calculation used to obtain the estimated glomerular filtration  rate (eGFR) is the CKD-EPI equation.          Eos #  0.0       Eosinophil%  0.2       Glucose  135(H)       Gran #  9.2(H)       Gran%  79.9(H)       Hematocrit  32.5(L)       Hemoglobin  11.0(L)       Lymph #  0.9(L)       Lymph%  7.0(L)       Magnesium  1.8       MCH  29.0       MCHC  33.8       MCV  86       Mono #  2.2(H)       Mono%  17.6(H)       MPV  9.2       Platelets  222       POCT Glucose 143(H)  182(H) 139(H)     Potassium  3.6        RBC  3.79(L)       RDW  14.1       Sodium  137       WBC  12.21           All pertinent labs within the past 24 hours have been reviewed.    Significant Imaging: I have reviewed all pertinent imaging results/findings within the past 24 hours.

## 2017-12-21 NOTE — ASSESSMENT & PLAN NOTE
S/p rsx POD#1    12/20 - POD # 2 Await resumption of bowel function.   12/21 - POD #3 stool present in colostomy bag. Clear liquid diet initiated

## 2017-12-22 LAB
ANION GAP SERPL CALC-SCNC: 8 MMOL/L
ANISOCYTOSIS BLD QL SMEAR: SLIGHT
BASOPHILS # BLD AUTO: ABNORMAL K/UL
BASOPHILS NFR BLD: 1 %
BUN SERPL-MCNC: 9 MG/DL
CALCIUM SERPL-MCNC: 8.1 MG/DL
CEA SERPL-MCNC: 482 NG/ML
CHLORIDE SERPL-SCNC: 105 MMOL/L
CO2 SERPL-SCNC: 24 MMOL/L
CREAT SERPL-MCNC: 0.6 MG/DL
DIFFERENTIAL METHOD: ABNORMAL
EOSINOPHIL # BLD AUTO: ABNORMAL K/UL
EOSINOPHIL NFR BLD: 0 %
ERYTHROCYTE [DISTWIDTH] IN BLOOD BY AUTOMATED COUNT: 14.1 %
EST. GFR  (AFRICAN AMERICAN): >60 ML/MIN/1.73 M^2
EST. GFR  (NON AFRICAN AMERICAN): >60 ML/MIN/1.73 M^2
GLUCOSE SERPL-MCNC: 98 MG/DL
HCT VFR BLD AUTO: 32.6 %
HGB BLD-MCNC: 11.1 G/DL
LYMPHOCYTES # BLD AUTO: ABNORMAL K/UL
LYMPHOCYTES NFR BLD: 9 %
MAGNESIUM SERPL-MCNC: 1.8 MG/DL
MCH RBC QN AUTO: 29 PG
MCHC RBC AUTO-ENTMCNC: 34 G/DL
MCV RBC AUTO: 85 FL
MONOCYTES # BLD AUTO: ABNORMAL K/UL
MONOCYTES NFR BLD: 9 %
NEUTROPHILS NFR BLD: 81 %
PLATELET # BLD AUTO: 226 K/UL
PMV BLD AUTO: 9 FL
POCT GLUCOSE: 103 MG/DL (ref 70–110)
POCT GLUCOSE: 117 MG/DL (ref 70–110)
POCT GLUCOSE: 123 MG/DL (ref 70–110)
POLYCHROMASIA BLD QL SMEAR: ABNORMAL
POTASSIUM SERPL-SCNC: 3.5 MMOL/L
RBC # BLD AUTO: 3.83 M/UL
SODIUM SERPL-SCNC: 137 MMOL/L
WBC # BLD AUTO: 10.56 K/UL

## 2017-12-22 PROCEDURE — 25000003 PHARM REV CODE 250: Performed by: NURSE PRACTITIONER

## 2017-12-22 PROCEDURE — 25000003 PHARM REV CODE 250: Performed by: SURGERY

## 2017-12-22 PROCEDURE — 83735 ASSAY OF MAGNESIUM: CPT

## 2017-12-22 PROCEDURE — 94799 UNLISTED PULMONARY SVC/PX: CPT

## 2017-12-22 PROCEDURE — 99900035 HC TECH TIME PER 15 MIN (STAT)

## 2017-12-22 PROCEDURE — 21400001 HC TELEMETRY ROOM

## 2017-12-22 PROCEDURE — 63600175 PHARM REV CODE 636 W HCPCS: Performed by: SURGERY

## 2017-12-22 PROCEDURE — 99232 SBSQ HOSP IP/OBS MODERATE 35: CPT | Mod: ,,, | Performed by: INTERNAL MEDICINE

## 2017-12-22 PROCEDURE — 93010 ELECTROCARDIOGRAM REPORT: CPT | Mod: ,,, | Performed by: INTERNAL MEDICINE

## 2017-12-22 PROCEDURE — 82378 CARCINOEMBRYONIC ANTIGEN: CPT

## 2017-12-22 PROCEDURE — 97530 THERAPEUTIC ACTIVITIES: CPT

## 2017-12-22 PROCEDURE — 85007 BL SMEAR W/DIFF WBC COUNT: CPT

## 2017-12-22 PROCEDURE — 94761 N-INVAS EAR/PLS OXIMETRY MLT: CPT

## 2017-12-22 PROCEDURE — 93005 ELECTROCARDIOGRAM TRACING: CPT

## 2017-12-22 PROCEDURE — S0028 INJECTION, FAMOTIDINE, 20 MG: HCPCS | Performed by: INTERNAL MEDICINE

## 2017-12-22 PROCEDURE — 85027 COMPLETE CBC AUTOMATED: CPT

## 2017-12-22 PROCEDURE — 25000003 PHARM REV CODE 250: Performed by: INTERNAL MEDICINE

## 2017-12-22 PROCEDURE — S0030 INJECTION, METRONIDAZOLE: HCPCS | Performed by: SURGERY

## 2017-12-22 PROCEDURE — 80048 BASIC METABOLIC PNL TOTAL CA: CPT

## 2017-12-22 RX ADMIN — METOPROLOL TARTRATE 5 MG: 5 INJECTION INTRAVENOUS at 05:12

## 2017-12-22 RX ADMIN — METOPROLOL TARTRATE 5 MG: 5 INJECTION INTRAVENOUS at 12:12

## 2017-12-22 RX ADMIN — FAMOTIDINE 20 MG: 20 INJECTION, SOLUTION INTRAVENOUS at 08:12

## 2017-12-22 RX ADMIN — METRONIDAZOLE 500 MG: 500 INJECTION, SOLUTION INTRAVENOUS at 01:12

## 2017-12-22 RX ADMIN — METRONIDAZOLE 500 MG: 500 INJECTION, SOLUTION INTRAVENOUS at 05:12

## 2017-12-22 RX ADMIN — METRONIDAZOLE 500 MG: 500 INJECTION, SOLUTION INTRAVENOUS at 09:12

## 2017-12-22 RX ADMIN — CIPROFLOXACIN 400 MG: 2 INJECTION, SOLUTION INTRAVENOUS at 12:12

## 2017-12-22 RX ADMIN — SODIUM CHLORIDE: 0.9 INJECTION, SOLUTION INTRAVENOUS at 03:12

## 2017-12-22 RX ADMIN — FAMOTIDINE 20 MG: 20 INJECTION, SOLUTION INTRAVENOUS at 09:12

## 2017-12-22 NOTE — PROGRESS NOTES
Cardiology Progress Note        SUBJECTIVE:     History of Present Illness:  78 yo male, came in for SBO. S/p emergent surgery  finding colon CA, colon resection, colostomy with metastatic liver biopsy.  Now intubated.  His first EKG in ER was on sinus and then developed Afib with RVR.     Still has intermittent AFIB, on metoprolol 5 mg iv p q 6 hours    OBJECTIVE:     Vital Signs (Most Recent)  Temp: 98.5 °F (36.9 °C) (12/21/17 1632)  Pulse: 82 (12/21/17 1632)  Resp: 18 (12/21/17 1632)  BP: 126/60 (12/21/17 1632)  SpO2: 97 % (12/21/17 1632)    Vital Signs Range (Last 24H):  Temp:  [98.5 °F (36.9 °C)-99.6 °F (37.6 °C)]   Pulse:  [75-82]   Resp:  [18-20]   BP: (123-148)/(58-87)   SpO2:  [93 %-97 %]     Intake/Output last 3 shifts:  I/O last 3 completed shifts:  In: 4196.2 [I.V.:1755; Other:425; IV Piggyback:1000]  Out: 1630 [Urine:250; Drains:830; Stool:550]    Intake/Output this shift:  No intake/output data recorded.    Review of patient's allergies indicates:  No Known Allergies    Current Facility-Administered Medications   Medication    0.9%  NaCl infusion    calcium gluconate 1,000 mg in dextrose 5 % 100 mL IVPB    ciprofloxacin (CIPRO)400mg/200ml D5W IVPB 400 mg    dextrose 50% injection 12.5 g    diphenhydrAMINE injection 25 mg    enoxaparin injection 40 mg    famotidine IVPB 20 mg    glucagon (human recombinant) injection 1 mg    insulin aspart pen 1-10 Units    lactated ringers bolus 500 mL    metoprolol injection 5 mg    metronidazole IVPB 500 mg    morphine injection 2 mg    nozaseptin (NOZIN) nasal     ondansetron injection 4 mg    promethazine suppository 25 mg     No current facility-administered medications on file prior to encounter.      No current outpatient prescriptions on file prior to encounter.       Physical Exam:  General appearance: alert, appears stated age and cooperative  Head: Normocephalic, without obvious abnormality, atraumatic  Eyes:  conjunctivae/corneas  clear. PERRL, EOM's intact. Fundi benign.  Nose: no discharge  Throat: normal findings: tongue midline and normal  Neck: no adenopathy, no carotid bruit, no JVD, supple, symmetrical, trachea midline and thyroid not enlarged, symmetric, no tenderness/mass/nodules  Back:  no skin lesions, erythema, or scars  Lungs:  clear to auscultation bilaterally  Chest wall: no tenderness  Heart: regular rate and rhythm, S1, S2 normal, no murmur, click, rub or gallop  Abdomen: soft, non-tender; bowel sounds normal; no masses,  no organomegaly  Extremities: extremities normal, atraumatic, no cyanosis or edema  Pulses: 2+ and symmetric  Skin: Skin color, texture, turgor normal. No rashes or lesions  Neurologic: Grossly normal    Laboratory:  Chemistry:   Lab Results   Component Value Date     12/21/2017    K 3.6 12/21/2017     12/21/2017    CO2 25 12/21/2017    BUN 12 12/21/2017    CREATININE 0.6 12/21/2017    CALCIUM 7.8 (L) 12/21/2017     Cardiac Markers:   Lab Results   Component Value Date    TROPONINI 0.012 12/18/2017     Cardiac Markers (Last 3):   Lab Results   Component Value Date    TROPONINI 0.012 12/18/2017     CBC:   Lab Results   Component Value Date    WBC 12.21 12/21/2017    HGB 11.0 (L) 12/21/2017    HCT 32.5 (L) 12/21/2017    MCV 86 12/21/2017     12/21/2017     Lipids: No results found for: CHOL, TRIG, HDL, LDLDIRECT  Coagulation:   Lab Results   Component Value Date    INR 1.2 12/18/2017    APTT 28.0 12/18/2017       Diagnostic Results:  ECG: Reviewed  X-Ray: Reviewed  US: Reviewed  CT: Reviewed  Echo: Reviewed      ASSESSMENT/PLAN:     Patient Active Problem List   Diagnosis    Bowel obstruction    Liver masses    Malignant neoplasm of descending colon    Small bowel ischemia    Atrial fibrillation with rapid ventricular response    S/P colostomy    Hyperglycemia, unspecified      PAF with RVR, intermittent  SBO with colon cancer and liver metastasis,. S/p resection of colon ,colostomy,  liver biposy, POD#2  HTN       Plan:  Continue metoprolol 5 mg ivp prn for PAF.  Advise to add Eliquis 5 mg bid if ok with primary and surgery services.  Continue supportive care

## 2017-12-22 NOTE — PROGRESS NOTES
The patient was seen and examined: colostomy functioning, and less distended abdomen. Alert and oriented with improving GI function.  The final pathology is back.   Will continue current management with stoma care.  Ambulation.    David Ballard

## 2017-12-22 NOTE — PT/OT/SLP PROGRESS
Physical Therapy      Patient Name:  Herb Sumner   MRN:  [id number]    [id number]  12/22/2017  S: PT MET IN RM AGREED TO TX   O: PT SUP>SIT EOB WITH SBA. PT STOOD WITH  NO AD FOR T/F TO CHAIR WITH SBA. PT BREAKFAST DELIVERED TO RM . PT SEATED IN CHAIR AND SET UP WITH BREAKFAST. PT LEFT SEATED WITH CALL BELL IN REACH AND ALL NEEDS MET.   S: PT T/F TO CHAIR WITH NO COMPLAINTS AND CALL BELL IN REACH   P: CONT POC      Eleni Baptiste, PT

## 2017-12-22 NOTE — PROGRESS NOTES
[facility] Hospital Medicine  Progress Note    Patient Name: Herb Sumner  MRN: [id number]  Patient Class: IP- Inpatient   Admission Date: 12/18/2017  Length of Stay: 4 days  Attending Physician: Denzel Mathias MD  Primary Care Provider: Primary Doctor No        Subjective:     Principal Problem:Bowel obstruction    HPI:  Herb Sumner is a 77 year old male who presented to [facility] Room with reports of a 2 weeks history of worsening abdominal distention, belching, nausea and vomiting. The patient reports that his abdominal distention was initially mild and progressed over the last two weeks. He had some intermittent relief with vegetable laxatives, but symptoms returned. Today, the patient came to the ED because he could not tolerate even water without vomiting. There is associated frequent belching and hiccups. He reports that his intake has gradually decreased since the onset of symptoms. His last bowel movement was 5 days ago and he last passed gas one day ago. In the ED CT scan of the abdomen showed multiple liver lesions as well as distended small bowel and colon. The patient was also found to have hyponatremia with a sodium of 125. Code status was discussed with the patient who is a full code. The patient is unsure of if or when he last had a colonoscopy. His daughter, Kimberly Sumner is his surrogate medical decision maker.     Hospital Course:  12/19 Patient s/p colectomy. Remains intubated. Patient responsive to command can MEYER. Patient denies significant discomfort. ART line inserted. Case discussed with Surgery / Pulmonary CC    12/20 - Patient extubated / communicative. Patient denies complaint. Abdomen remains distended with hypoactive BS. Case discussed with Surgery. Initiate TPN and await return of bowel function. Heme Onc has been consulted for Obstructing Colon mass with apparent liver mets. Bx result pending.    12/21: Patient started on clear liquid diet at noon today.  He ate broth and jello. Denies N/V and abd pain. Stoma pink with colostomy bag in place with moderate amount of brown stool present. Adb remains distended with hypoactive Bs. Daughter at bedside, reporting they have been accepted at [facility] as soon as pt is discharged. Discussed plan with ROBE Pandya from General Surgery.      Interval History:  No acute events overnight. Pt given regular diet today - tolerating well with no N/V, abd pain. Stoma is pink with colostomy bag in place. Abd incision with sutures c/d/i. Abd slightly distended but not tender to palp. Pathology report confirms cancer dx.  Continue current plan of care.  Likely discharge Sunday or Monday per Dr. Ballard. **pts daughter needs discharge papers in her hand as soon as they are printed so that she can fax them to [facility] to get appointment scheduled. **    Review of Systems   Constitutional: Positive for activity change. Negative for chills and fever.   HENT: Negative for congestion and sore throat.    Eyes: Negative for pain.   Respiratory: Negative for cough, shortness of breath and wheezing.    Cardiovascular: Negative for chest pain, palpitations and leg swelling.   Gastrointestinal: Positive for abdominal distention. Negative for abdominal pain, constipation, diarrhea, nausea and vomiting.   Endocrine: Negative for polyuria.   Genitourinary: Negative for dysuria and hematuria.   Musculoskeletal: Negative for arthralgias and myalgias.   Skin: Positive for wound (midline to abd - staples clean and dry). Negative for color change.   Allergic/Immunologic: Negative.    Neurological: Negative for dizziness.   Hematological: Negative.    Psychiatric/Behavioral: Negative for agitation, behavioral problems and confusion.     Objective:     Vital Signs (Most Recent):  Temp: 97.6 °F (36.4 °C) (12/22/17 1520)  Pulse: 71 (12/22/17 1520)  Resp: 18 (12/22/17 1520)  BP: (!) 161/77 (12/22/17 1520)  SpO2: (!) 92 % (12/22/17 1520) Vital Signs (24h Range):  Temp:   [97.6 °F (36.4 °C)-98.9 °F (37.2 °C)] 97.6 °F (36.4 °C)  Pulse:  [71-87] 71  Resp:  [16-20] 18  SpO2:  [92 %-95 %] 92 %  BP: (113-183)/(56-81) 161/77     Weight: 76.5 kg (168 lb 10.4 oz)  Body mass index is 28.07 kg/m².    Intake/Output Summary (Last 24 hours) at 12/22/17 1754  Last data filed at 12/22/17 0517   Gross per 24 hour   Intake          2487.17 ml   Output              750 ml   Net          1737.17 ml      Physical Exam   Constitutional: He is oriented to person, place, and time. He appears well-developed and well-nourished.   HENT:   Head: Normocephalic and atraumatic.   Mouth/Throat: Oropharynx is clear and moist.   Eyes: Conjunctivae and EOM are normal. Pupils are equal, round, and reactive to light.   Neck: Normal range of motion. No JVD present.   Cardiovascular: Normal rate, regular rhythm, normal heart sounds and intact distal pulses.    No murmur heard.  Pulmonary/Chest: Effort normal and breath sounds normal. No respiratory distress. He has no wheezes.   Abdominal: Soft. He exhibits distension.   Hypoactive BS   Musculoskeletal: Normal range of motion. He exhibits no edema.   Neurological: He is alert and oriented to person, place, and time. No cranial nerve deficit.   Skin: Skin is warm and dry. Capillary refill takes 2 to 3 seconds.   Surgical incision to mid abdomen, C/D/I  colonostomy bag to LLQ intact with mod amt of brown stool. Stoma pink. Not tender to palp   Psychiatric: He has a normal mood and affect. His behavior is normal.   Nursing note and vitals reviewed.      Significant Labs:   Recent Lab Results       12/22/17  0515 12/22/17  0237 12/21/17  2341      Anion Gap  8      Aniso  Slight      Baso #  CANCELED  Comment:  Result canceled by the ancillary      Basophil%  1.0      BUN, Bld  9      Calcium  8.1(L)      Chloride  105      CO2  24      Creatinine  0.6      Differential Method  Manual      eGFR if   >60      eGFR if non    >60  Comment:  Calculation used to obtain the estimated glomerular filtration  rate (eGFR) is the CKD-EPI equation.         Eos #  CANCELED  Comment:  Result canceled by the ancillary      Eosinophil%  0.0      Glucose  98      Gran%  81.0(H)      Hematocrit  32.6(L)      Hemoglobin  11.1(L)      Lymph #  CANCELED  Comment:  Result canceled by the ancillary      Lymph%  9.0(L)      Magnesium  1.8      MCH  29.0      MCHC  34.0      MCV  85      Mono #  CANCELED  Comment:  Result canceled by the ancillary      Mono%  9.0      MPV  9.0(L)      Platelets  226      POCT Glucose 103  123(H)     Poly  Occasional      Potassium  3.5      RBC  3.83(L)      RDW  14.1      Sodium  137      WBC  10.56          All pertinent labs within the past 24 hours have been reviewed.    Significant Imaging: I have reviewed all pertinent imaging results/findings within the past 24 hours.    Assessment/Plan:      * Bowel obstruction    -Concerning for possible obstructing mass.   -Discussed plan of care with Dr. Ballard.   -Keep patient NPO for possible surgery.   -NG tube placement due to persistent vomiting.    12/19 - Patient s/p colectomy. Tolerated procedure awaiting pathology - Likely CA  12/20 - Sx on consult. Path pending. Heme Onc on consult  12/21 - Heme/Onc following.      --CEA in the morning     --Pathology shows: Metastatic adenocarcinoma consistent with colon primary, Moderately-differentiated adenocarcinoma, and 3 of 3 lymph nodes positive for metastatic carcinoma.     --staging PET to be performed at [facility]          Hyperglycemia, unspecified    12/20 - Controlled / NISS          S/P colostomy    Surgery following  --stoma pink. Colostomy bag draining brown stool.  --12/21: started on clear liquid diet today.  --12/22: pt tolerating regular diet today.            Atrial fibrillation with rapid ventricular response    12/20 - Controlled / Monitor / BB PRN          Small bowel ischemia    S/p rsx POD#1    12/20 - POD # 2 Await  resumption of bowel function.   12/21 - POD #3 stool present in colostomy bag. Clear liquid diet initiated  12/22 - POD #4 stoma pink, colostomy bag with small amt stool and red blood. Pt started on regular diet          Malignant neoplasm of descending colon    12/19 - Awaiting pathology. Discussed with Family. Plan for Heme Onc  12/20 - Path pending  12/21 - path pending. Heme/Onc following. Pt accepted at [facility] post discharge  12/22   --Pathology shows: Metastatic adenocarcinoma consistent with colon primary, Moderately-differentiated adenocarcinoma, and 3 of 3 lymph nodes positive for metastatic carcinoma.          Liver masses    -Possibly represent metastasis associated with obstructing mass.   -Further work up will be planned as outpatient, if needed.     12/19 - Bx pending  12/20 - Bx pending  12/21 - Bx pending  12/22 --Pathology shows: Metastatic adenocarcinoma consistent with colon primary, Moderately-differentiated adenocarcinoma, and 3 of 3 lymph nodes positive for metastatic carcinoma.          VTE Risk Mitigation         Ordered     enoxaparin injection 40 mg  Every 24 hours (non-standard times)     Route:  Subcutaneous        12/18/17 2325     Reason for No Pharmacological VTE Prophylaxis  Once      12/18/17 2325     Medium Risk of VTE  Once      12/18/17 2325     Place sequential compression device  Until discontinued      12/18/17 1620              FRANSISCO Anderson  Department of Hospital Medicine   [facility]

## 2017-12-22 NOTE — PROGRESS NOTES
[facility]  Hematology/Oncology  Progress Note    Patient Name: Herb Sumner  Admission Date: 12/18/2017  Hospital Length of Stay: 4 days  Code Status: Full Code     Subjective:     HPI:    78 yo WM with a PMH of HLD and daily mild etoh consumption.  He presented to [facility] ED last night with complaints of 2 week hx of worsening constant abd pain, abd distention, N/V, loss of appetite, malaise and obstipation of mod to severe severity.  CT Abd in ED showed multiple liver lesions as well as distended small bowel and colon, Na 125.  Surgery consulted and patient taken to OR on 12/18/17 by Dr. Ballard finding severely dilated small bowel was found to be ischemic due to pressure/distension. The entire colon was severely distended, and the malignant stricture was noted at the distal end of descending colon. Liver metastatic lesions were noted and excisional biopsy done.  Final pathology remains pending    Patient remains nothing by mouth with NG tube in place.    Interval History:   NG tube removed  Patient tolerating clear liquids    Oncology Treatment Plan:   [No treatment plan]    Medications:  Continuous Infusions:   sodium chloride 0.9% 50 mL/hr at 12/22/17 0313     Scheduled Meds:   calcium gluconate IVPB  1,000 mg Intravenous Once    ciprofloxacin  400 mg Intravenous Q12H    enoxparin  40 mg Subcutaneous Q24H    famotidine  20 mg Intravenous BID    lactated ringers  500 mL Intravenous Once    metoprolol  5 mg Intravenous Q6H    metronidazole  500 mg Intravenous Q8H    nozaseptin   Each Nare BID     PRN Meds:dextrose 50%, diphenhydrAMINE, glucagon (human recombinant), hydrALAZINE, insulin aspart, morphine, ondansetron, promethazine     Review of Systems   Constitutional: Positive for activity change, appetite change and unexpected weight change. Negative for chills and fatigue.   HENT: Negative.    Eyes: Negative.    Respiratory: Negative.    Cardiovascular: Negative.    Gastrointestinal:  Positive for abdominal distention, abdominal pain and nausea.   Endocrine: Negative.    Genitourinary: Negative.    Musculoskeletal: Negative for arthralgias, back pain and joint swelling.   Skin: Positive for wound (surgical wound without signs of infection). Negative for color change, pallor and rash.   Allergic/Immunologic: Negative.    Neurological: Positive for weakness. Negative for dizziness, seizures, numbness and headaches.   Hematological: Negative.    Psychiatric/Behavioral: Negative.      Objective:     Vital Signs (Most Recent):  Temp: 98.7 °F (37.1 °C) (12/22/17 0344)  Pulse: 79 (12/22/17 0511)  Resp: 16 (12/22/17 0344)  BP: (!) 125/58 (12/22/17 0511)  SpO2: (!) 94 % (12/22/17 0344) Vital Signs (24h Range):  Temp:  [98.5 °F (36.9 °C)-98.9 °F (37.2 °C)] 98.7 °F (37.1 °C)  Pulse:  [76-87] 79  Resp:  [16-20] 16  SpO2:  [93 %-97 %] 94 %  BP: (113-183)/(56-81) 125/58     Weight: 76.5 kg (168 lb 10.4 oz)  Body mass index is 28.07 kg/m².  Body surface area is 1.87 meters squared.      Intake/Output Summary (Last 24 hours) at 12/22/17 0646  Last data filed at 12/22/17 0517   Gross per 24 hour   Intake          2487.17 ml   Output             1050 ml   Net          1437.17 ml       Physical Exam   Constitutional: He is oriented to person, place, and time. He appears well-developed and well-nourished. No distress.   HENT:   Head: Normocephalic and atraumatic.   Nose: Nose normal.   Mouth/Throat: Oropharynx is clear and moist. No oropharyngeal exudate.   Eyes: Conjunctivae and EOM are normal. Pupils are equal, round, and reactive to light. Right eye exhibits no discharge. Left eye exhibits no discharge.   Neck: Normal range of motion. Neck supple. No tracheal deviation present. No thyromegaly present.   Cardiovascular: Normal rate and regular rhythm.  Exam reveals no gallop and no friction rub.    No murmur heard.  Pulmonary/Chest: Effort normal. No accessory muscle usage. No respiratory distress. He has  decreased breath sounds. He has no wheezes. He has no rhonchi. He has no rales.   Abdominal: Soft. Bowel sounds are normal. He exhibits no distension and no mass. There is no tenderness. There is no rebound and no guarding.   Colostomy   Musculoskeletal: Normal range of motion. He exhibits no edema.   Lymphadenopathy:     He has no cervical adenopathy.   Neurological: He is alert and oriented to person, place, and time. No cranial nerve deficit. Coordination normal.   Skin: Skin is warm, dry and intact. Capillary refill takes less than 2 seconds. He is not diaphoretic. No pallor.   Psychiatric: He has a normal mood and affect. Thought content normal.   Vitals reviewed.      Significant Labs:   CBC:   Recent Labs  Lab 12/21/17 0219 12/22/17 0237   WBC 12.21 10.56   HGB 11.0* 11.1*   HCT 32.5* 32.6*    226   , CMP:   Recent Labs  Lab 12/21/17 0219 12/22/17 0237    137   K 3.6 3.5    105   CO2 25 24   * 98   BUN 12 9   CREATININE 0.6 0.6   CALCIUM 7.8* 8.1*   ANIONGAP 10 8   EGFRNONAA >60 >60   , Haptoglobin: No results for input(s): HAPTOGLOBIN in the last 48 hours., Immunology: No results for input(s): SPEP, YUSUF, GILMER, FREELAMBDALI in the last 48 hours., LDH: No results for input(s): LDHCSF, BFSOURCE in the last 48 hours. and LFTs: No results for input(s): ALT, AST, ALKPHOS, BILITOT, PROT, ALBUMIN in the last 48 hours.    Diagnostic Results:  I have reviewed all pertinent imaging results/findings within the past 24 hours.    Assessment/Plan:     Malignant neoplasm of descending colon    77-year-old gentleman who presented with bowel obstruction secondary to distal descending colon mass with liver lesions consistent with metastatic colorectal cancer.  The patient is status post resection and liver biopsy with final pathology still pending. The patient and family verbalized their wishes to be evaluated at [facility] after discharge.  I assured them that medical records and pathology reports will  be available for [facility] evaluation ASAP.     --CEA pending  --Follow-up on final pathology  --staging PET to be performed at [facility]            Thank you for your consult. I will follow-up with patient. Please contact us if you have any additional questions.     Sudhir Torres Jr, MD  Hematology/Oncology  [facility]

## 2017-12-22 NOTE — PLAN OF CARE
Problem: Patient Care Overview  Goal: Plan of Care Review  Outcome: Ongoing (interventions implemented as appropriate)  Respirations even and unlabored, no distress noted on assessment, no pain nausea or shortness of breath, midline abdominal incision open to air, left quad colostomy, daughter at bedside, telemetry

## 2017-12-22 NOTE — PLAN OF CARE
Problem: Patient Care Overview  Goal: Plan of Care Review  Outcome: Ongoing (interventions implemented as appropriate)  PT SARAH OOB TO CHAIR WITH SBA.

## 2017-12-22 NOTE — SUBJECTIVE & OBJECTIVE
Interval History:  No acute events overnight. Pt given regular diet today - tolerating well with no N/V, abd pain. Stoma is pink with colostomy bag in place. Abd incision with sutures c/d/i. Abd slightly distended but not tender to palp. Pathology report confirms cancer dx.  Continue current plan of care.  Likely discharge Sunday or Monday per Dr. Ballard. **pts daughter needs discharge papers in her hand as soon as they are printed so that she can fax them to [facility] to get appointment scheduled. **    Review of Systems   Constitutional: Positive for activity change. Negative for chills and fever.   HENT: Negative for congestion and sore throat.    Eyes: Negative for pain.   Respiratory: Negative for cough, shortness of breath and wheezing.    Cardiovascular: Negative for chest pain, palpitations and leg swelling.   Gastrointestinal: Positive for abdominal distention. Negative for abdominal pain, constipation, diarrhea, nausea and vomiting.   Endocrine: Negative for polyuria.   Genitourinary: Negative for dysuria and hematuria.   Musculoskeletal: Negative for arthralgias and myalgias.   Skin: Positive for wound (midline to abd - staples clean and dry). Negative for color change.   Allergic/Immunologic: Negative.    Neurological: Negative for dizziness.   Hematological: Negative.    Psychiatric/Behavioral: Negative for agitation, behavioral problems and confusion.     Objective:     Vital Signs (Most Recent):  Temp: 97.6 °F (36.4 °C) (12/22/17 1520)  Pulse: 71 (12/22/17 1520)  Resp: 18 (12/22/17 1520)  BP: (!) 161/77 (12/22/17 1520)  SpO2: (!) 92 % (12/22/17 1520) Vital Signs (24h Range):  Temp:  [97.6 °F (36.4 °C)-98.9 °F (37.2 °C)] 97.6 °F (36.4 °C)  Pulse:  [71-87] 71  Resp:  [16-20] 18  SpO2:  [92 %-95 %] 92 %  BP: (113-183)/(56-81) 161/77     Weight: 76.5 kg (168 lb 10.4 oz)  Body mass index is 28.07 kg/m².    Intake/Output Summary (Last 24 hours) at 12/22/17 8770  Last data filed at 12/22/17 0517   Gross per 24 hour    Intake          2487.17 ml   Output              750 ml   Net          1737.17 ml      Physical Exam   Constitutional: He is oriented to person, place, and time. He appears well-developed and well-nourished.   HENT:   Head: Normocephalic and atraumatic.   Mouth/Throat: Oropharynx is clear and moist.   Eyes: Conjunctivae and EOM are normal. Pupils are equal, round, and reactive to light.   Neck: Normal range of motion. No JVD present.   Cardiovascular: Normal rate, regular rhythm, normal heart sounds and intact distal pulses.    No murmur heard.  Pulmonary/Chest: Effort normal and breath sounds normal. No respiratory distress. He has no wheezes.   Abdominal: Soft. He exhibits distension.   Hypoactive BS   Musculoskeletal: Normal range of motion. He exhibits no edema.   Neurological: He is alert and oriented to person, place, and time. No cranial nerve deficit.   Skin: Skin is warm and dry. Capillary refill takes 2 to 3 seconds.   Surgical incision to mid abdomen, C/D/I  colonostomy bag to LLQ intact with mod amt of brown stool. Stoma pink. Not tender to palp   Psychiatric: He has a normal mood and affect. His behavior is normal.   Nursing note and vitals reviewed.      Significant Labs:   Recent Lab Results       12/22/17  0515 12/22/17  0237 12/21/17  2341      Anion Gap  8      Aniso  Slight      Baso #  CANCELED  Comment:  Result canceled by the ancillary      Basophil%  1.0      BUN, Bld  9      Calcium  8.1(L)      Chloride  105      CO2  24      Creatinine  0.6      Differential Method  Manual      eGFR if   >60      eGFR if non   >60  Comment:  Calculation used to obtain the estimated glomerular filtration  rate (eGFR) is the CKD-EPI equation.         Eos #  CANCELED  Comment:  Result canceled by the ancillary      Eosinophil%  0.0      Glucose  98      Gran%  81.0(H)      Hematocrit  32.6(L)      Hemoglobin  11.1(L)      Lymph #  CANCELED  Comment:  Result canceled by the  ancillary      Lymph%  9.0(L)      Magnesium  1.8      MCH  29.0      MCHC  34.0      MCV  85      Mono #  CANCELED  Comment:  Result canceled by the ancillary      Mono%  9.0      MPV  9.0(L)      Platelets  226      POCT Glucose 103  123(H)     Poly  Occasional      Potassium  3.5      RBC  3.83(L)      RDW  14.1      Sodium  137      WBC  10.56          All pertinent labs within the past 24 hours have been reviewed.    Significant Imaging: I have reviewed all pertinent imaging results/findings within the past 24 hours.

## 2017-12-22 NOTE — ASSESSMENT & PLAN NOTE
-Possibly represent metastasis associated with obstructing mass.   -Further work up will be planned as outpatient, if needed.     12/19 - Bx pending  12/20 - Bx pending  12/21 - Bx pending  12/22 --Pathology shows: Metastatic adenocarcinoma consistent with colon primary, Moderately-differentiated adenocarcinoma, and 3 of 3 lymph nodes positive for metastatic carcinoma.

## 2017-12-22 NOTE — ASSESSMENT & PLAN NOTE
12/19 - Awaiting pathology. Discussed with Family. Plan for Heme Onc  12/20 - Path pending  12/21 - path pending. Heme/Onc following. Pt accepted at [facility] post discharge  12/22   --Pathology shows: Metastatic adenocarcinoma consistent with colon primary, Moderately-differentiated adenocarcinoma, and 3 of 3 lymph nodes positive for metastatic carcinoma.

## 2017-12-22 NOTE — PLAN OF CARE
Problem: Patient Care Overview  Goal: Plan of Care Review  Outcome: Ongoing (interventions implemented as appropriate)  Recommendation/Intervention: 1. Continue current diet and encourage good intake  Goals: Intake of 75% of meals or better  Nutrition Goal Status: new  Communication of RD Recs: other (comment) (care plan, sticky note)

## 2017-12-22 NOTE — PLAN OF CARE
Problem: Patient Care Overview  Goal: Plan of Care Review  Outcome: Ongoing (interventions implemented as appropriate)  Remained free from injury. Diet advanced to regular, tolerating well. Denies pain. Colostomy bag changed with wound care today.

## 2017-12-22 NOTE — PROGRESS NOTES
Follow up visit with Mr. Sumner for continued colostomy education.  Patient lying in bed, wife and daughter at bedside. Patient not interested in participating in self care, requests daughter Kimberly to perform all colostomy related care.  Previous colostomy education reinforced at this time including when and how to empty, hygiene, and activity.  ML abdominal incision intact and well approximated with staples.  Colostomy pouch removed at this time, and stoma and karolyn stomal skin cleansed with water and 4x4 gauze.  Patted dry.  Sprayed karolyn stomal skin with cavilon.  Karolyn stomal skin intact with no redness or breakdown noted.  Stoma dark pink and moist, measures 38mm.  Brava moldable ring applied and SenSura ARGENIS one piece pouch cut to size and applied to LLQ stoma with good seal noted.  Daughter Kimberly able to return demonstrate emptying pouch.  Teach back method used with all education with james Harris, who was engaged in educational process. She states she will be staying with her parents, and caring for his colostomy until it is able to be reversed.  Offered assistance to order supplies, but they do not want to at this time.  They were provided a box of pouches and barrier rings, as well as cavilon spray by case management yesterday, and have these supplies for discharge. Explained that they do not need to use paste, as they are using rings instead. Printed educational material also at bedside, and daughter has been reviewing. They are planning to go straight to [place] to [facility] for evaluation after discharge.  Questions asked and answered.  Will follow up Tuesday if patient remains hospitalized.

## 2017-12-22 NOTE — CONSULTS
[facility] -   Adult Nutrition  Consult Note    SUMMARY     Recommendations  Recommendation/Intervention: 1. Continue current diet and encourage good intake  Goals: Intake of 75% of meals or better  Nutrition Goal Status: new  Communication of RD Recs: other (comment) (care plan, sticky note)      Reason for Assessment  Reason for Assessment: RD follow-up   Current Diagnosis:  1. Abdominal pain, unspecified abdominal location    2. Nausea    3. Liver lesion    4. Partial small bowel obstruction    5. Atrial fibrillation    6. Malignant neoplasm of descending colon    7. Bowel obstruction    8. Hyponatremia    9. Liver masses    10. Intestinal obstruction, unspecified cause, unspecified whether partial or complete    11. Acute hypoxemic respiratory failure    12. Atrial fibrillation with rapid ventricular response    13. Hyperglycemia, unspecified    14. Hypokalemia    15. S/P colostomy    16. Shock    17. Small bowel ischemia      Past Medical History:   Diagnosis Date    EtOH dependence     Hyperlipemia     Hyperlipidemia      General Information Comments: Advanced to Regular diet this afternoon    Nutrition Discharge Planning: Home on oral diet    Nutrition Prescription Ordered  Current Diet Order: Regular  % Intake of Estimated Energy Needs: 75 - 100 %    Nutrition Risk Screen  Nutrition Risk Screen: no indicators present (intubated)    Nutrition/Diet History  Typical Food/Fluid Intake: unable to obtain  Food Preferences: none reported       Labs/Tests/Procedures/Meds  Pertinent Labs Reviewed: reviewed      BMP  Lab Results   Component Value Date     12/22/2017    K 3.5 12/22/2017     12/22/2017    CO2 24 12/22/2017    BUN 9 12/22/2017    CREATININE 0.6 12/22/2017    CALCIUM 8.1 (L) 12/22/2017    ANIONGAP 8 12/22/2017    ESTGFRAFRICA >60 12/22/2017    EGFRNONAA >60 12/22/2017     Lab Results   Component Value Date    ALBUMIN 1.9 (L) 12/19/2017     Lab Results   Component Value Date  "   CALCIUM 8.1 (L) 12/22/2017       Recent Labs  Lab 12/22/17  0515   POCTGLUCOSE 103       Pertinent Medications Reviewed: reviewed       Physical Findings  Overall Physical Appearance: other (see comments) (colostomy)  Tubes:  (none)  Oral/Mouth Cavity: WDL  Skin: incision    Anthropometrics  Height: 5' 5" (165.1 cm)  Weight Method: Standard Scale  Weight: 76.5 kg (168 lb 10.4 oz)  Ideal Body Weight (IBW), Male: 136 lb     % Ideal Body Weight, Male (lb): 124.01 lb     BMI (Calculated): 28.1  BMI Grade: 25 - 29.9 - overweight    Estimated/Assessed Needs  Weight Used For Calorie Calculations: 76.5 kg (168 lb 10.4 oz)   Energy Calorie Requirements (kcal): 1700  Energy Need Method: [place]-St Jeor (x1.2)     Weight Used For Protein Calculations: 76.5 kg (168 lb 10.4 oz)  1.2 gm Protein (gm): 91.99 and 1.5 gm Protein (gm): 114.99  Fluid Requirements (mL): 2316     RDA Method (mL): 1700      Assessment and Plan  * Bowel obstruction    Nutrition Diagnosis:  Inadequate energy intake    Related to (etiology):   Altered GI tract function    Signs and Symptoms (as evidenced by):   Bowel obstruction     Interventions/Recommendations (treatment strategy):  1. Continue Regular diet    Nutrition Diagnosis Status:   Improving (patient is on oral diet)              Monitor and Evaluation  Food and Nutrient Intake: food and beverage intake  Food and Nutrient Adminstration: diet order  Anthropometric Measurements: weight  Biochemical Data, Medical Tests and Procedures: electrolyte and renal panel, glucose/endocrine profile  Nutrition-Focused Physical Findings: skin      Nutrition Follow-Up  RD Follow-up?: Yes (1x weekly)    "

## 2017-12-22 NOTE — ASSESSMENT & PLAN NOTE
77-year-old gentleman who presented with bowel obstruction secondary to distal descending colon mass with liver lesions consistent with metastatic colorectal cancer.  The patient is status post resection and liver biopsy with final pathology still pending. The patient and family verbalized their wishes to be evaluated at [facility] after discharge.  I assured them that medical records and pathology reports will be available for [facility] evaluation ASAP.     --CEA pending  --Follow-up on final pathology  --staging PET to be performed at [facility]

## 2017-12-22 NOTE — ASSESSMENT & PLAN NOTE
Surgery following  --stoma pink. Colostomy bag draining brown stool.  --12/21: started on clear liquid diet today.  --12/22: pt tolerating regular diet today.

## 2017-12-22 NOTE — ASSESSMENT & PLAN NOTE
-Concerning for possible obstructing mass.   -Discussed plan of care with Dr. Ballard.   -Keep patient NPO for possible surgery.   -NG tube placement due to persistent vomiting.    12/19 - Patient s/p colectomy. Tolerated procedure awaiting pathology - Likely CA  12/20 - Sx on consult. Path pending. Heme Onc on consult  12/21 - Heme/Onc following.      --CEA in the morning     --Pathology shows: Metastatic adenocarcinoma consistent with colon primary, Moderately-differentiated adenocarcinoma, and 3 of 3 lymph nodes positive for metastatic carcinoma.     --staging PET to be performed at [facility]

## 2017-12-22 NOTE — PT/OT/SLP PROGRESS
Occupational Therapy  Treatment    Herb Sumner   MRN: [id number]   Admitting Diagnosis: Bowel obstruction    OT Date of Treatment: 12/22/17   OT Start Time: 1338  OT Stop Time: 1358  OT Total Time (min): 20 min    Billable Minutes:  Therapeutic Activity  x 20 min    General Precautions: Standard, fall  Orthopedic Precautions: N/A  Braces: N/A         Subjective:  Communicated with pt, and nurse prior to session.    Pain/Comfort  Pain Rating 1: 0/10    Objective:  Patient found with: peripheral IV, colostomy     Functional Mobility:  Bed Mobility: mod indep to SPV for all bed mobility skills       Transfers:        Functional Ambulation: fx ambulation with RW with  x 2 ft, steady gait pace, no LOB    Activities of Daily Living:     Feeding adaptive equipment:      UE adaptive equipment:      LE adaptive equipment:    Pt requires total assistance to janay/doff socks in sitting.                  Bathing adaptive equipment:     Balance:   Static Sit: GOOD+: Takes MAXIMAL challenges from all directions.    Dynamic Sit: GOOD: Maintains balance through MODERATE excursions of active trunk movement  Static Stand: FAIR: Maintains without assist but unable to take challenges  Dynamic stand: FAIR+: Needs CLOSE SUPERVISION during gait and is able to right self with minor LOB    Therapeutic Activities and Exercises:      AM-PAC 6 CLICK ADL   How much help from another person does this patient currently need?   1 = Unable, Total/Dependent Assistance  2 = A lot, Maximum/Moderate Assistance  3 = A little, Minimum/Contact Guard/Supervision  4 = None, Modified [place]/Independent    Putting on and taking off regular lower body clothing? : 2  Bathing (including washing, rinsing, drying)?: 2  Toileting, which includes using toilet, bedpan, or urinal? : 2  Putting on and taking off regular upper body clothing?: 3  Taking care of personal grooming such as brushing teeth?: 3  Eating meals?: 3  Total Score: 15     AM-PAC Raw  "Score CMS "G-Code Modifier Level of Impairment Assistance   6 % Total / Unable   7 - 8 CM 80 - 100% Maximal Assist   9-13 CL 60 - 80% Moderate Assist   14 - 19 CK 40 - 60% Moderate Assist   20 - 22 CJ 20 - 40% Minimal Assist   23 CI 1-20% SBA / CGA   24 CH 0% Independent/ Mod I       Patient left supine with all lines intact, call button in reach and bed alarm on    ASSESSMENT:  Herb Sumner is a 77 y.o. male with a medical diagnosis of Bowel obstruction and presents with impaired self care skills and fx mobility.    Rehab identified problem list/impairments: Rehab identified problem list/impairments: weakness, impaired self care skills, impaired functional mobilty, gait instability, impaired balance, impaired endurance, decreased coordination    Rehab potential is good.    Activity tolerance: Good    Discharge recommendations: Discharge Facility/Level Of Care Needs: home health OT     Barriers to discharge: Barriers to Discharge: None    Equipment recommendations: none     GOALS:    Occupational Therapy Goals        Problem: Occupational Therapy Goal    Goal Priority Disciplines Outcome Interventions   Occupational Therapy Goal     OT, PT/OT Ongoing (interventions implemented as appropriate)    Description:  Goals to be met by: 12/27/17     Patient will increase functional independence with ADLs by performing:    UE Dressing with Moderate Assistance.  LE Dressing with Moderate Assistance.  Toileting from bedside commode with Moderate Assistance for hygiene and clothing management.   Toilet transfer to bedside commode with Minimal Assistance.  Upper extremity exercise program x10 reps per handout, with supervision.                      Plan:  Patient to be seen 3 x/week to address the above listed problems via therapeutic activities, therapeutic exercises, self-care/home management  Plan of Care expires: 12/27/17  Plan of Care reviewed with: patient         Ewa Sanjay, OT  12/22/2017  "

## 2017-12-22 NOTE — SUBJECTIVE & OBJECTIVE
Interval History:   NG tube removed  Patient tolerating clear liquids    Oncology Treatment Plan:   [No treatment plan]    Medications:  Continuous Infusions:   sodium chloride 0.9% 50 mL/hr at 12/22/17 0313     Scheduled Meds:   calcium gluconate IVPB  1,000 mg Intravenous Once    ciprofloxacin  400 mg Intravenous Q12H    enoxparin  40 mg Subcutaneous Q24H    famotidine  20 mg Intravenous BID    lactated ringers  500 mL Intravenous Once    metoprolol  5 mg Intravenous Q6H    metronidazole  500 mg Intravenous Q8H    nozaseptin   Each Nare BID     PRN Meds:dextrose 50%, diphenhydrAMINE, glucagon (human recombinant), hydrALAZINE, insulin aspart, morphine, ondansetron, promethazine     Review of Systems   Constitutional: Positive for activity change, appetite change and unexpected weight change. Negative for chills and fatigue.   HENT: Negative.    Eyes: Negative.    Respiratory: Negative.    Cardiovascular: Negative.    Gastrointestinal: Positive for abdominal distention, abdominal pain and nausea.   Endocrine: Negative.    Genitourinary: Negative.    Musculoskeletal: Negative for arthralgias, back pain and joint swelling.   Skin: Positive for wound (surgical wound without signs of infection). Negative for color change, pallor and rash.   Allergic/Immunologic: Negative.    Neurological: Positive for weakness. Negative for dizziness, seizures, numbness and headaches.   Hematological: Negative.    Psychiatric/Behavioral: Negative.      Objective:     Vital Signs (Most Recent):  Temp: 98.7 °F (37.1 °C) (12/22/17 0344)  Pulse: 79 (12/22/17 0511)  Resp: 16 (12/22/17 0344)  BP: (!) 125/58 (12/22/17 0511)  SpO2: (!) 94 % (12/22/17 0344) Vital Signs (24h Range):  Temp:  [98.5 °F (36.9 °C)-98.9 °F (37.2 °C)] 98.7 °F (37.1 °C)  Pulse:  [76-87] 79  Resp:  [16-20] 16  SpO2:  [93 %-97 %] 94 %  BP: (113-183)/(56-81) 125/58     Weight: 76.5 kg (168 lb 10.4 oz)  Body mass index is 28.07 kg/m².  Body surface area is 1.87  meters squared.      Intake/Output Summary (Last 24 hours) at 12/22/17 0646  Last data filed at 12/22/17 0517   Gross per 24 hour   Intake          2487.17 ml   Output             1050 ml   Net          1437.17 ml       Physical Exam   Constitutional: He is oriented to person, place, and time. He appears well-developed and well-nourished. No distress.   HENT:   Head: Normocephalic and atraumatic.   Nose: Nose normal.   Mouth/Throat: Oropharynx is clear and moist. No oropharyngeal exudate.   Eyes: Conjunctivae and EOM are normal. Pupils are equal, round, and reactive to light. Right eye exhibits no discharge. Left eye exhibits no discharge.   Neck: Normal range of motion. Neck supple. No tracheal deviation present. No thyromegaly present.   Cardiovascular: Normal rate and regular rhythm.  Exam reveals no gallop and no friction rub.    No murmur heard.  Pulmonary/Chest: Effort normal. No accessory muscle usage. No respiratory distress. He has decreased breath sounds. He has no wheezes. He has no rhonchi. He has no rales.   Abdominal: Soft. Bowel sounds are normal. He exhibits no distension and no mass. There is no tenderness. There is no rebound and no guarding.   Colostomy   Musculoskeletal: Normal range of motion. He exhibits no edema.   Lymphadenopathy:     He has no cervical adenopathy.   Neurological: He is alert and oriented to person, place, and time. No cranial nerve deficit. Coordination normal.   Skin: Skin is warm, dry and intact. Capillary refill takes less than 2 seconds. He is not diaphoretic. No pallor.   Psychiatric: He has a normal mood and affect. Thought content normal.   Vitals reviewed.      Significant Labs:   CBC:   Recent Labs  Lab 12/21/17 0219 12/22/17 0237   WBC 12.21 10.56   HGB 11.0* 11.1*   HCT 32.5* 32.6*    226   , CMP:   Recent Labs  Lab 12/21/17 0219 12/22/17 0237    137   K 3.6 3.5    105   CO2 25 24   * 98   BUN 12 9   CREATININE 0.6 0.6   CALCIUM 7.8*  8.1*   ANIONGAP 10 8   EGFRNONAA >60 >60   , Haptoglobin: No results for input(s): HAPTOGLOBIN in the last 48 hours., Immunology: No results for input(s): SPEP, YUSUF, GILMER, FREELAMBDALI in the last 48 hours., LDH: No results for input(s): LDHCSF, BFSOURCE in the last 48 hours. and LFTs: No results for input(s): ALT, AST, ALKPHOS, BILITOT, PROT, ALBUMIN in the last 48 hours.    Diagnostic Results:  I have reviewed all pertinent imaging results/findings within the past 24 hours.

## 2017-12-23 LAB
ANION GAP SERPL CALC-SCNC: 9 MMOL/L
ANISOCYTOSIS BLD QL SMEAR: SLIGHT
BASOPHILS NFR BLD: 0 %
BUN SERPL-MCNC: 8 MG/DL
CALCIUM SERPL-MCNC: 8.1 MG/DL
CHLORIDE SERPL-SCNC: 105 MMOL/L
CO2 SERPL-SCNC: 22 MMOL/L
CREAT SERPL-MCNC: 0.6 MG/DL
DACRYOCYTES BLD QL SMEAR: ABNORMAL
DIFFERENTIAL METHOD: ABNORMAL
EOSINOPHIL NFR BLD: 1 %
ERYTHROCYTE [DISTWIDTH] IN BLOOD BY AUTOMATED COUNT: 14.1 %
EST. GFR  (AFRICAN AMERICAN): >60 ML/MIN/1.73 M^2
EST. GFR  (NON AFRICAN AMERICAN): >60 ML/MIN/1.73 M^2
GLUCOSE SERPL-MCNC: 101 MG/DL
HCT VFR BLD AUTO: 32.5 %
HGB BLD-MCNC: 11.1 G/DL
HYPOCHROMIA BLD QL SMEAR: ABNORMAL
LYMPHOCYTES NFR BLD: 12 %
MAGNESIUM SERPL-MCNC: 1.6 MG/DL
MCH RBC QN AUTO: 28.8 PG
MCHC RBC AUTO-ENTMCNC: 34.2 G/DL
MCV RBC AUTO: 84 FL
METAMYELOCYTES NFR BLD MANUAL: 1 %
MONOCYTES NFR BLD: 5 %
MYELOCYTES NFR BLD MANUAL: 2 %
NEUTROPHILS NFR BLD: 79 %
OVALOCYTES BLD QL SMEAR: ABNORMAL
PLATELET # BLD AUTO: 206 K/UL
PLATELET BLD QL SMEAR: ABNORMAL
PMV BLD AUTO: 8.9 FL
POCT GLUCOSE: 103 MG/DL (ref 70–110)
POCT GLUCOSE: 107 MG/DL (ref 70–110)
POCT GLUCOSE: 120 MG/DL (ref 70–110)
POCT GLUCOSE: 130 MG/DL (ref 70–110)
POIKILOCYTOSIS BLD QL SMEAR: SLIGHT
POTASSIUM SERPL-SCNC: 3.4 MMOL/L
RBC # BLD AUTO: 3.86 M/UL
SODIUM SERPL-SCNC: 136 MMOL/L
WBC # BLD AUTO: 10.7 K/UL

## 2017-12-23 PROCEDURE — 25000003 PHARM REV CODE 250: Performed by: NURSE PRACTITIONER

## 2017-12-23 PROCEDURE — S0030 INJECTION, METRONIDAZOLE: HCPCS | Performed by: SURGERY

## 2017-12-23 PROCEDURE — 93010 ELECTROCARDIOGRAM REPORT: CPT | Mod: ,,, | Performed by: INTERNAL MEDICINE

## 2017-12-23 PROCEDURE — 80048 BASIC METABOLIC PNL TOTAL CA: CPT

## 2017-12-23 PROCEDURE — 63600175 PHARM REV CODE 636 W HCPCS: Performed by: SURGERY

## 2017-12-23 PROCEDURE — 97116 GAIT TRAINING THERAPY: CPT

## 2017-12-23 PROCEDURE — 85027 COMPLETE CBC AUTOMATED: CPT

## 2017-12-23 PROCEDURE — 85007 BL SMEAR W/DIFF WBC COUNT: CPT

## 2017-12-23 PROCEDURE — 21400001 HC TELEMETRY ROOM

## 2017-12-23 PROCEDURE — 25000003 PHARM REV CODE 250: Performed by: INTERNAL MEDICINE

## 2017-12-23 PROCEDURE — 25000003 PHARM REV CODE 250: Performed by: SURGERY

## 2017-12-23 PROCEDURE — 99233 SBSQ HOSP IP/OBS HIGH 50: CPT | Mod: ,,, | Performed by: INTERNAL MEDICINE

## 2017-12-23 PROCEDURE — 93005 ELECTROCARDIOGRAM TRACING: CPT

## 2017-12-23 PROCEDURE — 83735 ASSAY OF MAGNESIUM: CPT

## 2017-12-23 PROCEDURE — S0028 INJECTION, FAMOTIDINE, 20 MG: HCPCS | Performed by: INTERNAL MEDICINE

## 2017-12-23 PROCEDURE — 96372 THER/PROPH/DIAG INJ SC/IM: CPT

## 2017-12-23 RX ADMIN — FAMOTIDINE 20 MG: 20 INJECTION, SOLUTION INTRAVENOUS at 08:12

## 2017-12-23 RX ADMIN — METRONIDAZOLE 500 MG: 500 INJECTION, SOLUTION INTRAVENOUS at 05:12

## 2017-12-23 RX ADMIN — METRONIDAZOLE 500 MG: 500 INJECTION, SOLUTION INTRAVENOUS at 02:12

## 2017-12-23 RX ADMIN — SODIUM CHLORIDE: 0.9 INJECTION, SOLUTION INTRAVENOUS at 10:12

## 2017-12-23 RX ADMIN — CIPROFLOXACIN 400 MG: 2 INJECTION, SOLUTION INTRAVENOUS at 12:12

## 2017-12-23 RX ADMIN — METOPROLOL TARTRATE 5 MG: 5 INJECTION INTRAVENOUS at 05:12

## 2017-12-23 RX ADMIN — CIPROFLOXACIN 400 MG: 2 INJECTION, SOLUTION INTRAVENOUS at 11:12

## 2017-12-23 RX ADMIN — METOPROLOL TARTRATE 5 MG: 5 INJECTION INTRAVENOUS at 12:12

## 2017-12-23 RX ADMIN — METOPROLOL TARTRATE 5 MG: 5 INJECTION INTRAVENOUS at 11:12

## 2017-12-23 RX ADMIN — FAMOTIDINE 20 MG: 20 INJECTION, SOLUTION INTRAVENOUS at 09:12

## 2017-12-23 RX ADMIN — ENOXAPARIN SODIUM 40 MG: 100 INJECTION SUBCUTANEOUS at 11:12

## 2017-12-23 RX ADMIN — ENOXAPARIN SODIUM 40 MG: 100 INJECTION SUBCUTANEOUS at 12:12

## 2017-12-23 NOTE — ASSESSMENT & PLAN NOTE
Surgery following  --stoma pink. Colostomy bag draining brown stool.  --12/21: started on clear liquid diet today.  --12/22: pt tolerating regular diet today.  -12/23/17: tolerating full liquid diet

## 2017-12-23 NOTE — ASSESSMENT & PLAN NOTE
-Concerning for possible obstructing mass.   -Discussed plan of care with Dr. Ballard.   -Keep patient NPO for possible surgery.   -NG tube placement due to persistent vomiting.    12/19 - Patient s/p colectomy. Tolerated procedure awaiting pathology - Likely CA  12/20 - Sx on consult. Path pending. Heme Onc on consult  12/21 - Heme/Onc following.      --CEA in the morning     --Pathology shows: Metastatic adenocarcinoma consistent with colon primary, Moderately-differentiated adenocarcinoma, and 3 of 3 lymph nodes positive for metastatic carcinoma.     --staging PET to be performed at [facility]  12/23/17: will continue care at [facility] upon discharge

## 2017-12-23 NOTE — ASSESSMENT & PLAN NOTE
S/p rsx POD#1    12/20 - POD # 2 Await resumption of bowel function.   12/21 - POD #3 stool present in colostomy bag. Clear liquid diet initiated  12/22 - POD #4 stoma pink, colostomy bag with small amt stool and red blood. Pt started on regular diet

## 2017-12-23 NOTE — PROGRESS NOTES
[facility] Medicine  Progress Note    Patient Name: Herb Sumner  MRN: [id number]  Patient Class: IP- Inpatient   Admission Date: 12/18/2017  Length of Stay: 5 days  Attending Physician: Fidel Canada MD  Primary Care Provider: Primary Doctor No        Subjective:     Principal Problem:Bowel obstruction    HPI:  Herb Sumner is a 77 year old male who presented to [facility] with reports of a 2 weeks history of worsening abdominal distention, belching, nausea and vomiting. The patient reports that his abdominal distention was initially mild and progressed over the last two weeks. He had some intermittent relief with vegetable laxatives, but symptoms returned. Today, the patient came to the ED because he could not tolerate even water without vomiting. There is associated frequent belching and hiccups. He reports that his intake has gradually decreased since the onset of symptoms. His last bowel movement was 5 days ago and he last passed gas one day ago. In the ED CT scan of the abdomen showed multiple liver lesions as well as distended small bowel and colon. The patient was also found to have hyponatremia with a sodium of 125. Code status was discussed with the patient who is a full code. The patient is unsure of if or when he last had a colonoscopy. His daughter, Kimberly Sumner is his surrogate medical decision maker.     Hospital Course:  12/19 Patient s/p colectomy. Remains intubated. Patient responsive to command can MEYER. Patient denies significant discomfort. ART line inserted. Case discussed with Surgery / Pulmonary CC    12/20 - Patient extubated / communicative. Patient denies complaint. Abdomen remains distended with hypoactive BS. Case discussed with Surgery. Initiate TPN and await return of bowel function. Heme Onc has been consulted for Obstructing Colon mass with apparent liver mets. Bx result pending.    12/21: Patient started on clear liquid diet at noon  today. He ate broth and jello. Denies N/V and abd pain. Stoma pink with colostomy bag in place with moderate amount of brown stool present. Adb remains distended with hypoactive Bs. Daughter at bedside, reporting they have been accepted at [facility] as soon as pt is discharged. Discussed plan with ROBE Pandya from General Surgery.    12/23/17: Tolerating clear liquids. Stoma pink with colostomy bag in place, liquid brown stool. Abd distention with hypoactive bowel sounds x all quads. Discharge planning with home health    Interval History: Tolerating clear liquids. Stoma pink with colostomy bag in place, liquid brown stool. Abd distention with hypoactive bowel sounds x all quads. Discharge planning with home health      Review of Systems   Constitutional: Positive for activity change. Negative for appetite change, chills, diaphoresis, fatigue, fever and unexpected weight change.   HENT: Negative for congestion, nosebleeds, postnasal drip, rhinorrhea, sinus pain, sinus pressure, sore throat and trouble swallowing.    Eyes: Negative for photophobia, discharge and visual disturbance.   Respiratory: Negative for cough, choking, chest tightness, shortness of breath and wheezing.    Cardiovascular: Negative for chest pain, palpitations and leg swelling.   Gastrointestinal: Positive for abdominal distention. Negative for abdominal pain, constipation, diarrhea, nausea and vomiting.   Endocrine: Negative for cold intolerance and heat intolerance.   Genitourinary: Negative for difficulty urinating, discharge, flank pain, frequency, penile pain and urgency.   Musculoskeletal: Negative for arthralgias, back pain, myalgias and neck pain.   Skin: Negative for color change, pallor, rash and wound.   Allergic/Immunologic: Negative for environmental allergies, food allergies and immunocompromised state.   Neurological: Negative for dizziness, seizures, syncope, weakness, light-headedness, numbness and headaches.   Hematological:  Negative for adenopathy. Does not bruise/bleed easily.   Psychiatric/Behavioral: Negative for agitation, confusion and hallucinations. The patient is not nervous/anxious.      Objective:     Vital Signs (Most Recent):  Temp: 98.1 °F (36.7 °C) (12/23/17 1616)  Pulse: 74 (12/23/17 1616)  Resp: 18 (12/23/17 1616)  BP: (!) 163/77 (12/23/17 1616)  SpO2: 96 % (12/23/17 1616) Vital Signs (24h Range):  Temp:  [97.9 °F (36.6 °C)-98.8 °F (37.1 °C)] 98.1 °F (36.7 °C)  Pulse:  [67-78] 74  Resp:  [14-18] 18  SpO2:  [94 %-97 %] 96 %  BP: (140-168)/(63-79) 163/77     Weight: 76.5 kg (168 lb 10.4 oz)  Body mass index is 28.07 kg/m².    Intake/Output Summary (Last 24 hours) at 12/23/17 1750  Last data filed at 12/23/17 1700   Gross per 24 hour   Intake          3061.67 ml   Output              100 ml   Net          2961.67 ml      Physical Exam   Constitutional: He is oriented to person, place, and time. He appears well-developed and well-nourished. No distress.   HENT:   Head: Normocephalic and atraumatic.   Eyes: Conjunctivae and EOM are normal. Pupils are equal, round, and reactive to light.   Neck: Normal range of motion. Neck supple.   Cardiovascular: Normal rate, regular rhythm, normal heart sounds and intact distal pulses.  Exam reveals no friction rub.    No murmur heard.  Pulmonary/Chest: Effort normal and breath sounds normal. No respiratory distress. He has no wheezes. He has no rales.   Abdominal: Soft. Bowel sounds are normal. He exhibits distension. There is tenderness. There is no guarding.       Genitourinary:   Genitourinary Comments: deferred   Musculoskeletal: Normal range of motion. He exhibits no edema.   Neurological: He is alert and oriented to person, place, and time.   Skin: Skin is warm and dry. Capillary refill takes 2 to 3 seconds. He is not diaphoretic.   Psychiatric: He has a normal mood and affect. His behavior is normal. Judgment and thought content normal.       Significant Labs:   Recent Lab Results        12/23/17  1154 12/23/17  0529 12/23/17  0514 12/23/17  0029      Anion Gap   9      Aniso   Slight      Basophil%   0.0      BUN, Bld   8      Calcium   8.1(L)      Chloride   105      CO2   22(L)      Creatinine   0.6      Differential Method   Manual      eGFR if    >60      eGFR if non    >60  Comment:  Calculation used to obtain the estimated glomerular filtration  rate (eGFR) is the CKD-EPI equation.         Eosinophil%   1.0      Glucose   101      Gran%   79.0(H)      Hematocrit   32.5(L)      Hemoglobin   11.1(L)      Hypo   Occasional      Lymph%   12.0(L)      Magnesium   1.6      MCH   28.8      MCHC   34.2      MCV   84      Metamyelocytes   1.0      Mono%   5.0      MPV   8.9(L)      Myelocytes   2.0      Ovalocytes   Occasional      Platelet Estimate   Appears normal      Platelets   206      POCT Glucose 120(H) 103  107     Poik   Slight      Potassium   3.4(L)      RBC   3.86(L)      RDW   14.1      Sodium   136      Tear Drop Cells   Occasional      WBC   10.70            Significant Imaging:   Imaging Results          X-Ray Chest 1 View (Final result)  Result time 12/20/17 09:32:05    Final result by Clifford Vidal MD (12/20/17 09:32:05)                 Impression:      Stable chest x-ray except for removal of the endotracheal tube.      Electronically signed by: CLIFFORD VIDAL MD  Date:     12/20/17  Time:    09:32              Narrative:    Exam: XR CHEST 1 VIEW,    Date:  12/20/17 08:25:34    History: atelectasis    Comparison:  12/19/2017.    Findings: The endotracheal tube has been removed.  The left central line and nasogastric tubes remain.  Mild cardiomegaly.  Decreased lung volumes with small pleural effusions and bibasilar atelectasis.                             X-Ray Chest AP Portable (Final result)  Result time 12/19/17 08:41:42    Final result by Demario Lau MD (12/19/17 08:41:42)                 Impression:     No adverse interval  change.      Electronically signed by: DERIC VAZQUEZ MD  Date:     12/19/17  Time:    08:41              Narrative:    History: Line placement    Comparison:  12/18/17    Result: Single view of the chest.   Left-sided jugular central line tip overlies the SVC in good position. No pneumothorax.    Tubes are otherwise stable.  In comparison to the prior study, there is no adverse interval changes.                             X-Ray Chest AP Portable (Final result)  Result time 12/19/17 00:14:03    Final result by Imtiaz Melendez III, MD (12/19/17 00:14:03)                 Impression:     Well positioned endotracheal and nasogastric tube.        Electronically signed by: IMTIAZ MELENDEZ MD  Date:     12/19/17  Time:    00:14              Narrative:    XR CHEST AP PORTABLE    Clinical history: Endotracheal tube placement. Z46.82 Encounter for fitting and adjustment of non-vascular catheter.    Findings: The endotracheal tube is well positioned terminating approximately 4 cm above the vimal. The nasogastric tube appears well positioned in the stomach with the distal most tip excluded from the field-of-view. No pneumothorax or other new pulmonary disease identified.                             FL Gastrografin Enema Water Soluble (Final result)     Abnormal  Result time 12/18/17 17:15:09    Final result by Deric Vazquez MD (12/18/17 17:15:09)                 Impression:      Narrowing at the distal descending colon with a short segment at the descending colon sigmoid junction concerning for neoplasm. Recommend direct visualization.    Extensive diverticulosis..       Electronically signed by: DERIC VAZQUEZ MD  Date:     12/18/17  Time:    17:15              Narrative:    Time of Procedure: 12/18/17 17:05:43  Accession # [id number]    SINGLE CONTRAST BARIUM ENEMA    History:  The abdominal pain and obstruction    Comparison: None.    Technique: Single contrast barium enema performed.  Overhead plain films, spot films, and  real-time fluoroscopy were used to evaluate the colon.      Findings: The exam is limited by retained fecal material and poor patient tolerance. The  film demonstrates no residual contrast in the colon. There is difficulty in filling the sigmoid and ascending colon . There is narrowing at the distal descending colon with a short segment at the descending colon sigmoid junction concerning for neoplasm. Recommend direct visualization.. No colonic dilatation identified. .  Fluoroscopic time was 5.8 minutes and 45 fluoroscopic images were obtained.                             CT Abdomen Pelvis With Contrast (Final result)  Result time 12/18/17 12:16:57    Final result by Waqar Coppola MD (12/18/17 12:16:57)                 Impression:      Distended small bowel and colon.  Can't exclude distal small bowel obstruction.  Small amount of ascites.  Multiple liver lesions suspicious for metastatic disease.  Multiple other non-emergent findings as above.    All CT scans at this facility use dose modulation, iterative reconstruction and/or weight based dosing when appropriate to reduce radiation dose to as low as reasonably achievable.       Electronically signed by: WAQAR COPPOLA MD  Date:     12/18/17  Time:    12:16              Narrative:    Exam: CT ABDOMEN PELVIS WITH CONTRAST,    Date:  12/18/17 11:25:46    History: Abdominal pain with onset gradually one week ago, nausea and vomiting, decreased appetite, abdominal distention    Comparison:  No prior relevant studies available    Findings: There are multiple low-density lesions of the liver which do not have the appearance of simple cysts and are suspicious for metastatic disease, the largest 18 mm.  There is fluid distention of the distal esophagus.  There are small bilateral pleural effusions, right greater than left, with adjacent mild bilateral lower lobe atelectasis.  There are moderately distended fluid-filled loops of small bowel throughout the  abdomen with only the terminal ileum decompressed.  There is distention of the right colon with stool.  Maximum diameter of the cecum is 11 cm.  There is moderate gaseous distention of the transverse colon.  Moderate stool in the left colon.  No definite indication of colonic obstruction.  The sigmoid and rectum are decompressed.  There is diverticulosis, severe in the sigmoid.  Small amount of low-density free fluid in the pelvis.  No evidence of free air.  The bowel pattern may be on the basis of enteritis/ileus.  Can't exclude distal small bowel obstruction.  No abnormality of the spleen, pancreas, gallbladder, adrenals, or left kidney is seen.  Small right renal cysts.                             X-Ray Chest AP Portable (Final result)  Result time 12/18/17 09:25:30    Final result by Waqar Coppola MD (12/18/17 09:25:30)                 Impression:     See above      Electronically signed by: WAQAR COPPOLA MD  Date:     12/18/17  Time:    09:25              Narrative:    History: Nausea    No prior studies.    Normal heart size.  There is mild bibasilar atelectasis.  There is blunting of the costophrenic angles consistent with small bibasilar effusions/mild pleural thickening.                            Assessment/Plan:      * Bowel obstruction    -Concerning for possible obstructing mass.   -Discussed plan of care with Dr. Ballard.   -Keep patient NPO for possible surgery.   -NG tube placement due to persistent vomiting.    12/19 - Patient s/p colectomy. Tolerated procedure awaiting pathology - Likely CA  12/20 - Sx on consult. Path pending. Heme Onc on consult  12/21 - Heme/Onc following.      --CEA in the morning     --Pathology shows: Metastatic adenocarcinoma consistent with colon primary, Moderately-differentiated adenocarcinoma, and 3 of 3 lymph nodes positive for metastatic carcinoma.     --staging PET to be performed at [facility]  12/23/17: will continue care at [facility] upon discharge          Hyperglycemia,  unspecified    12/20 - Controlled / NISS          S/P colostomy    Surgery following  --stoma pink. Colostomy bag draining brown stool.  --12/21: started on clear liquid diet today.  --12/22: pt tolerating regular diet today.  -12/23/17: tolerating full liquid diet            Atrial fibrillation with rapid ventricular response    12/20 - Controlled / Monitor / BB PRN    12/23/17: rate controlled, continue current plan of care, telemetry monitoring          Small bowel ischemia    S/p rsx POD#1    12/20 - POD # 2 Await resumption of bowel function.   12/21 - POD #3 stool present in colostomy bag. Clear liquid diet initiated  12/22 - POD #4 stoma pink, colostomy bag with small amt stool and red blood. Pt started on regular diet          Malignant neoplasm of descending colon    12/19 - Awaiting pathology. Discussed with Family. Plan for Heme Onc  12/20 - Path pending  12/21 - path pending. Heme/Onc following. Pt accepted at [facility] post discharge  12/22   --Pathology shows: Metastatic adenocarcinoma consistent with colon primary, Moderately-differentiated adenocarcinoma, and 3 of 3 lymph nodes positive for metastatic carcinoma.          Liver masses    -Possibly represent metastasis associated with obstructing mass.   -Further work up will be planned as outpatient, if needed.     12/19 - Bx pending  12/20 - Bx pending  12/21 - Bx pending  12/22 --Pathology shows: Metastatic adenocarcinoma consistent with colon primary, Moderately-differentiated adenocarcinoma, and 3 of 3 lymph nodes positive for metastatic carcinoma.    12/23/17: will continue care at [facility] upon discharge          VTE Risk Mitigation         Ordered     enoxaparin injection 40 mg  Every 24 hours (non-standard times)     Route:  Subcutaneous        12/18/17 2325     Reason for No Pharmacological VTE Prophylaxis  Once      12/18/17 2325     Medium Risk of VTE  Once      12/18/17 2325     Place sequential compression device  Until discontinued      12/18/17  1621              Milagros Maciel, DNP, ACNP-Bc, CCRN  Department of [facility] Medicine   [facility]

## 2017-12-23 NOTE — PT/OT/SLP PROGRESS
Occupational Therapy      Patient Name:  Herb Sumner   MRN:  [id number]    Patient not seen today secondary to  (PT REFUSED TX STATING THAT HE FEELS IT IS UNNECESSARY.  PT FEELS HE IS DOING WELL & STATED THAT HE WALKS UP & DOWN THE RODRIGUEZ WITH HIS DTR.). PLAN TO D/C PT FROM OT PER PT REQUEST.  Will follow-up IF MD FEELS TX SHOULD CONT &  RECONSULTS OT.    Zari Parrish OT  12/23/2017

## 2017-12-23 NOTE — PROGRESS NOTES
[facility]  Hematology/Oncology  Progress Note    Patient Name: Herb Sumner  Admission Date: 12/18/2017  Hospital Length of Stay: 5 days  Code Status: Full Code     Subjective:     HPI:    78 yo WM with a PMH of HLD and daily mild etoh consumption.  He presented to [facility] ED last night with complaints of 2 week hx of worsening constant abd pain, abd distention, N/V, loss of appetite, malaise and obstipation of mod to severe severity.  CT Abd in ED showed multiple liver lesions as well as distended small bowel and colon, Na 125.  Surgery consulted and patient taken to OR on 12/18/17 by Dr. Ballard finding severely dilated small bowel was found to be ischemic due to pressure/distension. The entire colon was severely distended, and the malignant stricture was noted at the distal end of descending colon. Liver metastatic lesions were noted and excisional biopsy done.  Final pathology remains pending    Patient remains nothing by mouth with NG tube in place.    Interval History: Extensive conversation with the patient's daughter at the bedside reviewed path reports imaging studies talked about  metastatic colon carcinoma stage IV highly treatable but not curable malignancy    Oncology Treatment Plan:   [No treatment plan]    Medications:  Continuous Infusions:   sodium chloride 0.9% 50 mL/hr at 12/22/17 0313     Scheduled Meds:   calcium gluconate IVPB  1,000 mg Intravenous Once    ciprofloxacin  400 mg Intravenous Q12H    enoxparin  40 mg Subcutaneous Q24H    famotidine  20 mg Intravenous BID    metoprolol  5 mg Intravenous Q6H    metronidazole  500 mg Intravenous Q8H    nozaseptin   Each Nare BID     PRN Meds:dextrose 50%, diphenhydrAMINE, glucagon (human recombinant), hydrALAZINE, insulin aspart, morphine, ondansetron, promethazine     Review of Systems   Constitutional: Positive for activity change and fatigue. Negative for appetite change, chills, diaphoresis, fever and unexpected weight  change.   HENT: Negative for congestion, dental problem, drooling, ear discharge, ear pain, facial swelling, hearing loss, mouth sores, nosebleeds, postnasal drip, rhinorrhea, sinus pressure, sneezing, sore throat, tinnitus, trouble swallowing and voice change.    Eyes: Negative for photophobia, pain, discharge, redness, itching and visual disturbance.   Respiratory: Negative for apnea, cough, choking, chest tightness, shortness of breath, wheezing and stridor.    Cardiovascular: Negative for chest pain, palpitations and leg swelling.   Gastrointestinal: Positive for abdominal pain. Negative for abdominal distention, anal bleeding, blood in stool, constipation, diarrhea, nausea, rectal pain and vomiting.   Endocrine: Negative for cold intolerance, heat intolerance, polydipsia, polyphagia and polyuria.   Genitourinary: Negative for decreased urine volume, difficulty urinating, discharge, dysuria, enuresis, flank pain, frequency, genital sores, hematuria, penile pain, penile swelling, scrotal swelling, testicular pain and urgency.   Musculoskeletal: Negative for arthralgias, back pain, gait problem, joint swelling, myalgias, neck pain and neck stiffness.   Skin: Negative for color change, pallor, rash and wound.   Allergic/Immunologic: Negative for environmental allergies, food allergies and immunocompromised state.   Neurological: Positive for weakness. Negative for dizziness, tremors, seizures, syncope, facial asymmetry, speech difficulty, light-headedness, numbness and headaches.   Hematological: Negative for adenopathy. Does not bruise/bleed easily.   Psychiatric/Behavioral: Positive for dysphoric mood. Negative for agitation, behavioral problems, confusion, decreased concentration, hallucinations, self-injury, sleep disturbance and suicidal ideas. The patient is nervous/anxious. The patient is not hyperactive.      Objective:     Vital Signs (Most Recent):  Temp: 98.8 °F (37.1 °C) (12/23/17 0730)  Pulse: 74  (12/23/17 0730)  Resp: 14 (12/23/17 0730)  BP: (!) 140/63 (12/23/17 0730)  SpO2: 95 % (12/23/17 0730) Vital Signs (24h Range):  Temp:  [97.6 °F (36.4 °C)-98.8 °F (37.1 °C)] 98.8 °F (37.1 °C)  Pulse:  [71-79] 74  Resp:  [14-18] 14  SpO2:  [92 %-97 %] 95 %  BP: (140-168)/(63-79) 140/63     Weight: 76.5 kg (168 lb 10.4 oz)  Body mass index is 28.07 kg/m².  Body surface area is 1.87 meters squared.      Intake/Output Summary (Last 24 hours) at 12/23/17 0832  Last data filed at 12/23/17 0600   Gross per 24 hour   Intake          2361.67 ml   Output              100 ml   Net          2261.67 ml       Physical Exam   Constitutional: He is oriented to person, place, and time. He appears distressed.   HENT:   Head: Normocephalic.   Right Ear: External ear normal.   Left Ear: External ear normal.   Nose: Nose normal. Right sinus exhibits no maxillary sinus tenderness and no frontal sinus tenderness. Left sinus exhibits no maxillary sinus tenderness and no frontal sinus tenderness.   Mouth/Throat: Oropharynx is clear and moist. No oropharyngeal exudate.   Eyes: EOM and lids are normal. Pupils are equal, round, and reactive to light. Right eye exhibits no discharge. Left eye exhibits no discharge. Right conjunctiva is not injected. Right conjunctiva has no hemorrhage. Left conjunctiva is not injected. Left conjunctiva has no hemorrhage. No scleral icterus. Right eye exhibits normal extraocular motion. Left eye exhibits normal extraocular motion.   Neck: Normal range of motion. Neck supple. No JVD present. No tracheal deviation present. No thyromegaly present.   Cardiovascular: Normal rate and regular rhythm.    Pulmonary/Chest: Effort normal. No stridor. No respiratory distress.   Abdominal: Soft. He exhibits no mass. There is no hepatosplenomegaly, splenomegaly or hepatomegaly. There is no tenderness.   Colostomy functioning   Musculoskeletal: Normal range of motion. He exhibits no edema or tenderness.   Lymphadenopathy:         Head (right side): No posterior auricular and no occipital adenopathy present.        Head (left side): No posterior auricular and no occipital adenopathy present.     He has no cervical adenopathy.        Right cervical: No superficial cervical, no deep cervical and no posterior cervical adenopathy present.       Left cervical: No superficial cervical, no deep cervical and no posterior cervical adenopathy present.     He has no axillary adenopathy.        Right: No supraclavicular adenopathy present.        Left: No supraclavicular adenopathy present.   Neurological: He is alert and oriented to person, place, and time. He has normal strength. No cranial nerve deficit. Coordination normal.   Skin: Skin is dry. No rash noted. He is not diaphoretic. No cyanosis or erythema. Nails show no clubbing.   Psychiatric: He has a normal mood and affect. His behavior is normal. Judgment and thought content normal. Cognition and memory are normal.   Vitals reviewed.      Significant Labs:   BMP:   Recent Labs  Lab 12/22/17 0237 12/23/17 0514   GLU 98 101    136   K 3.5 3.4*    105   CO2 24 22*   BUN 9 8   CREATININE 0.6 0.6   CALCIUM 8.1* 8.1*   MG 1.8 1.6   , CBC:   Recent Labs  Lab 12/22/17 0237 12/23/17  0514   WBC 10.56 10.70   HGB 11.1* 11.1*   HCT 32.6* 32.5*    206    and CMP:   Recent Labs  Lab 12/22/17 0237 12/23/17  0514    136   K 3.5 3.4*    105   CO2 24 22*   GLU 98 101   BUN 9 8   CREATININE 0.6 0.6   CALCIUM 8.1* 8.1*   ANIONGAP 8 9   EGFRNONAA >60 >60       Diagnostic Results:  I have reviewed and interpreted all pertinent imaging results/findings within the past 24 hours.    Assessment/Plan:     Malignant neoplasm of descending colon    77-year-old gentleman who presented with bowel obstruction secondary to distal descending colon mass with liver lesions consistent with metastatic colorectal cancer.  The patient is status post resection and liver biopsy with final pathology  still pending. The patient and family verbalized their wishes to be evaluated at [facility] after discharge.  I assured them that medical records and pathology reports will be available for [facility] evaluation ASAP.     --CEA pending  --Follow-up on final pathology  --staging PET to be performed at [facility] ; 12/23/1735 minute face-to-face conversation with patient's daughter and patient reviewed diagnosis of metastatic colon carcinoma stage IV treatable but not curable malignancy articles from up-to-date sent to her for review copy of her path report given to also establish a for my [facility] for a wish to go to [email or web address] will be happy to cooperate with any consultation and if any type of treatment is given in the [facility] will happy to facilitate.  Answered questions face-to-face time 35 minutes            Thank you for your consult. I will follow-up with patient. Please contact us if you have any additional questions.     Boogie Hensley MD  Hematology/Oncology  [facility]

## 2017-12-23 NOTE — ASSESSMENT & PLAN NOTE
77-year-old gentleman who presented with bowel obstruction secondary to distal descending colon mass with liver lesions consistent with metastatic colorectal cancer.  The patient is status post resection and liver biopsy with final pathology still pending. The patient and family verbalized their wishes to be evaluated at [facility] after discharge.  I assured them that medical records and pathology reports will be available for [facility] evaluation ASAP.     --CEA pending  --Follow-up on final pathology  --staging PET to be performed at [facility] ; 12/23/1735 minute face-to-face conversation with patient's daughter and patient reviewed diagnosis of metastatic colon carcinoma stage IV treatable but not curable malignancy articles from up-to-date sent to her for review copy of her path report given to also establish a for my [facility] for a wish to go to [facility] will be happy to cooperate with any consultation and if any type of treatment is given in the [place] area will happy to facilitate.  Answered questions face-to-face time 35 minutes

## 2017-12-23 NOTE — PT/OT/SLP PROGRESS
"Physical Therapy  Treatment    Herb Sumner   MRN: [id number]   Admitting Diagnosis: Bowel obstruction    PT Received On: 12/23/17  PT Start Time: 1322     PT Stop Time: 1330    PT Total Time (min): 8 min       Billable Minutes:  Gait Training 8    Treatment Type: Treatment  PT/PTA: PTA     PTA Visit Number: 1       General Precautions: Standard, fall  Orthopedic Precautions: N/A   Braces:      Do you have any cultural, spiritual, Jew conflicts, given your current situation?: NA    Subjective:  Communicated with epic and charge nurse prior to session.  Okay for tx despire abnormal EKG. Pt states that he doesn't need help , his daughter had been walking with him. Wife in the room. She uses a cane from an old CVA and has dysarthria. Pt presents in bed and beginning to get OOB the restroom. Declined assistance to manage the IV etc.     Pain/Comfort  Pain Rating 1: 0/10  Pain Rating Post-Intervention 2: 0/10    Objective:   Patient found with: peripheral IV, colostomy    Functional Mobility:  Bed Mobility:    Observed pt's mobility while getting up to go to the restroom. Supine to sit with SBA. Slow movements.     Transfers: Sit to stand from the EOB with CGA , provided by his wife. Mild unsteadiness was noted. Pt steadied self with the counter top.       Gait: Ambulated 10 feet from the bed to the restroom. "furntiure walked". Did not appear to be at risk for falling. I         Stairs:      Balance:   Static Sit:   Dynamic Sit:   Static Stand:   Dynamic stand:      Therapeutic Activities and Exercises:       AM-PAC 6 CLICK MOBILITY  How much help from another person does this patient currently need?   1 = Unable, Total/Dependent Assistance  2 = A lot, Maximum/Moderate Assistance  3 = A little, Minimum/Contact Guard/Supervision  4 = None, Modified [place]/Independent    Turning over in bed (including adjusting bedclothes, sheets and blankets)?: 4  Sitting down on and standing up from a chair with arms (e.g., " wheelchair, bedside commode, etc.): 4  Moving from lying on back to sitting on the side of the bed?: 4  Moving to and from a bed to a chair (including a wheelchair)?: 3  Need to walk in hospital room?: 3  Climbing 3-5 steps with a railing?: 1 (NT)  Total Score: 19    AM-PAC Raw Score CMS G-Code Modifier Level of Impairment Assistance   6 % Total / Unable   7 - 9 CM 80 - 100% Maximal Assist   10 - 14 CL 60 - 80% Moderate Assist   15 - 19 CK 40 - 60% Moderate Assist   20 - 22 CJ 20 - 40% Minimal Assist   23 CI 1-20% SBA / CGA   24 CH 0% Independent/ Mod I     Patient left standing in the restooom  with all lines intact and wife present.    Assessment:  Herb Sumner is a 77 y.o. male with a medical diagnosis of Bowel obstruction and presents with improved mobility compared to evaluation day.    Rehab identified problem list/impairments: Rehab identified problem list/impairments: weakness, impaired functional mobilty, gait instability    Rehab potential is excellent.    Activity tolerance: Excellent    Discharge recommendations: Discharge Facility/Level Of Care Needs: home with home health, home health PT     Barriers to discharge:      Equipment recommendations:       GOALS:    Physical Therapy Goals        Problem: Physical Therapy Goal    Goal Priority Disciplines Outcome Goal Variances Interventions   Physical Therapy Goal     PT/OT, PT      Description:  STG'S FOR PT BY 12/27/17  1. PATIENT WILL COME SUPINE TO SIT SBA  2. PATIENT WILL COME SIT TO STAND SBA  3. PATIENT WILL AMB LEAST AD 150FT RW PRN SBA                    PLAN:    Patient to be seen 5 x/week  to address the above listed problems via gait training, therapeutic activities, therapeutic exercises  Plan of Care expires: 12/26/17  Plan of Care reviewed with: patient, spouse         Melissa Betts, PTA  12/23/2017

## 2017-12-23 NOTE — SUBJECTIVE & OBJECTIVE
Interval History: Tolerating clear liquids. Stoma pink with colostomy bag in place, liquid brown stool. Abd distention with hypoactive bowel sounds x all quads. Discharge planning with home health      Review of Systems   Constitutional: Positive for activity change. Negative for appetite change, chills, diaphoresis, fatigue, fever and unexpected weight change.   HENT: Negative for congestion, nosebleeds, postnasal drip, rhinorrhea, sinus pain, sinus pressure, sore throat and trouble swallowing.    Eyes: Negative for photophobia, discharge and visual disturbance.   Respiratory: Negative for cough, choking, chest tightness, shortness of breath and wheezing.    Cardiovascular: Negative for chest pain, palpitations and leg swelling.   Gastrointestinal: Positive for abdominal distention. Negative for abdominal pain, constipation, diarrhea, nausea and vomiting.   Endocrine: Negative for cold intolerance and heat intolerance.   Genitourinary: Negative for difficulty urinating, discharge, flank pain, frequency, penile pain and urgency.   Musculoskeletal: Negative for arthralgias, back pain, myalgias and neck pain.   Skin: Negative for color change, pallor, rash and wound.   Allergic/Immunologic: Negative for environmental allergies, food allergies and immunocompromised state.   Neurological: Negative for dizziness, seizures, syncope, weakness, light-headedness, numbness and headaches.   Hematological: Negative for adenopathy. Does not bruise/bleed easily.   Psychiatric/Behavioral: Negative for agitation, confusion and hallucinations. The patient is not nervous/anxious.      Objective:     Vital Signs (Most Recent):  Temp: 98.1 °F (36.7 °C) (12/23/17 1616)  Pulse: 74 (12/23/17 1616)  Resp: 18 (12/23/17 1616)  BP: (!) 163/77 (12/23/17 1616)  SpO2: 96 % (12/23/17 1616) Vital Signs (24h Range):  Temp:  [97.9 °F (36.6 °C)-98.8 °F (37.1 °C)] 98.1 °F (36.7 °C)  Pulse:  [67-78] 74  Resp:  [14-18] 18  SpO2:  [94 %-97 %] 96 %  BP:  (140-168)/(63-79) 163/77     Weight: 76.5 kg (168 lb 10.4 oz)  Body mass index is 28.07 kg/m².    Intake/Output Summary (Last 24 hours) at 12/23/17 1750  Last data filed at 12/23/17 1700   Gross per 24 hour   Intake          3061.67 ml   Output              100 ml   Net          2961.67 ml      Physical Exam   Constitutional: He is oriented to person, place, and time. He appears well-developed and well-nourished. No distress.   HENT:   Head: Normocephalic and atraumatic.   Eyes: Conjunctivae and EOM are normal. Pupils are equal, round, and reactive to light.   Neck: Normal range of motion. Neck supple.   Cardiovascular: Normal rate, regular rhythm, normal heart sounds and intact distal pulses.  Exam reveals no friction rub.    No murmur heard.  Pulmonary/Chest: Effort normal and breath sounds normal. No respiratory distress. He has no wheezes. He has no rales.   Abdominal: Soft. Bowel sounds are normal. He exhibits distension. There is tenderness. There is no guarding.       Genitourinary:   Genitourinary Comments: deferred   Musculoskeletal: Normal range of motion. He exhibits no edema.   Neurological: He is alert and oriented to person, place, and time.   Skin: Skin is warm and dry. Capillary refill takes 2 to 3 seconds. He is not diaphoretic.   Psychiatric: He has a normal mood and affect. His behavior is normal. Judgment and thought content normal.       Significant Labs:   Recent Lab Results       12/23/17  1154 12/23/17  0529 12/23/17  0514 12/23/17  0029      Anion Gap   9      Aniso   Slight      Basophil%   0.0      BUN, Bld   8      Calcium   8.1(L)      Chloride   105      CO2   22(L)      Creatinine   0.6      Differential Method   Manual      eGFR if    >60      eGFR if non    >60  Comment:  Calculation used to obtain the estimated glomerular filtration  rate (eGFR) is the CKD-EPI equation.         Eosinophil%   1.0      Glucose   101      Gran%   79.0(H)      Hematocrit    32.5(L)      Hemoglobin   11.1(L)      Hypo   Occasional      Lymph%   12.0(L)      Magnesium   1.6      MCH   28.8      MCHC   34.2      MCV   84      Metamyelocytes   1.0      Mono%   5.0      MPV   8.9(L)      Myelocytes   2.0      Ovalocytes   Occasional      Platelet Estimate   Appears normal      Platelets   206      POCT Glucose 120(H) 103  107     Poik   Slight      Potassium   3.4(L)      RBC   3.86(L)      RDW   14.1      Sodium   136      Tear Drop Cells   Occasional      WBC   10.70            Significant Imaging:   Imaging Results          X-Ray Chest 1 View (Final result)  Result time 12/20/17 09:32:05    Final result by Hieu Vidal MD (12/20/17 09:32:05)                 Impression:      Stable chest x-ray except for removal of the endotracheal tube.      Electronically signed by: HIEU VIDAL MD  Date:     12/20/17  Time:    09:32              Narrative:    Exam: XR CHEST 1 VIEW,    Date:  12/20/17 08:25:34    History: atelectasis    Comparison:  12/19/2017.    Findings: The endotracheal tube has been removed.  The left central line and nasogastric tubes remain.  Mild cardiomegaly.  Decreased lung volumes with small pleural effusions and bibasilar atelectasis.                             X-Ray Chest AP Portable (Final result)  Result time 12/19/17 08:41:42    Final result by Deric Lau MD (12/19/17 08:41:42)                 Impression:     No adverse interval change.      Electronically signed by: DERIC LAU MD  Date:     12/19/17  Time:    08:41              Narrative:    History: Line placement    Comparison:  12/18/17    Result: Single view of the chest.   Left-sided jugular central line tip overlies the SVC in good position. No pneumothorax.    Tubes are otherwise stable.  In comparison to the prior study, there is no adverse interval changes.                             X-Ray Chest AP Portable (Final result)  Result time 12/19/17 00:14:03    Final result by Anjel Ji  III, MD (12/19/17 00:14:03)                 Impression:     Well positioned endotracheal and nasogastric tube.        Electronically signed by: IMTIAZ MELENDEZ MD  Date:     12/19/17  Time:    00:14              Narrative:    XR CHEST AP PORTABLE    Clinical history: Endotracheal tube placement. Z46.82 Encounter for fitting and adjustment of non-vascular catheter.    Findings: The endotracheal tube is well positioned terminating approximately 4 cm above the vimal. The nasogastric tube appears well positioned in the stomach with the distal most tip excluded from the field-of-view. No pneumothorax or other new pulmonary disease identified.                             FL Gastrografin Enema Water Soluble (Final result)     Abnormal  Result time 12/18/17 17:15:09    Final result by Demario Vazquez MD (12/18/17 17:15:09)                 Impression:      Narrowing at the distal descending colon with a short segment at the descending colon sigmoid junction concerning for neoplasm. Recommend direct visualization.    Extensive diverticulosis..       Electronically signed by: DEMARIO VAZQUEZ MD  Date:     12/18/17  Time:    17:15              Narrative:    Time of Procedure: 12/18/17 17:05:43  Accession # [id number]    SINGLE CONTRAST BARIUM ENEMA    History:  The abdominal pain and obstruction    Comparison: None.    Technique: Single contrast barium enema performed.  Overhead plain films, spot films, and real-time fluoroscopy were used to evaluate the colon.      Findings: The exam is limited by retained fecal material and poor patient tolerance. The  film demonstrates no residual contrast in the colon. There is difficulty in filling the sigmoid and ascending colon . There is narrowing at the distal descending colon with a short segment at the descending colon sigmoid junction concerning for neoplasm. Recommend direct visualization.. No colonic dilatation identified. .  Fluoroscopic time was 5.8 minutes and 45 fluoroscopic  images were obtained.                             CT Abdomen Pelvis With Contrast (Final result)  Result time 12/18/17 12:16:57    Final result by Waqar Coppola MD (12/18/17 12:16:57)                 Impression:      Distended small bowel and colon.  Can't exclude distal small bowel obstruction.  Small amount of ascites.  Multiple liver lesions suspicious for metastatic disease.  Multiple other non-emergent findings as above.    All CT scans at this facility use dose modulation, iterative reconstruction and/or weight based dosing when appropriate to reduce radiation dose to as low as reasonably achievable.       Electronically signed by: WAQAR COPPOLA MD  Date:     12/18/17  Time:    12:16              Narrative:    Exam: CT ABDOMEN PELVIS WITH CONTRAST,    Date:  12/18/17 11:25:46    History: Abdominal pain with onset gradually one week ago, nausea and vomiting, decreased appetite, abdominal distention    Comparison:  No prior relevant studies available    Findings: There are multiple low-density lesions of the liver which do not have the appearance of simple cysts and are suspicious for metastatic disease, the largest 18 mm.  There is fluid distention of the distal esophagus.  There are small bilateral pleural effusions, right greater than left, with adjacent mild bilateral lower lobe atelectasis.  There are moderately distended fluid-filled loops of small bowel throughout the abdomen with only the terminal ileum decompressed.  There is distention of the right colon with stool.  Maximum diameter of the cecum is 11 cm.  There is moderate gaseous distention of the transverse colon.  Moderate stool in the left colon.  No definite indication of colonic obstruction.  The sigmoid and rectum are decompressed.  There is diverticulosis, severe in the sigmoid.  Small amount of low-density free fluid in the pelvis.  No evidence of free air.  The bowel pattern may be on the basis of enteritis/ileus.  Can't exclude  distal small bowel obstruction.  No abnormality of the spleen, pancreas, gallbladder, adrenals, or left kidney is seen.  Small right renal cysts.                             X-Ray Chest AP Portable (Final result)  Result time 12/18/17 09:25:30    Final result by Waqar Coppola MD (12/18/17 09:25:30)                 Impression:     See above      Electronically signed by: WAQAR COPPOLA MD  Date:     12/18/17  Time:    09:25              Narrative:    History: Nausea    No prior studies.    Normal heart size.  There is mild bibasilar atelectasis.  There is blunting of the costophrenic angles consistent with small bibasilar effusions/mild pleural thickening.

## 2017-12-23 NOTE — ASSESSMENT & PLAN NOTE
12/19 - Awaiting pathology. Discussed with Family. Plan for Heme Onc  12/20 - Path pending  12/21 - path pending. Heme/Onc following. Pt accepted at [facility] post discharge  12/22   --Pathology shows: Metastatic adenocarcinoma consistent with colon primary, Moderately-differentiated adenocarcinoma, and 3 of 3 lymph nodes positive for metastatic carcinoma.

## 2017-12-23 NOTE — SUBJECTIVE & OBJECTIVE
Interval History: Extensive conversation with the patient's daughter at the bedside reviewed path reports imaging studies talked about  metastatic colon carcinoma stage IV highly treatable but not curable malignancy    Oncology Treatment Plan:   [No treatment plan]    Medications:  Continuous Infusions:   sodium chloride 0.9% 50 mL/hr at 12/22/17 0313     Scheduled Meds:   calcium gluconate IVPB  1,000 mg Intravenous Once    ciprofloxacin  400 mg Intravenous Q12H    enoxparin  40 mg Subcutaneous Q24H    famotidine  20 mg Intravenous BID    metoprolol  5 mg Intravenous Q6H    metronidazole  500 mg Intravenous Q8H    nozaseptin   Each Nare BID     PRN Meds:dextrose 50%, diphenhydrAMINE, glucagon (human recombinant), hydrALAZINE, insulin aspart, morphine, ondansetron, promethazine     Review of Systems   Constitutional: Positive for activity change and fatigue. Negative for appetite change, chills, diaphoresis, fever and unexpected weight change.   HENT: Negative for congestion, dental problem, drooling, ear discharge, ear pain, facial swelling, hearing loss, mouth sores, nosebleeds, postnasal drip, rhinorrhea, sinus pressure, sneezing, sore throat, tinnitus, trouble swallowing and voice change.    Eyes: Negative for photophobia, pain, discharge, redness, itching and visual disturbance.   Respiratory: Negative for apnea, cough, choking, chest tightness, shortness of breath, wheezing and stridor.    Cardiovascular: Negative for chest pain, palpitations and leg swelling.   Gastrointestinal: Positive for abdominal pain. Negative for abdominal distention, anal bleeding, blood in stool, constipation, diarrhea, nausea, rectal pain and vomiting.   Endocrine: Negative for cold intolerance, heat intolerance, polydipsia, polyphagia and polyuria.   Genitourinary: Negative for decreased urine volume, difficulty urinating, discharge, dysuria, enuresis, flank pain, frequency, genital sores, hematuria, penile pain, penile  swelling, scrotal swelling, testicular pain and urgency.   Musculoskeletal: Negative for arthralgias, back pain, gait problem, joint swelling, myalgias, neck pain and neck stiffness.   Skin: Negative for color change, pallor, rash and wound.   Allergic/Immunologic: Negative for environmental allergies, food allergies and immunocompromised state.   Neurological: Positive for weakness. Negative for dizziness, tremors, seizures, syncope, facial asymmetry, speech difficulty, light-headedness, numbness and headaches.   Hematological: Negative for adenopathy. Does not bruise/bleed easily.   Psychiatric/Behavioral: Positive for dysphoric mood. Negative for agitation, behavioral problems, confusion, decreased concentration, hallucinations, self-injury, sleep disturbance and suicidal ideas. The patient is nervous/anxious. The patient is not hyperactive.      Objective:     Vital Signs (Most Recent):  Temp: 98.8 °F (37.1 °C) (12/23/17 0730)  Pulse: 74 (12/23/17 0730)  Resp: 14 (12/23/17 0730)  BP: (!) 140/63 (12/23/17 0730)  SpO2: 95 % (12/23/17 0730) Vital Signs (24h Range):  Temp:  [97.6 °F (36.4 °C)-98.8 °F (37.1 °C)] 98.8 °F (37.1 °C)  Pulse:  [71-79] 74  Resp:  [14-18] 14  SpO2:  [92 %-97 %] 95 %  BP: (140-168)/(63-79) 140/63     Weight: 76.5 kg (168 lb 10.4 oz)  Body mass index is 28.07 kg/m².  Body surface area is 1.87 meters squared.      Intake/Output Summary (Last 24 hours) at 12/23/17 0832  Last data filed at 12/23/17 0600   Gross per 24 hour   Intake          2361.67 ml   Output              100 ml   Net          2261.67 ml       Physical Exam   Constitutional: He is oriented to person, place, and time. He appears distressed.   HENT:   Head: Normocephalic.   Right Ear: External ear normal.   Left Ear: External ear normal.   Nose: Nose normal. Right sinus exhibits no maxillary sinus tenderness and no frontal sinus tenderness. Left sinus exhibits no maxillary sinus tenderness and no frontal sinus tenderness.    Mouth/Throat: Oropharynx is clear and moist. No oropharyngeal exudate.   Eyes: EOM and lids are normal. Pupils are equal, round, and reactive to light. Right eye exhibits no discharge. Left eye exhibits no discharge. Right conjunctiva is not injected. Right conjunctiva has no hemorrhage. Left conjunctiva is not injected. Left conjunctiva has no hemorrhage. No scleral icterus. Right eye exhibits normal extraocular motion. Left eye exhibits normal extraocular motion.   Neck: Normal range of motion. Neck supple. No JVD present. No tracheal deviation present. No thyromegaly present.   Cardiovascular: Normal rate and regular rhythm.    Pulmonary/Chest: Effort normal. No stridor. No respiratory distress.   Abdominal: Soft. He exhibits no mass. There is no hepatosplenomegaly, splenomegaly or hepatomegaly. There is no tenderness.   Colostomy functioning   Musculoskeletal: Normal range of motion. He exhibits no edema or tenderness.   Lymphadenopathy:        Head (right side): No posterior auricular and no occipital adenopathy present.        Head (left side): No posterior auricular and no occipital adenopathy present.     He has no cervical adenopathy.        Right cervical: No superficial cervical, no deep cervical and no posterior cervical adenopathy present.       Left cervical: No superficial cervical, no deep cervical and no posterior cervical adenopathy present.     He has no axillary adenopathy.        Right: No supraclavicular adenopathy present.        Left: No supraclavicular adenopathy present.   Neurological: He is alert and oriented to person, place, and time. He has normal strength. No cranial nerve deficit. Coordination normal.   Skin: Skin is dry. No rash noted. He is not diaphoretic. No cyanosis or erythema. Nails show no clubbing.   Psychiatric: He has a normal mood and affect. His behavior is normal. Judgment and thought content normal. Cognition and memory are normal.   Vitals reviewed.      Significant  Labs:   BMP:   Recent Labs  Lab 12/22/17 0237 12/23/17 0514   GLU 98 101    136   K 3.5 3.4*    105   CO2 24 22*   BUN 9 8   CREATININE 0.6 0.6   CALCIUM 8.1* 8.1*   MG 1.8 1.6   , CBC:   Recent Labs  Lab 12/22/17 0237 12/23/17 0514   WBC 10.56 10.70   HGB 11.1* 11.1*   HCT 32.6* 32.5*    206    and CMP:   Recent Labs  Lab 12/22/17 0237 12/23/17 0514    136   K 3.5 3.4*    105   CO2 24 22*   GLU 98 101   BUN 9 8   CREATININE 0.6 0.6   CALCIUM 8.1* 8.1*   ANIONGAP 8 9   EGFRNONAA >60 >60       Diagnostic Results:  I have reviewed and interpreted all pertinent imaging results/findings within the past 24 hours.

## 2017-12-23 NOTE — ASSESSMENT & PLAN NOTE
-Possibly represent metastasis associated with obstructing mass.   -Further work up will be planned as outpatient, if needed.     12/19 - Bx pending  12/20 - Bx pending  12/21 - Bx pending  12/22 --Pathology shows: Metastatic adenocarcinoma consistent with colon primary, Moderately-differentiated adenocarcinoma, and 3 of 3 lymph nodes positive for metastatic carcinoma.    12/23/17: will continue care at [facility] upon discharge

## 2017-12-23 NOTE — PLAN OF CARE
Problem: Patient Care Overview  Goal: Plan of Care Review  Outcome: Ongoing (interventions implemented as appropriate)  Remained free from injury. Tolerating diet. Educated on colostomy maintenance, self-care encouraged. Ambulating in mares with walker assistance. Denies pain. 12 hour chart check complete.

## 2017-12-23 NOTE — PROGRESS NOTES
The patient was seen and examined.  POD #4 exploration with Salter's procedure.  Pathology back: Colon cancer with obstruction and metastasis to liver.  Colostomy functioning and tolerate po intake.  Still distended abdomen, not completely resolved abdominal distention.    Continue current management and ambulation.  Likely to be discharged tomorrow.  Expected to see oncology at [facility].  \  David Ballard

## 2017-12-23 NOTE — ASSESSMENT & PLAN NOTE
12/20 - Controlled / Monitor / BB PRN    12/23/17: rate controlled, continue current plan of care, telemetry monitoring

## 2017-12-24 VITALS
HEIGHT: 65 IN | HEART RATE: 70 BPM | DIASTOLIC BLOOD PRESSURE: 64 MMHG | SYSTOLIC BLOOD PRESSURE: 137 MMHG | WEIGHT: 168.63 LBS | TEMPERATURE: 98 F | OXYGEN SATURATION: 94 % | BODY MASS INDEX: 28.1 KG/M2 | RESPIRATION RATE: 14 BRPM

## 2017-12-24 LAB
POCT GLUCOSE: 107 MG/DL (ref 70–110)
POCT GLUCOSE: 130 MG/DL (ref 70–110)

## 2017-12-24 PROCEDURE — 99233 SBSQ HOSP IP/OBS HIGH 50: CPT | Mod: ,,, | Performed by: INTERNAL MEDICINE

## 2017-12-24 PROCEDURE — 93010 ELECTROCARDIOGRAM REPORT: CPT | Mod: ,,, | Performed by: INTERNAL MEDICINE

## 2017-12-24 PROCEDURE — 25000003 PHARM REV CODE 250: Performed by: NURSE PRACTITIONER

## 2017-12-24 PROCEDURE — 25000003 PHARM REV CODE 250: Performed by: INTERNAL MEDICINE

## 2017-12-24 PROCEDURE — S0028 INJECTION, FAMOTIDINE, 20 MG: HCPCS | Performed by: INTERNAL MEDICINE

## 2017-12-24 PROCEDURE — 93005 ELECTROCARDIOGRAM TRACING: CPT

## 2017-12-24 PROCEDURE — 97530 THERAPEUTIC ACTIVITIES: CPT

## 2017-12-24 RX ADMIN — SODIUM CHLORIDE: 0.9 INJECTION, SOLUTION INTRAVENOUS at 08:12

## 2017-12-24 RX ADMIN — FAMOTIDINE 20 MG: 20 INJECTION, SOLUTION INTRAVENOUS at 08:12

## 2017-12-24 RX ADMIN — METOPROLOL TARTRATE 5 MG: 5 INJECTION INTRAVENOUS at 05:12

## 2017-12-24 NOTE — DISCHARGE INSTRUCTIONS
Discharge Instructions: Changing Your Ostomy Pouch     Carefully push the skin away from the barrier with one hand. Slowly peel back the skin barrier with the other hand.     Your healthcare provider showed you how to change your pouch in the hospital soon after your surgery. This sheet helps you remember the steps you need to follow to change your pouch. As a rule, a drainable pouch needs to be changed every 5 to 7 days. You will be emptying it more often.  Home care  Recommendations for home care include the following:  · Gather the following supplies:  ¨ Plastic bags  ¨ Clean towel  ¨ Toilet paper  ¨ Extra skin protection  ¨ Soft washcloth  ¨ Scissors (if needed)  ¨ New pouch  · Remove the used pouch:  ¨ Sit on or next to the toilet.  ¨ Empty the used pouch into the toilet if necessary.  ¨ Starting at the upper edge of the skin barrier, carefully push the skin away from the barrier with one hand. Slowly peel back the skin barrier with the other hand.  ¨ Peel all the way around the skin barrier until the pouch comes off.  ¨ Seal the pouch in a plastic bag; then put it in a second plastic bag. Throw it away in a trash bin.  ·      Wipe the skin around the stoma with toilet paper.     Clean around the stoma:  ¨ Wipe any stool off the skin around the stoma with toilet paper.  ¨ Clean the skin with warm water and a soft washcloth. Wash right up to the edge of the stoma. Pat the skin dry with a clean towel.  ¨ If needed, put on extra skin protection, such as moisture barrier cream or powder.  · Put on the new pouch:  ¨ Peel the backing off the skin barrier.  ¨ Place the precut skin barrier over the stoma. If you dont use a pouch with a precut skin barrier, size and cut the opening (1/16 inch bigger than the stoma) and peel the backing off the skin barrier. Carefully place it over the stoma.  ¨ The pouch opening should point toward your feet.  ¨ If using a pouch with a clamp at the base, it may be easier to apply the  clamp to the pouch first.  ¨ Snap the pouch onto the barrier flange (if you use a 2-piece pouch).  ¨ Press the barrier against your skin. Hold it in place for 45 seconds.  ¨      Place the precut skin barrier over the stoma.     Clamp the tail of the pouch (if drainable or reusable).  ¨ Wash your hands for at least 20 seconds when you are done. (Hum Happy Birthday twice if you need a timer.)   When to call your healthcare provider  Call your healthcare provider right away if you have any of the following:  · Pus, foul-smelling drainage, or excessive bleeding from your stoma  · A stoma that separates from the skin or looks like its getting longer  · A stoma that is recessing (pulling back) into the belly  · Bulging skin around your stoma  · Blood in your stool  · Change in the color of your stoma  · Fever of 100.4°F (38°C) or higher, or chills, or as advised by your healthcare provider  · Nausea or vomiting  · Increased pain in the belly or around the stoma  · No gas or stool made after 24 hours   Date Last Reviewed: 7/1/2016  © 6424-0906 [employer]. [address] [place] [address], [place] [address]. All rights reserved. This information is not intended as a substitute for professional medical care. Always follow your healthcare professional's instructions.        Ostomy Care: Emptying Your Pouch    You need to empty a drainable pouch when it gets to be about one-third full. This keeps the pouch from bulging under your clothes. It also helps prevent leaking and odor. To empty your pouch, follow the steps below in order.  Empty the pouch  Steps to emptying the pouch are as follows:   · Sit on or next to the toilet. Or  front of the toilet. Put a layer of toilet paper in the toilet bowl to keep stool from splashing.  · Pull your clothes away from the pouch.  · Hold the bottom of the pouch up. Open the Velcro closures or remove the clamp and set it aside.  · Slowly unroll the tail, or spout, over the  toilet.  · Bend over the toilet to help prevent splashing   · Slide your fingers down the pouch to push out all the stool.  Clean the pouch  Steps to cleaning the pouch:   · Wipe the inside and outside of the tail with toilet paper. This helps prevent any odor.  · Check both sides of the pouch for tears or holes. If you find any, put on a new pouch.  · If the pouch closes with a clamp, rinse the clamp if there is stool on it.    · If you want to rinse out the pouch, hold the tail up and pour water through it. Hold the tail closed and shake the pouch gently. Then empty the pouch into the toilet.  Close the pouch  If the pouch has Velcro closures:  · Properly fasten the Velcro closures to close the pouch.  If the pouch has a clamp:  · Hold the clamp open with the curved side, or hinge, toward your body.  · Lay the bar, or knife, of the clamp flat on the tail of the pouch, about 1 inch from the bottom of the tail.  · Fold the tail up over the bar. Make sure the tail lies flat against the bar. Also make sure the whole width of the tail is held within the bar. If it isnt, the pouch may leak or smell.  · Hold the tail of the pouch firmly against the bar. Then close the clamp by bringing the hinge up to the bar. Press the two parts together until they lock.  · Wash your hands for at least 20 seconds when you are done. (Hum Happy Birthday twice if you need a timer.)   Releasing gas    Gas can collect in the pouch even if there is no stool. Never puncture the pouch to release gas. If you do, youll break the odorproof seal. Stool can also leak if the pouch is punctured. To release gas, follow these steps:  · Hold the tail of the pouch up slightly and open the Velcro closures or remove the clamp.  · Hold the top of the pouch with one hand.  · With the other hand, push the gas out by sliding your thumb and index finger from the top to the bottom of the pouch.  · Secure the Velcro closures or reclamp the pouch as described  above.  · Wash your hands when you are done.  Date Last Reviewed: 7/1/2016  © 8871-6798 [employer]. [address] [place] [address], [place] [address]. All rights reserved. This information is not intended as a substitute for professional medical care. Always follow your healthcare professional's instructions.        Colostomy: Caring for Your Stoma     Applying an extra skin barrier, such as a wipe, helps protect the skin if stool leaks around the pouch. Wipe it in a [place] around the stoma. Then let it dry for 1 minute before putting on a new pouch.     You need to take care of your stoma and the skin around it (peristomal skin). That means keeping the stoma and the skin clean. It also means protecting the skin from moisture and contact with stool. This helps prevent skin problems and odor.  Check the stoma  Check the stoma and the skin around it each time you change your pouch.  front of a mirror, or use a hand mirror so that you can see all the way around the stoma. It should look shiny, moist, and dark pink or red. The skin around it should be smooth, with no red or broken spots.  Clean around the stoma  Clean around the stoma with warm water and a soft washcloth each time you change the pouch. Water does not harm the stoma. You can even take a bath or shower without your pouch if you choose:  · There are no nerves in the stoma, so there is no feeling. Be sure to clean and dry the stoma gently. You could injure the stoma without knowing it.  · The stoma may bleed a little when you clean it. Thats because there are tiny blood vessels in the tissue.  Protect the skin around the stoma  For the pouch to stick well, the skin around the stoma needs to be dry and smooth. If the skin is moist or uneven, the pouch is more likely to leak. A leaky pouch will irritate the skin. It can also cause odor:  · To help keep the skin healthy, pat it dry after you wash it.  · If you like, apply an extra skin barrier,  such as a wipe, before you put on a new pouch. This helps protect the skin if stool leaks around the pouch.  Common causes of skin problems  Common causes include the following:  · A leaking pouch can make the skin red and weepy. Use a measuring guide to check that the opening on the pouch is the correct size.  · Hair under the pouch can make the skin inflamed. To avoid this, shave off any hair around the stoma with an electric razor. Always shave away from the stoma.  · Allergies to skin barriers can make the skin itch, burn, or sting. You may need to try a new skin barrier or change to a new kind of pouch.  · Yeast infections can make the skin red and itchy. Sweat under the pouch makes these infections more likely. A pouch cover can help keep the skin dry.  Call your enterostomal therapy (ET) nurse or other healthcare provider  Contact your healthcare provider if you have any of the following:  · The skin around the stoma is red, weepy, bleeding, or broken.  · The skin around the stoma itches, burns, stings, or has white spots.  · The stoma swells, changes color, or bleeds without stopping.  · The stoma becomes even with or sinks below the skin, or it sticks up more than normal.   Date Last Reviewed: 8/1/2016  © 7429-1334 [employer]. [address] [place] [address]. All rights reserved. This information is not intended as a substitute for professional medical care. Always follow your healthcare professional's instructions.        Discharge Instructions for Colostomy  You just underwent a procedure that required a colostomy. This is a life-saving procedure that involves removing or disconnecting part of your colon (large intestine). If your large intestine was diseased, your healthcare provider may have removed it. If it was injured, your healthcare provider may have disconnected it for a short time so that it can heal. After it heals, your healthcare provider may reconnect it. During a  colostomy formation, your healthcare provider reroutes your colon through your abdominal wall. Stool and mucus can then pass out of your body through this opening, called a stoma. The following are general guidelines for home care following a colostomy. Your healthcare provider will go over any information that is specific to your condition.  Home care  Suggestions for home care include the following:  · Take care of your stoma as directed. Your healthcare provider and ostomy nurse discussed how to do this with you before you left the hospital.  · Ask your healthcare provider or ostomy nurse for a patient education sheet about colostomy care before you leave the hospital. This will help remind you how to care for yourself.  · If a partner or significant other will be helping you recover, ask the medical team to educate that person on ostomy care as well.   · Dont lift anything heavier than 5 pounds until your healthcare provider says it is OK.  · Dont drive until after your first healthcare providers appointment after your surgery.  · If you ride in a car for more than short trips, stop often to stretch your legs.  · Ask your healthcare provider when you can expect to return to work. Most patients are able to return to work within 4 to 6 weeks after surgery.  · Increase your activity gradually. Take short walks on a level surface.  · Wash your incision site with soap and water and pat it dry.  · Check your incision every day for redness, drainage, swelling, or separation of the skin.  · Take your medicines exactly as directed. Dont skip doses.  · Dont take any over-the-counter medicine unless your healthcare provider tells you to do so.  Call your healthcare provider  Call your healthcare provider immediately if you have any of the following:  · Excessive bleeding from your stoma  · Blood in your stool  · Stool that is very hard  · No gas or stool  · Change in the color of your stoma  · Bulging skin around your  stoma  · A stoma that looks like its getting longer  · Fever of 100.4°F (38°C) or higher, or chills, or as advised by your healthcare provider   · Redness, swelling, bleeding, or drainage from your incision  · Constipation  · Diarrhea  · Nausea or vomiting  · Increased pain in the belly or around the stoma   Date Last Reviewed: 7/1/2016  © 7529-3895 [employer]. [address] [place] [address], [place] [address]. All rights reserved. This information is not intended as a substitute for professional medical care. Always follow your healthcare professional's instructions.      Colostomy care as instructed    Staging PET to be initiated at [facility]

## 2017-12-24 NOTE — PLAN OF CARE
CM consulted but patient is private pay . Patient dtr will need to assist patient with a decision for d/c plans. Dtr not available at the present time.  CM to f/u for private pay SN.

## 2017-12-24 NOTE — ASSESSMENT & PLAN NOTE
77-year-old gentleman who presented with bowel obstruction secondary to distal descending colon mass with liver lesions consistent with metastatic colorectal cancer.  The patient is status post resection and liver biopsy with final pathology still pending. The patient and family verbalized their wishes to be evaluated at [facility] after discharge.  I assured them that medical records and pathology reports will be available for [facility] evaluation ASAP.     --CEA pending  --Follow-up on final pathology  --staging PET to be performed at [facility] ; 12/23/1735 minute face-to-face conversation with patient's daughter and patient reviewed diagnosis of metastatic colon carcinoma stage IV treatable but not curable malignancy articles from up-to-date sent to her for review copy of her path report given to also establish a for my [facility] for a wish to go to [facility] will be happy to cooperate with any consultation and if any type of treatment is given in the [place] area will happy to facilitate.  Answered questions face-to-face time 35 minutes; 12/24/17 an extensive discussion with the patient the patient's wife and the patient himself in terms of the nature of metastatic colon carcinoma the fact that is a treatable but not curable malignancy reviewed that I followed them information from up-to-date in the nature of his illness they have contacted pathology to follow it slides to [facility] the review I've offered them my services here in the [place] area should they have any of their care here to facilitate care.  My recommendations once KEREN testing has been performed on his primary tumor would be direct treatment with the appropriate agents.  Unless we have an active research trial that is open

## 2017-12-24 NOTE — ASSESSMENT & PLAN NOTE
"Surgery following  --stoma pink. Colostomy bag draining brown stool.  --12/21: started on clear liquid diet today.  --12/22: pt tolerating regular diet today.  -12/23/17: tolerating full liquid diet  -12/24/17: tolerating full liquid diet, patient states "i'm ready to go home"      "

## 2017-12-24 NOTE — PLAN OF CARE
12/24/17 1530   Final Note   Assessment Type Final Discharge Note   Discharge Disposition Home   Right Care Referral Info   Post Acute Recommendation No Care

## 2017-12-24 NOTE — DISCHARGE SUMMARY
[facility] Hospital Medicine  Discharge Summary      Patient Name: Herb Sumner  MRN: [id number]  Admission Date: 12/18/2017  Hospital Length of Stay: 6 days  Discharge Date and Time:  12/24/2017 11:13 AM  Attending Physician: Fidel Canada MD   Discharging Provider: OLIVE Richey  Primary Care Provider: Primary Doctor Thi      HPI:   Herb Sumner is a 77 year old male who presented to [facility] with reports of a 2 weeks history of worsening abdominal distention, belching, nausea and vomiting. The patient reports that his abdominal distention was initially mild and progressed over the last two weeks. He had some intermittent relief with vegetable laxatives, but symptoms returned. Today, the patient came to the ED because he could not tolerate even water without vomiting. There is associated frequent belching and hiccups. He reports that his intake has gradually decreased since the onset of symptoms. His last bowel movement was 5 days ago and he last passed gas one day ago. In the ED CT scan of the abdomen showed multiple liver lesions as well as distended small bowel and colon. The patient was also found to have hyponatremia with a sodium of 125. Code status was discussed with the patient who is a full code. The patient is unsure of if or when he last had a colonoscopy. His daughter, Kimberly Sumner is his surrogate medical decision maker.     Procedure(s) (LRB):  LAPAROTOMY-EXPLORATORY (N/A)  COLOSTOMY (Left)  BIOPSY-LIVER (N/A)  RESECTION-COLON      Hospital Course:   12/19 Patient s/p colectomy. Remains intubated. Patient responsive to command can MEYER. Patient denies significant discomfort. ART line inserted. Case discussed with Surgery / Pulmonary CC    12/20 - Patient extubated / communicative. Patient denies complaint. Abdomen remains distended with hypoactive BS. Case discussed with Surgery. Initiate TPN and await return of bowel function. Heme Onc has been  "consulted for Obstructing Colon mass with apparent liver mets. Bx result pending.    12/21: Patient started on clear liquid diet at noon today. He ate broth and jello. Denies N/V and abd pain. Stoma pink with colostomy bag in place with moderate amount of brown stool present. Adb remains distended with hypoactive Bs. Daughter at bedside, reporting they have been accepted at [facility] as soon as pt is discharged. Discussed plan with ROBE Pandya from General Surgery.    12/23/17: Tolerating clear liquids. Stoma pink with colostomy bag in place, liquid brown stool. Abd distention with hypoactive bowel sounds x all quads. Discharge planning with home health    12/24/17: Tolerating clear liquids. Stoma pink with colostomy bag in place, liquid brown stool. Abd distention with hypoactive bowel sounds x all quads. Discussed discharge planning with daughter and patient, daughter reports she and patient has been performing colostomy care, all colostomy supplies are "at home". Will follow-up with staging PET at [facility]. Patient states "I feel better, I'm ready to go home today". Patient seen and examined today and was determined stable for discharge.         Consults:   Consults         Status Ordering Provider     Consult to Case Management/Social Work  Once     Provider:  (Not yet assigned)    Completed LUIS E WASHINGTON     Inpatient consult to Cardiology  Once     Provider:  Reese Lassiter MD    Completed URIEL WANG     Inpatient consult to Hematology/Oncology  Once     Provider:  Sudhir Torres Jr., MD    Acknowledged IMTIAZ ESPINOZA     Inpatient consult to Pulmonology  Once     Provider:  (Not yet assigned)    Completed LUIS E WASHINGTON     Inpatient consult to Registered Dietitian/Nutritionist  Once     Provider:  (Not yet assigned)    Completed IMTIAZ ESPINOZA     Inpatient consult to Registered Dietitian/Nutritionist  Once     Provider:  (Not yet assigned)    Completed IMTIAZ ESPINOZA     Inpatient consult to Registered " "Dietitian/Nutritionist  Once     Provider:  (Not yet assigned)    Completed BRADY ORTEGA     Inpatient consult to Social Work  Once     Provider:  (Not yet assigned)    Completed BRADY BALLESTEROS     Inpatient consult to Social Work  Once     Provider:  (Not yet assigned)    Acknowledged RON ROBERTO          S/P colostomy    Surgery following  --stoma pink. Colostomy bag draining brown stool.  --12/21: started on clear liquid diet today.  --12/22: pt tolerating regular diet today.  -12/23/17: tolerating full liquid diet  -12/24/17: tolerating full liquid diet, patient states "i'm ready to go home"            Malignant neoplasm of descending colon    12/19 - Awaiting pathology. Discussed with Family. Plan for Heme Onc  12/20 - Path pending  12/21 - path pending. Heme/Onc following. Pt accepted at [facility] post discharge  12/22   --Pathology shows: Metastatic adenocarcinoma consistent with colon primary, Moderately-differentiated adenocarcinoma, and 3 of 3 lymph nodes positive for metastatic carcinoma.  12/24/17: Staging PET to be done at [facility]          Liver masses    -Possibly represent metastasis associated with obstructing mass.   -Further work up will be planned as outpatient, if needed.     12/19 - Bx pending  12/20 - Bx pending  12/21 - Bx pending  12/22 --Pathology shows: Metastatic adenocarcinoma consistent with colon primary, Moderately-differentiated adenocarcinoma, and 3 of 3 lymph nodes positive for metastatic carcinoma.    12/23/17: will continue care at [facility] upon discharge  12/24/17: management and care at [facility]          Final Active Diagnoses:    Diagnosis Date Noted POA    PRINCIPAL PROBLEM:  Bowel obstruction [K56.609] 12/18/2017 Yes    Small bowel ischemia [K55.9] 12/19/2017 Yes    Atrial fibrillation with rapid ventricular response [I48.91] 12/19/2017 Yes    S/P colostomy [Z93.3] 12/19/2017 Not Applicable    Hyperglycemia, unspecified [R73.9] 12/19/2017 Yes    Liver masses [R16.0] 12/18/2017 " Yes    Malignant neoplasm of descending colon [C18.6]  Yes      Problems Resolved During this Admission:    Diagnosis Date Noted Date Resolved POA    Acute hypoxemic respiratory failure [J96.01] 12/19/2017 12/20/2017 Yes    Shock [R57.9] 12/19/2017 12/20/2017 Yes    Hypokalemia [E87.6] 12/18/2017 12/20/2017 Yes       Discharged Condition: stable    Disposition: Home or Self Care    Follow Up:  Follow-up Information     Primary Doctor No In 3 days.           David Ballard MD In 1 week.    Specialties:  General Surgery, Bariatrics  Contact information:  [id number]  [place] [id number] [phone]                 Patient Instructions:     Diet full liquid     Activity as tolerated     Notify your health care provider if you experience any of the following:  temperature >100.4     Notify your health care provider if you experience any of the following:  severe uncontrolled pain     Notify your health care provider if you experience any of the following:  redness, tenderness, or signs of infection (pain, swelling, redness, odor or green/yellow discharge around incision site)     Notify your health care provider if you experience any of the following:  persistent nausea and vomiting or diarrhea     No dressing needed   Order Comments: Keep incision clean dry and intact         Significant Diagnostic Studies: Labs:   BMP:   Recent Labs  Lab 12/23/17 0514         K 3.4*      CO2 22*   BUN 8   CREATININE 0.6   CALCIUM 8.1*   MG 1.6   , CMP   Recent Labs  Lab 12/23/17 0514      K 3.4*      CO2 22*      BUN 8   CREATININE 0.6   CALCIUM 8.1*   ANIONGAP 9   ESTGFRAFRICA >60   EGFRNONAA >60   , CBC   Recent Labs  Lab 12/23/17 0514   WBC 10.70   HGB 11.1*   HCT 32.5*      , INR   Lab Results   Component Value Date    INR 1.2 12/18/2017    and Lipid Panel No results found for: CHOL, HDL, LDLCALC, TRIG, CHOLHDL    Pending Diagnostic Studies:     None          Medications:  Reconciled Home Medications:   Current Discharge Medication List      CONTINUE these medications which have NOT CHANGED    Details   ascorbic acid, vitamin C, (VITAMIN C) 100 MG tablet Take 100 mg by mouth once daily.      aspirin (ECOTRIN) 81 MG EC tablet Take 81 mg by mouth once daily.      folic acid (FOLVITE) 1 MG tablet Take 1 mg by mouth once daily.      niacin 100 MG Tab Take 100 mg by mouth every evening.             Indwelling Lines/Drains at time of discharge:   Lines/Drains/Airways     Central Venous Catheter Line                 Percutaneous Central Line Insertion/Assessment - triple lumen  12/19/17 0740 left internal jugular 5 days          Drain                 Colostomy 12/18/17 2300 Descending/sigmoid LLQ 5 days                Time spent on the discharge of patient: 40 minutes  Patient was seen and examined on the date of discharge and determined to be suitable for discharge.         OLIVE Richey  Department of Hospital Medicine  [facility]

## 2017-12-24 NOTE — PROGRESS NOTES
[facility]  Hematology/Oncology  Progress Note    Patient Name: Herb Sumner  Admission Date: 12/18/2017  Hospital Length of Stay: 6 days  Code Status: Full Code     Subjective:     HPI:    78 yo WM with a PMH of HLD and daily mild etoh consumption.  He presented to [facility] last night with complaints of 2 week hx of worsening constant abd pain, abd distention, N/V, loss of appetite, malaise and obstipation of mod to severe severity.  CT Abd in ED showed multiple liver lesions as well as distended small bowel and colon, Na 125.  Surgery consulted and patient taken to OR on 12/18/17 by Dr. Ballard finding severely dilated small bowel was found to be ischemic due to pressure/distension. The entire colon was severely distended, and the malignant stricture was noted at the distal end of descending colon. Liver metastatic lesions were noted and excisional biopsy done.  Final pathology remains pending    Patient remains nothing by mouth with NG tube in place.    Interval History: Extensive conversation this morning with patient daughter and patient's wife again explained the nature of metastatic colon carcinoma that is a treatable but not curable malignancy followed it information yesterday to the daughter on the nature of his diagnosis and information regarding this.  In addition they spoke with urology yesterday to have his slides followed it to [facility] for review.  There family is from the [facility] but 2 of the children live outside.  There is no supporting system here in the [place] area of told the family they certainly can receive outpatient chemotherapy he would probably best be done in an area especially immediately postop with there would be family members to support his strengthening from his extensive surgery and his adjustment to her colostomy    Oncology Treatment Plan:   [No treatment plan]    Medications:  Continuous Infusions:   sodium chloride 0.9% 50 mL/hr at  12/24/17 0858     Scheduled Meds:   calcium gluconate IVPB  1,000 mg Intravenous Once    enoxparin  40 mg Subcutaneous Q24H    famotidine  20 mg Intravenous BID    metoprolol  5 mg Intravenous Q6H    nozaseptin   Each Nare BID     PRN Meds:dextrose 50%, diphenhydrAMINE, glucagon (human recombinant), hydrALAZINE, insulin aspart, morphine, ondansetron, promethazine     Review of Systems   Constitutional: Positive for activity change and fatigue. Negative for appetite change, chills, diaphoresis, fever and unexpected weight change.   HENT: Negative for congestion, dental problem, drooling, ear discharge, ear pain, facial swelling, hearing loss, mouth sores, nosebleeds, postnasal drip, rhinorrhea, sinus pressure, sneezing, sore throat, tinnitus, trouble swallowing and voice change.    Eyes: Negative for photophobia, pain, discharge, redness, itching and visual disturbance.   Respiratory: Negative for apnea, cough, choking, chest tightness, shortness of breath, wheezing and stridor.    Cardiovascular: Negative for chest pain, palpitations and leg swelling.   Gastrointestinal: Negative for abdominal distention, abdominal pain, anal bleeding, blood in stool, constipation, diarrhea, nausea, rectal pain and vomiting.   Endocrine: Negative for cold intolerance, heat intolerance, polydipsia, polyphagia and polyuria.   Genitourinary: Negative for decreased urine volume, difficulty urinating, discharge, dysuria, enuresis, flank pain, frequency, genital sores, hematuria, penile pain, penile swelling, scrotal swelling, testicular pain and urgency.   Musculoskeletal: Negative for arthralgias, back pain, gait problem, joint swelling, myalgias, neck pain and neck stiffness.   Skin: Negative for color change, pallor, rash and wound.   Allergic/Immunologic: Negative for environmental allergies, food allergies and immunocompromised state.   Neurological: Positive for weakness. Negative for dizziness, tremors, seizures, syncope,  facial asymmetry, speech difficulty, light-headedness, numbness and headaches.   Hematological: Negative for adenopathy. Does not bruise/bleed easily.   Psychiatric/Behavioral: Positive for dysphoric mood. Negative for agitation, behavioral problems, confusion, decreased concentration, hallucinations, self-injury, sleep disturbance and suicidal ideas. The patient is nervous/anxious. The patient is not hyperactive.      Objective:     Vital Signs (Most Recent):  Temp: 98.3 °F (36.8 °C) (12/24/17 0723)  Pulse: 70 (12/24/17 0723)  Resp: 14 (12/24/17 0723)  BP: 137/64 (12/24/17 0723)  SpO2: (!) 94 % (12/24/17 0723) Vital Signs (24h Range):  Temp:  [97.7 °F (36.5 °C)-98.7 °F (37.1 °C)] 98.3 °F (36.8 °C)  Pulse:  [67-79] 70  Resp:  [12-18] 14  SpO2:  [94 %-97 %] 94 %  BP: (137-163)/(64-77) 137/64     Weight: 76.5 kg (168 lb 10.4 oz)  Body mass index is 28.07 kg/m².  Body surface area is 1.87 meters squared.      Intake/Output Summary (Last 24 hours) at 12/24/17 0927  Last data filed at 12/24/17 0558   Gross per 24 hour   Intake          1969.17 ml   Output              300 ml   Net          1669.17 ml       Physical Exam   Constitutional: He is oriented to person, place, and time. He has a sickly appearance. He appears ill. He appears distressed.   HENT:   Head: Normocephalic.   Right Ear: External ear normal.   Left Ear: External ear normal.   Nose: Nose normal. Right sinus exhibits no maxillary sinus tenderness and no frontal sinus tenderness. Left sinus exhibits no maxillary sinus tenderness and no frontal sinus tenderness.   Mouth/Throat: Oropharynx is clear and moist. No oropharyngeal exudate.   Eyes: EOM and lids are normal. Pupils are equal, round, and reactive to light. Right eye exhibits no discharge. Left eye exhibits no discharge. Right conjunctiva is not injected. Right conjunctiva has no hemorrhage. Left conjunctiva is not injected. Left conjunctiva has no hemorrhage. No scleral icterus. Right eye exhibits  normal extraocular motion. Left eye exhibits normal extraocular motion.   Neck: Normal range of motion. Neck supple. No JVD present. No tracheal deviation present. No thyromegaly present.   Cardiovascular: Normal rate and regular rhythm.    Pulmonary/Chest: Effort normal. No stridor. No respiratory distress.   Abdominal: Soft. He exhibits no mass. There is no hepatosplenomegaly, splenomegaly or hepatomegaly. There is no tenderness.   Musculoskeletal: Normal range of motion. He exhibits no edema or tenderness.   Lymphadenopathy:        Head (right side): No posterior auricular and no occipital adenopathy present.        Head (left side): No posterior auricular and no occipital adenopathy present.     He has no cervical adenopathy.        Right cervical: No superficial cervical, no deep cervical and no posterior cervical adenopathy present.       Left cervical: No superficial cervical, no deep cervical and no posterior cervical adenopathy present.     He has no axillary adenopathy.        Right: No supraclavicular adenopathy present.        Left: No supraclavicular adenopathy present.   Neurological: He is alert and oriented to person, place, and time. He has normal strength. No cranial nerve deficit. Coordination normal.   Skin: Skin is dry. No rash noted. He is not diaphoretic. No cyanosis or erythema. Nails show no clubbing.   Psychiatric: His behavior is normal. Judgment and thought content normal. His mood appears anxious. Cognition and memory are normal. He exhibits a depressed mood.   Vitals reviewed.      Significant Labs:   BMP:   Recent Labs  Lab 12/23/17 0514         K 3.4*      CO2 22*   BUN 8   CREATININE 0.6   CALCIUM 8.1*   MG 1.6   , CBC:   Recent Labs  Lab 12/23/17 0514   WBC 10.70   HGB 11.1*   HCT 32.5*       and CMP:   Recent Labs  Lab 12/23/17 0514      K 3.4*      CO2 22*      BUN 8   CREATININE 0.6   CALCIUM 8.1*   ANIONGAP 9   EGFRNONAA >60        Diagnostic Results:  I have reviewed and interpreted all pertinent imaging results/findings within the past 24 hours.    Assessment/Plan:     Malignant neoplasm of descending colon    77-year-old gentleman who presented with bowel obstruction secondary to distal descending colon mass with liver lesions consistent with metastatic colorectal cancer.  The patient is status post resection and liver biopsy with final pathology still pending. The patient and family verbalized their wishes to be evaluated at [facility] after discharge.  I assured them that medical records and pathology reports will be available for [facility] evaluation ASAP.     --CEA pending  --Follow-up on final pathology  --staging PET to be performed at [facility] ; 12/23/1735 minute face-to-face conversation with patient's daughter and patient reviewed diagnosis of metastatic colon carcinoma stage IV treatable but not curable malignancy articles from up-to-date sent to her for review copy of her path report given to also establish a for my [facility] for a wish to go to [facility] will be happy to cooperate with any consultation and if any type of treatment is given in the [facility] will happy to facilitate.  Answered questions face-to-face time 35 minutes; 12/24/17 an extensive discussion with the patient the patient's wife and the patient himself in terms of the nature of metastatic colon carcinoma the fact that is a treatable but not curable malignancy reviewed that I followed them information from up-to-date in the nature of his illness they have contacted pathology to follow it slides to [facility] the review I've offered them my services here in the [facility] should they have any of their care here to facilitate care.  My recommendations once KEREN testing has been performed on his primary tumor would be direct treatment with the appropriate agents.  Unless we have an active research trial that is open            Thank you for your consult. I  will follow-up with patient. Please contact us if you have any additional questions.     Boogie Hensley MD  Hematology/Oncology  [facility]

## 2017-12-24 NOTE — PROGRESS NOTES
Discharge instructions given, verbalized understanding. IJ and sutures removed. Off floor via wheelchair.

## 2017-12-24 NOTE — ASSESSMENT & PLAN NOTE
12/19 - Awaiting pathology. Discussed with Family. Plan for Heme Onc  12/20 - Path pending  12/21 - path pending. Heme/Onc following. Pt accepted at [facility] post discharge  12/22   --Pathology shows: Metastatic adenocarcinoma consistent with colon primary, Moderately-differentiated adenocarcinoma, and 3 of 3 lymph nodes positive for metastatic carcinoma.  12/24/17: Staging PET to be done at [facility]

## 2017-12-24 NOTE — PT/OT/SLP PROGRESS
Physical Therapy      Patient Name:  Herb Sumner   MRN:  [id number]    S: Patient waiting to go home.  O: Patient able to perform all mobility sup; amb in mares >300ft sup no lob  A: Patient is safe to zoie ome from PT standpoint.   P: Move to people movers if he stays.      Hema Gooden, PT

## 2017-12-24 NOTE — SUBJECTIVE & OBJECTIVE
Interval History: Extensive conversation this morning with patient daughter and patient's wife again explained the nature of metastatic colon carcinoma that is a treatable but not curable malignancy followed it information yesterday to the daughter on the nature of his diagnosis and information regarding this.  In addition they spoke with urology yesterday to have his slides followed it to [facility] for review.  There family is from the [facility] but 2 of the children live outside.  There is no supporting system here in the [place] area of told the family they certainly can receive outpatient chemotherapy he would probably best be done in an area especially immediately postop with there would be family members to support his strengthening from his extensive surgery and his adjustment to her colostomy    Oncology Treatment Plan:   [No treatment plan]    Medications:  Continuous Infusions:   sodium chloride 0.9% 50 mL/hr at 12/24/17 0858     Scheduled Meds:   calcium gluconate IVPB  1,000 mg Intravenous Once    enoxparin  40 mg Subcutaneous Q24H    famotidine  20 mg Intravenous BID    metoprolol  5 mg Intravenous Q6H    nozaseptin   Each Nare BID     PRN Meds:dextrose 50%, diphenhydrAMINE, glucagon (human recombinant), hydrALAZINE, insulin aspart, morphine, ondansetron, promethazine     Review of Systems   Constitutional: Positive for activity change and fatigue. Negative for appetite change, chills, diaphoresis, fever and unexpected weight change.   HENT: Negative for congestion, dental problem, drooling, ear discharge, ear pain, facial swelling, hearing loss, mouth sores, nosebleeds, postnasal drip, rhinorrhea, sinus pressure, sneezing, sore throat, tinnitus, trouble swallowing and voice change.    Eyes: Negative for photophobia, pain, discharge, redness, itching and visual disturbance.   Respiratory: Negative for apnea, cough, choking, chest tightness, shortness of breath, wheezing and stridor.     Cardiovascular: Negative for chest pain, palpitations and leg swelling.   Gastrointestinal: Negative for abdominal distention, abdominal pain, anal bleeding, blood in stool, constipation, diarrhea, nausea, rectal pain and vomiting.   Endocrine: Negative for cold intolerance, heat intolerance, polydipsia, polyphagia and polyuria.   Genitourinary: Negative for decreased urine volume, difficulty urinating, discharge, dysuria, enuresis, flank pain, frequency, genital sores, hematuria, penile pain, penile swelling, scrotal swelling, testicular pain and urgency.   Musculoskeletal: Negative for arthralgias, back pain, gait problem, joint swelling, myalgias, neck pain and neck stiffness.   Skin: Negative for color change, pallor, rash and wound.   Allergic/Immunologic: Negative for environmental allergies, food allergies and immunocompromised state.   Neurological: Positive for weakness. Negative for dizziness, tremors, seizures, syncope, facial asymmetry, speech difficulty, light-headedness, numbness and headaches.   Hematological: Negative for adenopathy. Does not bruise/bleed easily.   Psychiatric/Behavioral: Positive for dysphoric mood. Negative for agitation, behavioral problems, confusion, decreased concentration, hallucinations, self-injury, sleep disturbance and suicidal ideas. The patient is nervous/anxious. The patient is not hyperactive.      Objective:     Vital Signs (Most Recent):  Temp: 98.3 °F (36.8 °C) (12/24/17 0723)  Pulse: 70 (12/24/17 0723)  Resp: 14 (12/24/17 0723)  BP: 137/64 (12/24/17 0723)  SpO2: (!) 94 % (12/24/17 0723) Vital Signs (24h Range):  Temp:  [97.7 °F (36.5 °C)-98.7 °F (37.1 °C)] 98.3 °F (36.8 °C)  Pulse:  [67-79] 70  Resp:  [12-18] 14  SpO2:  [94 %-97 %] 94 %  BP: (137-163)/(64-77) 137/64     Weight: 76.5 kg (168 lb 10.4 oz)  Body mass index is 28.07 kg/m².  Body surface area is 1.87 meters squared.      Intake/Output Summary (Last 24 hours) at 12/24/17 0927  Last data filed at  12/24/17 0558   Gross per 24 hour   Intake          1969.17 ml   Output              300 ml   Net          1669.17 ml       Physical Exam   Constitutional: He is oriented to person, place, and time. He has a sickly appearance. He appears ill. He appears distressed.   HENT:   Head: Normocephalic.   Right Ear: External ear normal.   Left Ear: External ear normal.   Nose: Nose normal. Right sinus exhibits no maxillary sinus tenderness and no frontal sinus tenderness. Left sinus exhibits no maxillary sinus tenderness and no frontal sinus tenderness.   Mouth/Throat: Oropharynx is clear and moist. No oropharyngeal exudate.   Eyes: EOM and lids are normal. Pupils are equal, round, and reactive to light. Right eye exhibits no discharge. Left eye exhibits no discharge. Right conjunctiva is not injected. Right conjunctiva has no hemorrhage. Left conjunctiva is not injected. Left conjunctiva has no hemorrhage. No scleral icterus. Right eye exhibits normal extraocular motion. Left eye exhibits normal extraocular motion.   Neck: Normal range of motion. Neck supple. No JVD present. No tracheal deviation present. No thyromegaly present.   Cardiovascular: Normal rate and regular rhythm.    Pulmonary/Chest: Effort normal. No stridor. No respiratory distress.   Abdominal: Soft. He exhibits no mass. There is no hepatosplenomegaly, splenomegaly or hepatomegaly. There is no tenderness.   Musculoskeletal: Normal range of motion. He exhibits no edema or tenderness.   Lymphadenopathy:        Head (right side): No posterior auricular and no occipital adenopathy present.        Head (left side): No posterior auricular and no occipital adenopathy present.     He has no cervical adenopathy.        Right cervical: No superficial cervical, no deep cervical and no posterior cervical adenopathy present.       Left cervical: No superficial cervical, no deep cervical and no posterior cervical adenopathy present.     He has no axillary adenopathy.         Right: No supraclavicular adenopathy present.        Left: No supraclavicular adenopathy present.   Neurological: He is alert and oriented to person, place, and time. He has normal strength. No cranial nerve deficit. Coordination normal.   Skin: Skin is dry. No rash noted. He is not diaphoretic. No cyanosis or erythema. Nails show no clubbing.   Psychiatric: His behavior is normal. Judgment and thought content normal. His mood appears anxious. Cognition and memory are normal. He exhibits a depressed mood.   Vitals reviewed.      Significant Labs:   BMP:   Recent Labs  Lab 12/23/17 0514         K 3.4*      CO2 22*   BUN 8   CREATININE 0.6   CALCIUM 8.1*   MG 1.6   , CBC:   Recent Labs  Lab 12/23/17 0514   WBC 10.70   HGB 11.1*   HCT 32.5*       and CMP:   Recent Labs  Lab 12/23/17 0514      K 3.4*      CO2 22*      BUN 8   CREATININE 0.6   CALCIUM 8.1*   ANIONGAP 9   EGFRNONAA >60       Diagnostic Results:  I have reviewed and interpreted all pertinent imaging results/findings within the past 24 hours.

## 2017-12-24 NOTE — ASSESSMENT & PLAN NOTE
-Possibly represent metastasis associated with obstructing mass.   -Further work up will be planned as outpatient, if needed.     12/19 - Bx pending  12/20 - Bx pending  12/21 - Bx pending  12/22 --Pathology shows: Metastatic adenocarcinoma consistent with colon primary, Moderately-differentiated adenocarcinoma, and 3 of 3 lymph nodes positive for metastatic carcinoma.    12/23/17: will continue care at [facility] upon discharge  12/24/17: management and care at [facility]

## 2017-12-24 NOTE — PLAN OF CARE
Problem: Patient Care Overview  Goal: Plan of Care Review  Outcome: Ongoing (interventions implemented as appropriate)  Fall prevention precautions maintained, pt remained free of falls throughout shift, call bell and personal items within reach, pt's pain adequately controlled by prn pain medication, pt passing flatus and stool to colostomy. 24 hour chart check completed. Will continue to monitor

## 2017-12-24 NOTE — PLAN OF CARE
Problem: Patient Care Overview  Goal: Plan of Care Review  Outcome: Outcome(s) achieved Date Met: 12/24/17  To be discharged. Tolerating diet. Denies pain. Colostomy producing stool, family and patient participation encouraged. Chart check complete.

## 2018-01-02 ENCOUNTER — OFFICE VISIT (OUTPATIENT)
Dept: SURGERY | Facility: CLINIC | Age: 78
End: 2018-01-02

## 2018-01-02 VITALS
BODY MASS INDEX: 24.73 KG/M2 | TEMPERATURE: 98 F | SYSTOLIC BLOOD PRESSURE: 126 MMHG | DIASTOLIC BLOOD PRESSURE: 73 MMHG | WEIGHT: 148.56 LBS | HEART RATE: 92 BPM

## 2018-01-02 DIAGNOSIS — C18.6 MALIGNANT NEOPLASM OF DESCENDING COLON: Primary | ICD-10-CM

## 2018-01-02 PROCEDURE — 99024 POSTOP FOLLOW-UP VISIT: CPT | Mod: ,,, | Performed by: SURGERY

## 2018-01-02 PROCEDURE — 99999 PR PBB SHADOW E&M-EST. PATIENT-LVL III: CPT | Mod: PBBFAC,,, | Performed by: SURGERY

## 2018-01-02 PROCEDURE — 99213 OFFICE O/P EST LOW 20 MIN: CPT | Mod: PBBFAC,PO | Performed by: SURGERY

## 2018-01-02 NOTE — PROGRESS NOTES
Herb Sumner 77 y.o.male  This patient was seen for postoperative visit. Herb Sumner has no specific complaints and denies of any issues relevant to the surgery. The wound has healed without any complications.  I have discussed the pathology result with the patient. Herb Sumner will follow up as needed.     David Ballard

## 2018-01-04 NOTE — ANESTHESIA POSTPROCEDURE EVALUATION
"Anesthesia Post Evaluation    Patient: Herb Sumner    Procedure(s) Performed: Procedure(s) (LRB):  LAPAROTOMY-EXPLORATORY (N/A)  COLOSTOMY (Left)  BIOPSY-LIVER (N/A)  RESECTION-COLON    Final Anesthesia Type: general  Patient location during evaluation: PACU  Patient participation: Yes- Able to Participate  Level of consciousness: awake and alert  Post-procedure vital signs: reviewed and stable  Pain management: adequate  Airway patency: patent  PONV status at discharge: No PONV  Anesthetic complications: no      Cardiovascular status: blood pressure returned to baseline  Respiratory status: unassisted  Hydration status: euvolemic  Follow-up not needed.        Visit Vitals  /64 (BP Location: Right arm, Patient Position: Lying)   Pulse 70   Temp 36.8 °C (98.3 °F) (Oral)   Resp 14   Ht 5' 5" (1.651 m)   Wt 76.5 kg (168 lb 10.4 oz)   SpO2 (!) 94%   BMI 28.07 kg/m²       Pain/Kate Score: No Data Recorded      "

## 2018-01-09 ENCOUNTER — TELEPHONE (OUTPATIENT)
Dept: SURGERY | Facility: CLINIC | Age: 78
End: 2018-01-09

## 2018-01-09 DIAGNOSIS — C18.6 MALIGNANT NEOPLASM OF DESCENDING COLON: Primary | ICD-10-CM

## 2018-01-09 NOTE — TELEPHONE ENCOUNTER
Patient is having problems with colostomy, patient wants to go to [facility] from wound care. [facility] needs a referral before they can treat the patient.  Please put a referral in through Psychiatric for patient        Shawanda @ [facility] wound [facility]  [phone]

## 2018-05-31 ENCOUNTER — TELEPHONE (OUTPATIENT)
Dept: SURGERY | Facility: CLINIC | Age: 78
End: 2018-05-31

## 2018-05-31 NOTE — TELEPHONE ENCOUNTER
Returned call in regards to scheduling an appt to discuss Colostomy Reversal, during conversation matt was scheduled for 6/5/18 @ 2:40PM with Dr. Ballard/CODY. Patient verbalized an understanding.

## 2018-05-31 NOTE — TELEPHONE ENCOUNTER
----- Message from Juliette Fisher MA sent at 5/30/2018  1:50 PM CDT -----      ----- Message -----  From: Kindra Cho  Sent: 5/30/2018  10:35 AM  To: Nellie Hernandez Staff    states that dr kim bazin aw/ [facility] should've faxed over info rg reconnecting colostomy bags. has it been received?...[phone] (home)

## 2018-06-05 ENCOUNTER — OFFICE VISIT (OUTPATIENT)
Dept: SURGERY | Facility: CLINIC | Age: 78
End: 2018-06-05

## 2018-06-05 VITALS — WEIGHT: 151 LBS | BODY MASS INDEX: 25.16 KG/M2 | HEIGHT: 65 IN

## 2018-06-05 DIAGNOSIS — Z93.3 STATUS POST COLOSTOMY: Primary | ICD-10-CM

## 2018-06-05 PROCEDURE — 99212 OFFICE O/P EST SF 10 MIN: CPT | Mod: PBBFAC,PO | Performed by: SURGERY

## 2018-06-05 PROCEDURE — 99999 PR PBB SHADOW E&M-EST. PATIENT-LVL II: CPT | Mod: PBBFAC,,, | Performed by: SURGERY

## 2018-06-05 PROCEDURE — 99214 OFFICE O/P EST MOD 30 MIN: CPT | Mod: S$PBB,,, | Performed by: SURGERY

## 2018-06-05 RX ORDER — TRAMADOL HYDROCHLORIDE 50 MG/1
50 TABLET ORAL
COMMUNITY
Start: 2018-02-09

## 2018-06-05 RX ORDER — LIDOCAINE AND PRILOCAINE 25; 25 MG/G; MG/G
CREAM TOPICAL
COMMUNITY
Start: 2018-02-09

## 2018-06-05 RX ORDER — ONDANSETRON HYDROCHLORIDE 8 MG/1
8 TABLET, FILM COATED ORAL
COMMUNITY
Start: 2018-01-29

## 2018-06-05 RX ORDER — PROCHLORPERAZINE MALEATE 10 MG
10 TABLET ORAL
COMMUNITY
Start: 2018-01-29

## 2018-06-05 RX ORDER — UBIDECARENONE 30 MG
30 CAPSULE ORAL 3 TIMES DAILY
COMMUNITY

## 2018-06-05 NOTE — PROGRESS NOTES
Mrs. Sumner is 70-year-old [place] male patient who is well-known to me from previous encounters.  He was initially seen for colon obstruction due to colon cancer on December 18 of 2017.  Patient did undergo colon resection and was found to have a metastatic colon cancer to liver.  He had a multiple metastatic lesions involving both lobes of the liver.  He ended up with a Puneet procedure with the colon resection.  She recovered from that initial operation without any complications.  He was discharged and was seen at the [facility] in [place], [place].  [facility] for ongoing chemotherapy.  He is currently on FOLFOX regiment per [facility] oncology service.  He was informed that he could be a candidate to have his colostomy reversed.  I have reviewed the CT scan of the abdomen and pelvis that was done last week.  The CT scan confirms the presence of persistent liver masses, that have a shrunken in size compared to the initial one after the chemotherapy but persistent.  Although I understand the patient has wished to reverse the colostomy and patient is doing quite well overall, I will not take this reversal of colostomy procedure lightly.  The patient is currently on chemotherapeutic regimens and thus immunocompromised.  The colostomy reversal procedure can be risky.  However, if his prognosis is excellent, I would not hesitate to reverse the colostomy.  The patient denies of any complications or difficulties with colostomy.  This was discussed with the patient.  I have informed the patient that I will discuss his case at the next a tumor conference.  The patient will be informed result of our discussion.  The patient agrees to wait.  Total time approximately half an hour was spent for this patient, and more than 50% of that was spent for treatment planning and counseling.      David Ballard

## 2018-06-15 ENCOUNTER — TELEPHONE (OUTPATIENT)
Dept: SURGERY | Facility: CLINIC | Age: 78
End: 2018-06-15

## 2018-06-15 NOTE — TELEPHONE ENCOUNTER
----- Message from Tram Monteiro sent at 6/15/2018  2:59 PM CDT -----  Contact: pt   States he's calling rg a phone call that he were to receive and is following up on that and can be reached at [phone]//[email or web address]

## 2021-01-25 NOTE — PROCEDURES
"Herb Sumner is a 77 y.o. male patient.    Temp: 99.2 °F (37.3 °C) (12/19/17 0545)  Pulse: 104 (12/19/17 0840)  Resp: (!) 24 (12/19/17 0840)  BP: 93/73 (12/19/17 0630)  SpO2: (!) 93 % (12/19/17 0840)  Weight: 74.4 kg (164 lb 0.4 oz) (12/18/17 0844)  Height: 5' 5" (165.1 cm) (12/18/17 0844)       Central Line  Date/Time: 12/19/2017 6:50 AM  Location procedure was performed: [facility] INTENSIVE CARE UNIT  Performed by: BRADY ARZATE  Pre-operative Diagnosis: shock  Post-operative diagnosis: shock  Consent Done: Yes  Indications: vascular access and med administration  Anesthesia: local infiltration    Anesthesia:  Local Anesthetic: lidocaine 1% without epinephrine  Anesthetic total: 2 mL  Preparation: skin prepped with ChloraPrep  Skin prep agent dried: skin prep agent completely dried prior to procedure  Sterile barriers: all five maximum sterile barriers used - cap, mask, sterile gown, sterile gloves, and large sterile sheet  Hand hygiene: hand hygiene performed prior to central venous catheter insertion  Location details: left internal jugular  Catheter type: triple lumen  Catheter size: 7 Fr  Catheter Length: 16cm    Ultrasound guidance: yes  Vessel Caliber: large, patent, compressibility normal  Vascular Doppler: not done  Needle advanced into vessel with real time Ultrasound guidance.  Guidewire confirmed in vessel.  Sterile sheath used.  Manometry: No   Number of attempts: 1  Assessment: placement verified by x-ray and successful placement  Complications: none  Estimated blood loss (mL): 2  Specimens: No  Implants: No  Post-procedure: line sutured,  chlorhexidine patch,  sterile dressing applied and blood return through all ports  Complications: No          Brady Arzate  12/19/2017  " Cimzia Counseling:  I discussed with the patient the risks of Cimzia including but not limited to immunosuppression, allergic reactions and infections.  The patient understands that monitoring is required including a PPD at baseline and must alert us or the primary physician if symptoms of infection or other concerning signs are noted.

## 2021-04-29 ENCOUNTER — PATIENT MESSAGE (OUTPATIENT)
Dept: RESEARCH | Facility: HOSPITAL | Age: 81
End: 2021-04-29

## 2021-06-04 NOTE — ED NOTES
Assessment and Plan:    Problem List Items Addressed This Visit     Acute CVA (cerebrovascular accident) [facility])     Patient this point seems to be doing well  No changes are needed at the moment  I would recommend follow-up with Neurology in the future as scheduled  Anticoagulated on Coumadin     Patient continues on Coumadin  INR was slightly variable lately  He is scheduled for repeat next week  Atrial fibrillation [facility])     Patient is doing relatively well with the atrial fibrillation  Follow-up with Cardiology  Currently has loop recorder, and is due for interrogation and evaluation with Cardiology  Remains on Coumadin secondary to failure with Eliquis and Xarelto  GERD (gastroesophageal reflux disease)     Patient's reflux is worse than what it was  This may be part of the cause of coughing up blood  Agree with GI evaluation  Patient did mention that he does have some times where he has black stools  They are not tarry, however  I would recommend that we add Pepcid to his current regimen, famotidine generic  At this point, would be 40 mg once a day  Continue on pantoprazole as well  Relevant Medications    famotidine (PEPCID) 40 MG tablet    Hypertension     Blood pressure today is reasonable  No changes  Spitting up blood - Primary     Patient did have episode of spitting up blood in the hospital, however that seems to be resolved at this point  Question if this is related to reflux, or postnasal drip and blood from the nose, but in either case I do agree with gastroenterology  Need to continue to monitor the INR  Other Visit Diagnoses     Stroke [facility])        Relevant Medications    warfarin (COUMADIN) 5 mg tablet                 Diagnoses and all orders for this visit:    Spitting up blood    Gastroesophageal reflux disease without esophagitis  -     famotidine (PEPCID) 40 MG tablet;  Take 1 tablet (40 mg total) by mouth daily    Paroxysmal Patient placed on continuous cardiac monitor, automatic blood pressure cuff and continuous pulse oximeter.   atrial fibrillation (HCC)    Essential hypertension    Acute CVA (cerebrovascular accident) ([facility] [address] )    Anticoagulated on Coumadin    [facility]  -     warfarin (COUMADIN) 5 mg tablet; Take 1 tablet (5 mg total) by mouth daily              Subjective:      Patient ID: Rohan Ortiz is a 64 y o  male  CC:    Chief Complaint   Patient presents with    Follow-up     Follow up and discuss Chronic conditions and discuss recent hospital stay for spitting up blood  kw       HPI:    Was recently in the emergency room for 10 hours  Patient was having coughing of blood  Based on that, he did go to the emergency room  Patient's INR when he was in the hospital was normal   CT scan was performed in the hospital   GI was recommended on discharge  Patient is doing well today  INR has been a bit low  Reviewed about the INR  A-fib: Doing well  Has eval with Cardio in near future  Hypertension:  Currently on amlodipine, metoprolol  CVA: Failed Xarelto/Eliquis  On Coumadin  Patient continues to have some significant neuropathy symptoms in both hands and feet  This is not any different lately  Patient has been noting increasing reflux symptoms, more specifically he needs to elevate his head or he feels bile  It seems to last for a while  Eventuality it subsides if he does keep his head elevated  The following portions of the patient's history were reviewed and updated as appropriate: allergies, current medications, past family history, past medical history, past social history, past surgical history and problem list       Review of Systems   Constitutional: Negative  HENT: Negative  Eyes: Negative  Respiratory: Negative  Negative for cough (Noted on admission to hospital, none currently)  Cardiovascular: Negative  Gastrointestinal:        Reflux symptoms   Endocrine: Negative  Genitourinary: Negative  Musculoskeletal: Negative  Skin: Negative  Allergic/Immunologic: Negative  Neurological: Negative  Hematological: Negative  Psychiatric/Behavioral: Negative  Data to review:   CT scan no acute intra-abdominal abnormality, no free air or free fluid, no infiltrate or pleural effusion, no acute intrathoracic abnormality  Hemoglobin and hematocrit 14 7, 43 2   21 May  Objective:    Vitals:    06/04/21 0807   BP: 130/76   BP Location: Left arm   Patient Position: Sitting   Pulse: 56   Weight: 116 kg (255 lb)   Height: 5' 8" (1 727 m)        Physical Exam  Vitals signs and nursing note reviewed  Constitutional:       Appearance: Normal appearance  Neck:      Vascular: No carotid bruit  Cardiovascular:      Rate and Rhythm: Normal rate and regular rhythm  Pulses: Normal pulses  Carotid pulses are 2+ on the right side and 2+ on the left side  Heart sounds: Normal heart sounds  No murmur  No gallop  Pulmonary:      Effort: Pulmonary effort is normal  No respiratory distress  Breath sounds: Normal breath sounds  No stridor  No wheezing, rhonchi or rales  Chest:      Chest wall: No tenderness  Neurological:      Mental Status: He is alert

## (undated) DEVICE — GAUZE SPONGE 4X4 12PLY

## (undated) DEVICE — SUT VICRYL PLUS 3-0 SH 18IN

## (undated) DEVICE — TAPE SURG MEDIPORE 6X72IN

## (undated) DEVICE — SUT SILK 2-0 STRANDS 30IN

## (undated) DEVICE — ELECTRODE REM PLYHSV RETURN 9

## (undated) DEVICE — SUT 2/0 30IN SILK BLK BRAI

## (undated) DEVICE — CUTTER PROXIMATE GREEN 75MM

## (undated) DEVICE — SHEARS HARMONIC 5CM 36CM

## (undated) DEVICE — SEE MEDLINE ITEM 152622

## (undated) DEVICE — GLOVE PROTEXIS HYDROGEL SZ7

## (undated) DEVICE — SEE MEDLINE ITEM 146345

## (undated) DEVICE — SEE L#153236

## (undated) DEVICE — SUT 1 36IN PDS II VIO MONO

## (undated) DEVICE — SEE MEDLINE ITEM 157027

## (undated) DEVICE — RELOAD PROXIMATE GREEN 75MM

## (undated) DEVICE — HEMOSTAT SURGICEL 2X3IN

## (undated) DEVICE — SUT SILK 0 SUTUPAK SA86H

## (undated) DEVICE — SUT SILK 3-0 SH 18IN BLACK

## (undated) DEVICE — Device

## (undated) DEVICE — NDL HYPO SAFETY 22 X 1 1/2

## (undated) DEVICE — SPONGE LAP 18X18 PREWASHED

## (undated) DEVICE — SUT VICRYL 3-0 27 SH

## (undated) DEVICE — SEE MEDLINE ITEM 157117

## (undated) DEVICE — SUT PDS II 96 1 VIO

## (undated) DEVICE — MANIFOLD 4 PORT

## (undated) DEVICE — POSITIONER HEAD DONUT 9IN FOAM

## (undated) DEVICE — ELECTRODE BLD EXT 6.50 ST DISP

## (undated) DEVICE — PAD ABD 8X10 STERILE

## (undated) DEVICE — SUPPORT ULNA NERVE PROTECTOR

## (undated) DEVICE — COVER OVERHEAD SURG LT BLUE

## (undated) DEVICE — GLOVE 7.0 PROTEXIS PI BLUE

## (undated) DEVICE — SEE MEDLINE ITEM 157148

## (undated) DEVICE — WARMER DRAPE STERILE LF

## (undated) DEVICE — APPLICATOR CHLORAPREP ORN 26ML

## (undated) DEVICE — SYR 30CC LUER LOCK

## (undated) DEVICE — SET DECANTER MEDICHOICE

## (undated) DEVICE — SUT 1 48IN PDS II VIO MONO

## (undated) DEVICE — POUCH OUTLETSENSURA MIO WIDE

## (undated) DEVICE — DECANTER VIAL ASEPTIC TRANSFER

## (undated) DEVICE — SOL 9P NACL IRR PIC IL

## (undated) DEVICE — SUT SILK 3-0 STRANDS 30IN